# Patient Record
Sex: MALE | Race: BLACK OR AFRICAN AMERICAN | Employment: OTHER | URBAN - METROPOLITAN AREA
[De-identification: names, ages, dates, MRNs, and addresses within clinical notes are randomized per-mention and may not be internally consistent; named-entity substitution may affect disease eponyms.]

---

## 2017-01-04 ENCOUNTER — OFFICE VISIT (OUTPATIENT)
Dept: SURGERY | Age: 72
End: 2017-01-04

## 2017-01-04 VITALS
WEIGHT: 200 LBS | HEIGHT: 67 IN | SYSTOLIC BLOOD PRESSURE: 130 MMHG | BODY MASS INDEX: 31.39 KG/M2 | DIASTOLIC BLOOD PRESSURE: 68 MMHG | HEART RATE: 80 BPM

## 2017-01-04 LAB
GLUCOSE BLD-MCNC: 131 MG/DL
HBA1C MFR BLD: 9.8 %

## 2017-01-04 PROCEDURE — 83036 HEMOGLOBIN GLYCOSYLATED A1C: CPT | Performed by: INTERNAL MEDICINE

## 2017-01-04 PROCEDURE — 99203 OFFICE O/P NEW LOW 30 MIN: CPT | Performed by: INTERNAL MEDICINE

## 2017-01-04 PROCEDURE — 82962 GLUCOSE BLOOD TEST: CPT | Performed by: INTERNAL MEDICINE

## 2017-01-08 ASSESSMENT — ENCOUNTER SYMPTOMS
VISUAL CHANGE: 0
GASTROINTESTINAL NEGATIVE: 1
EYES NEGATIVE: 1
RESPIRATORY NEGATIVE: 1

## 2017-01-30 RX ORDER — ISOPROPYL ALCOHOL 0.75 G/1
SWAB TOPICAL
Refills: 0 | COMMUNITY
Start: 2017-01-13 | End: 2017-02-22

## 2017-01-30 RX ORDER — AMLODIPINE BESYLATE AND BENAZEPRIL HYDROCHLORIDE 5; 20 MG/1; MG/1
CAPSULE ORAL
Qty: 30 CAPSULE | Refills: 5 | Status: SHIPPED | OUTPATIENT
Start: 2017-01-30 | End: 2017-10-05 | Stop reason: SDUPTHER

## 2017-02-22 RX ORDER — ISOPROPYL ALCOHOL 0.75 G/1
SWAB TOPICAL
Qty: 100 EACH | Refills: 1 | Status: SHIPPED | OUTPATIENT
Start: 2017-02-22 | End: 2017-07-20 | Stop reason: SDUPTHER

## 2017-03-16 ENCOUNTER — OFFICE VISIT (OUTPATIENT)
Dept: INTERNAL MEDICINE | Age: 72
End: 2017-03-16

## 2017-03-16 VITALS
BODY MASS INDEX: 31.39 KG/M2 | DIASTOLIC BLOOD PRESSURE: 70 MMHG | SYSTOLIC BLOOD PRESSURE: 132 MMHG | OXYGEN SATURATION: 94 % | WEIGHT: 200 LBS | HEIGHT: 67 IN | HEART RATE: 96 BPM

## 2017-03-16 DIAGNOSIS — I10 ESSENTIAL HYPERTENSION: Primary | Chronic | ICD-10-CM

## 2017-03-16 DIAGNOSIS — D64.9 NORMOCYTIC ANEMIA: ICD-10-CM

## 2017-03-16 LAB
ANION GAP SERPL CALCULATED.3IONS-SCNC: 13 MEQ/L (ref 7–13)
BUN BLDV-MCNC: 13 MG/DL (ref 8–23)
CALCIUM SERPL-MCNC: 10.5 MG/DL (ref 8.6–10.2)
CHLORIDE BLD-SCNC: 104 MEQ/L (ref 98–107)
CO2: 25 MEQ/L (ref 22–29)
CREAT SERPL-MCNC: 1.03 MG/DL (ref 0.7–1.2)
CREATININE URINE: 145.2 MG/DL
GFR AFRICAN AMERICAN: >60
GFR NON-AFRICAN AMERICAN: >60
GLUCOSE BLD-MCNC: 73 MG/DL (ref 74–109)
HBA1C MFR BLD: 8.3 % (ref 4.8–5.9)
MICROALBUMIN UR-MCNC: <1.2 MG/DL
MICROALBUMIN/CREAT UR-RTO: NORMAL MG/G (ref 0–30)
POTASSIUM SERPL-SCNC: 4.4 MEQ/L (ref 3.5–5.1)
SODIUM BLD-SCNC: 142 MEQ/L (ref 132–144)

## 2017-03-16 PROCEDURE — 99213 OFFICE O/P EST LOW 20 MIN: CPT | Performed by: INTERNAL MEDICINE

## 2017-03-16 ASSESSMENT — ENCOUNTER SYMPTOMS
BLOOD IN STOOL: 0
ABDOMINAL PAIN: 0
CHEST TIGHTNESS: 0
GASTROINTESTINAL NEGATIVE: 1
SHORTNESS OF BREATH: 0
RESPIRATORY NEGATIVE: 1
TROUBLE SWALLOWING: 0
ORTHOPNEA: 0

## 2017-03-27 RX ORDER — ASPIRIN 81 MG
TABLET, DELAYED RELEASE (ENTERIC COATED) ORAL
Qty: 30 TABLET | Refills: 5 | Status: SHIPPED | OUTPATIENT
Start: 2017-03-27 | End: 2017-09-22 | Stop reason: SDUPTHER

## 2017-03-29 RX ORDER — ATORVASTATIN CALCIUM 20 MG/1
TABLET, FILM COATED ORAL
Qty: 30 TABLET | Refills: 3 | Status: SHIPPED | OUTPATIENT
Start: 2017-03-29 | End: 2017-07-28 | Stop reason: SDUPTHER

## 2017-04-04 ENCOUNTER — OFFICE VISIT (OUTPATIENT)
Dept: SURGERY | Age: 72
End: 2017-04-04

## 2017-04-04 VITALS
HEART RATE: 73 BPM | SYSTOLIC BLOOD PRESSURE: 126 MMHG | DIASTOLIC BLOOD PRESSURE: 74 MMHG | BODY MASS INDEX: 31.39 KG/M2 | HEIGHT: 67 IN | WEIGHT: 200 LBS

## 2017-04-04 LAB — GLUCOSE BLD-MCNC: 75 MG/DL

## 2017-04-04 PROCEDURE — 82962 GLUCOSE BLOOD TEST: CPT | Performed by: INTERNAL MEDICINE

## 2017-04-04 PROCEDURE — 99213 OFFICE O/P EST LOW 20 MIN: CPT | Performed by: INTERNAL MEDICINE

## 2017-06-02 DIAGNOSIS — C61 ADENOCARCINOMA OF PROSTATE, STAGE 2 (HCC): ICD-10-CM

## 2017-06-02 LAB
ANION GAP SERPL CALCULATED.3IONS-SCNC: 16 MEQ/L (ref 7–13)
BUN BLDV-MCNC: 12 MG/DL (ref 8–23)
CALCIUM SERPL-MCNC: 9.3 MG/DL (ref 8.6–10.2)
CHLORIDE BLD-SCNC: 104 MEQ/L (ref 98–107)
CO2: 21 MEQ/L (ref 22–29)
CREAT SERPL-MCNC: 0.84 MG/DL (ref 0.7–1.2)
GFR AFRICAN AMERICAN: >60
GFR NON-AFRICAN AMERICAN: >60
GLUCOSE BLD-MCNC: 151 MG/DL (ref 74–109)
HBA1C MFR BLD: 7.9 % (ref 4.8–5.9)
POTASSIUM SERPL-SCNC: 3.9 MEQ/L (ref 3.5–5.1)
PROSTATE SPECIFIC ANTIGEN: 0.14 NG/ML (ref 0–6.22)
SODIUM BLD-SCNC: 141 MEQ/L (ref 132–144)

## 2017-06-21 RX ORDER — GLIMEPIRIDE 4 MG/1
TABLET ORAL
Qty: 60 TABLET | Refills: 5 | Status: SHIPPED | OUTPATIENT
Start: 2017-06-21 | End: 2017-12-18 | Stop reason: SDUPTHER

## 2017-06-22 RX ORDER — AVOBENZONE, HOMOSALATE, OCTISALATE, OCTOCRYLENE 30; 100; 50; 25 MG/ML; MG/ML; MG/ML; MG/ML
SPRAY TOPICAL
Qty: 30 TABLET | Refills: 5 | Status: SHIPPED | OUTPATIENT
Start: 2017-06-22 | End: 2017-12-15 | Stop reason: SDUPTHER

## 2017-07-13 ENCOUNTER — OFFICE VISIT (OUTPATIENT)
Dept: SURGERY | Age: 72
End: 2017-07-13

## 2017-07-13 ENCOUNTER — OFFICE VISIT (OUTPATIENT)
Dept: INTERNAL MEDICINE | Age: 72
End: 2017-07-13

## 2017-07-13 VITALS
BODY MASS INDEX: 31.23 KG/M2 | HEART RATE: 67 BPM | HEIGHT: 67 IN | SYSTOLIC BLOOD PRESSURE: 139 MMHG | WEIGHT: 199 LBS | DIASTOLIC BLOOD PRESSURE: 78 MMHG

## 2017-07-13 VITALS
BODY MASS INDEX: 31.23 KG/M2 | DIASTOLIC BLOOD PRESSURE: 78 MMHG | SYSTOLIC BLOOD PRESSURE: 142 MMHG | HEIGHT: 67 IN | WEIGHT: 199 LBS | OXYGEN SATURATION: 98 % | HEART RATE: 67 BPM | TEMPERATURE: 97.2 F

## 2017-07-13 DIAGNOSIS — E78.5 HYPERLIPIDEMIA WITH TARGET LDL LESS THAN 100: ICD-10-CM

## 2017-07-13 DIAGNOSIS — I10 ESSENTIAL HYPERTENSION, BENIGN: Primary | Chronic | ICD-10-CM

## 2017-07-13 LAB — GLUCOSE BLD-MCNC: 106 MG/DL

## 2017-07-13 PROCEDURE — 99213 OFFICE O/P EST LOW 20 MIN: CPT | Performed by: INTERNAL MEDICINE

## 2017-07-13 PROCEDURE — 82962 GLUCOSE BLOOD TEST: CPT | Performed by: INTERNAL MEDICINE

## 2017-07-13 ASSESSMENT — ENCOUNTER SYMPTOMS
BLOOD IN STOOL: 0
CHEST TIGHTNESS: 0
TROUBLE SWALLOWING: 0
ABDOMINAL PAIN: 0
GASTROINTESTINAL NEGATIVE: 1
SHORTNESS OF BREATH: 0
ORTHOPNEA: 0
RESPIRATORY NEGATIVE: 1

## 2017-07-20 RX ORDER — CLONIDINE HYDROCHLORIDE 0.1 MG/1
TABLET ORAL
Qty: 60 TABLET | Refills: 3 | Status: SHIPPED | OUTPATIENT
Start: 2017-07-20 | End: 2017-11-21 | Stop reason: SDUPTHER

## 2017-07-20 RX ORDER — PEN NEEDLE, DIABETIC 31 GX5/16"
NEEDLE, DISPOSABLE MISCELLANEOUS
Qty: 100 EACH | Refills: 3 | Status: SHIPPED | OUTPATIENT
Start: 2017-07-20 | End: 2017-10-05 | Stop reason: SDUPTHER

## 2017-07-20 RX ORDER — ISOPROPYL ALCOHOL 0.75 G/1
SWAB TOPICAL
Qty: 100 EACH | Refills: 1 | Status: SHIPPED | OUTPATIENT
Start: 2017-07-20 | End: 2018-04-15 | Stop reason: SDUPTHER

## 2017-07-24 RX ORDER — SITAGLIPTIN 100 MG/1
TABLET, FILM COATED ORAL
Qty: 30 TABLET | Refills: 5 | Status: SHIPPED | OUTPATIENT
Start: 2017-07-24 | End: 2017-10-05 | Stop reason: SDUPTHER

## 2017-07-31 RX ORDER — ATORVASTATIN CALCIUM 20 MG/1
TABLET, FILM COATED ORAL
Qty: 30 TABLET | Refills: 3 | Status: SHIPPED | OUTPATIENT
Start: 2017-07-31 | End: 2017-10-05 | Stop reason: SDUPTHER

## 2017-08-07 RX ORDER — LANCETS
EACH MISCELLANEOUS
Qty: 100 EACH | Refills: 3 | Status: SHIPPED | OUTPATIENT
Start: 2017-08-07 | End: 2021-08-26

## 2017-08-10 ENCOUNTER — OFFICE VISIT (OUTPATIENT)
Dept: UROLOGY | Age: 72
End: 2017-08-10

## 2017-08-10 VITALS
HEART RATE: 78 BPM | DIASTOLIC BLOOD PRESSURE: 76 MMHG | WEIGHT: 198.5 LBS | HEIGHT: 69 IN | BODY MASS INDEX: 29.4 KG/M2 | SYSTOLIC BLOOD PRESSURE: 140 MMHG

## 2017-08-10 DIAGNOSIS — C61 ADENOCARCINOMA OF PROSTATE, STAGE 2 (HCC): Primary | ICD-10-CM

## 2017-08-10 PROCEDURE — 99214 OFFICE O/P EST MOD 30 MIN: CPT | Performed by: UROLOGY

## 2017-08-10 RX ORDER — SILDENAFIL CITRATE 20 MG/1
TABLET ORAL
Qty: 30 TABLET | Refills: 11 | Status: SHIPPED | OUTPATIENT
Start: 2017-08-10 | End: 2019-02-12

## 2017-09-22 RX ORDER — ASPIRIN 81 MG
TABLET, DELAYED RELEASE (ENTERIC COATED) ORAL
Qty: 30 TABLET | Refills: 5 | Status: SHIPPED | OUTPATIENT
Start: 2017-09-22 | End: 2018-01-11 | Stop reason: SDUPTHER

## 2017-10-05 ENCOUNTER — OFFICE VISIT (OUTPATIENT)
Dept: INTERNAL MEDICINE | Age: 72
End: 2017-10-05

## 2017-10-05 ENCOUNTER — OFFICE VISIT (OUTPATIENT)
Dept: SURGERY | Age: 72
End: 2017-10-05

## 2017-10-05 VITALS
HEIGHT: 67 IN | BODY MASS INDEX: 31.39 KG/M2 | HEART RATE: 71 BPM | SYSTOLIC BLOOD PRESSURE: 128 MMHG | OXYGEN SATURATION: 98 % | TEMPERATURE: 97.8 F | WEIGHT: 200 LBS | DIASTOLIC BLOOD PRESSURE: 80 MMHG

## 2017-10-05 VITALS
SYSTOLIC BLOOD PRESSURE: 154 MMHG | DIASTOLIC BLOOD PRESSURE: 88 MMHG | HEIGHT: 67 IN | WEIGHT: 200 LBS | BODY MASS INDEX: 31.39 KG/M2 | HEART RATE: 70 BPM

## 2017-10-05 DIAGNOSIS — I10 ESSENTIAL HYPERTENSION: Primary | ICD-10-CM

## 2017-10-05 DIAGNOSIS — E78.5 HYPERLIPIDEMIA WITH TARGET LDL LESS THAN 100: ICD-10-CM

## 2017-10-05 DIAGNOSIS — I10 ESSENTIAL HYPERTENSION, BENIGN: Chronic | ICD-10-CM

## 2017-10-05 LAB
ANION GAP SERPL CALCULATED.3IONS-SCNC: 14 MEQ/L (ref 7–13)
BUN BLDV-MCNC: 9 MG/DL (ref 8–23)
CALCIUM SERPL-MCNC: 9.3 MG/DL (ref 8.6–10.2)
CHLORIDE BLD-SCNC: 104 MEQ/L (ref 98–107)
CHOLESTEROL, TOTAL: 151 MG/DL (ref 0–199)
CO2: 24 MEQ/L (ref 22–29)
CREAT SERPL-MCNC: 0.91 MG/DL (ref 0.7–1.2)
GFR AFRICAN AMERICAN: >60
GFR NON-AFRICAN AMERICAN: >60
GLUCOSE BLD-MCNC: 127 MG/DL (ref 74–109)
GLUCOSE BLD-MCNC: 132 MG/DL
HBA1C MFR BLD: 7.6 % (ref 4.8–5.9)
HDLC SERPL-MCNC: 29 MG/DL (ref 40–59)
LDL CHOLESTEROL CALCULATED: 104 MG/DL (ref 0–129)
POTASSIUM SERPL-SCNC: 4 MEQ/L (ref 3.5–5.1)
SODIUM BLD-SCNC: 142 MEQ/L (ref 132–144)
TRIGL SERPL-MCNC: 89 MG/DL (ref 0–200)

## 2017-10-05 PROCEDURE — 99213 OFFICE O/P EST LOW 20 MIN: CPT | Performed by: INTERNAL MEDICINE

## 2017-10-05 PROCEDURE — 82962 GLUCOSE BLOOD TEST: CPT | Performed by: INTERNAL MEDICINE

## 2017-10-05 RX ORDER — AMLODIPINE BESYLATE AND BENAZEPRIL HYDROCHLORIDE 5; 20 MG/1; MG/1
CAPSULE ORAL
Qty: 30 CAPSULE | Refills: 5 | Status: SHIPPED | OUTPATIENT
Start: 2017-10-05 | End: 2018-04-11 | Stop reason: SDUPTHER

## 2017-10-05 RX ORDER — ATORVASTATIN CALCIUM 20 MG/1
TABLET, FILM COATED ORAL
Qty: 30 TABLET | Refills: 5 | Status: SHIPPED | OUTPATIENT
Start: 2017-10-05 | End: 2018-04-11 | Stop reason: SDUPTHER

## 2017-10-05 ASSESSMENT — ENCOUNTER SYMPTOMS
TROUBLE SWALLOWING: 0
GASTROINTESTINAL NEGATIVE: 1
RESPIRATORY NEGATIVE: 1
BLOOD IN STOOL: 0
ABDOMINAL PAIN: 0
ORTHOPNEA: 0
SHORTNESS OF BREATH: 0
CHEST TIGHTNESS: 0

## 2017-10-05 ASSESSMENT — PATIENT HEALTH QUESTIONNAIRE - PHQ9
SUM OF ALL RESPONSES TO PHQ9 QUESTIONS 1 & 2: 0
2. FEELING DOWN, DEPRESSED OR HOPELESS: 0
SUM OF ALL RESPONSES TO PHQ QUESTIONS 1-9: 0
1. LITTLE INTEREST OR PLEASURE IN DOING THINGS: 0

## 2017-10-05 NOTE — PROGRESS NOTES
Compliance problems include adherence to diet and adherence to exercise. Risk factors for coronary artery disease include hypertension, male sex, obesity, diabetes mellitus and dyslipidemia. More detail above in the chief complaint(s) and below in the review of systems. Past Medical History:   Diagnosis Date    Adenocarcinoma of prostate, stage 2 ([facility] 2015    [place] 3+4, Dr Shawna Ayala  PSA 7.69 intermediate risk    BPH with elevated PSA 2014    Dr Tien Rainey Erectile dysfunction associated with type 2 diabetes mellitus ([facility]     Essential hypertension, benign     Low HDL (under 40)     Obesity (BMI 30-39. 9)     Pes planus of both feet 2015    Dr Sharla Collins S/P colonoscopy with polypectomy 2014    Dr Wesly Reyez Uncontrolled type 2 diabetes mellitus with microalbuminuria [facility]) 2007    Dr Barbara Oleary Unspecified deformity of ankle and foot, acquired     Vitamin B12 deficiency anemia 2016       Past Surgical History:   Procedure Laterality Date    COLONOSCOPY  03/20/2014    Polyp, Diverticulosis, Internal Hemorrhoids-Dr Sanders    PROSTATE BIOPSY  1/2015    Dr Shawna Ayala       Social History     Social History    Marital status: Single     Spouse name: N/A    Number of children: 2    Years of education: N/A     Occupational History    Fast food, others, SSI      Social History Main Topics    Smoking status: Former Smoker     Start date: 10/28/2004    Smokeless tobacco: Never Used    Alcohol use Yes      Comment: occasionally, beer- recovering alcoholic    Drug use: No    Sexual activity: Yes     Partners: Female     Other Topics Concern    Not on file     Social History Narrative    Born in [place], one of 6    Lives in a house in [place] with girlfriend    2 sons in [place]    Worked \"everywhere\", on disability         Hobbies: video games       Family History   Problem Relation Age of Onset    Stroke Mother      dec age [de-identified]    Cancer Brother      liver, dec age 79    Diabetes Father    

## 2017-10-05 NOTE — MR AVS SNAPSHOT
After Visit Summary             Efren Oseguera   10/5/2017 4:00 PM   Office Visit    Description:  Male : 1945   Provider:  Sergio Hess MD   Department:  [address] [facility] and Future Appointments         Below is a list of your follow-up and future appointments. This may not be a complete list as you may have made appointments directly with providers that we are not aware of or your providers may have made some for you. Please call your providers to confirm appointments. It is important to keep your appointments. Please bring your current insurance card, photo ID, co-pay, and all medication bottles to your appointment. If self-pay, payment is expected at the time of service. Your To-Do List     Future Appointments Provider Department Dept Phone    2018 3:15 PM Sergio Hess MD [facility] [phone]    Please arrive 15 minutes prior to appointment, bring photo ID and insurance card. 2018 4:00 PM Edil Dent MD [address] [facility] [phone]    Please arrive 15 minutes prior to appointment, bring photo ID and insurance card. Please arrive 15 minutes prior to your appointment. 2018 2:15 PM Lisa Ovalles MD [facility] EMERGENCY [facility] [phone]    Please arrive 15 minutes prior to appointment, bring photo ID and insurance card. Follow-Up    Return in about 5 months (around 3/5/2018).          Information from Your Visit        Department     Name Address Phone Fax    [address] [phone]      You Were Seen for:         Comments    Essential hypertension   [[id number]]         Vital Signs     Blood Pressure Pulse Temperature Height Weight Oxygen Saturation    128/80 71 97.8 °F (36.6 °C) (Tympanic) 5' 7\" (1.702 m) 200 lb (90.7 kg) 98%    Body Mass Index Smoking Status                31.32 kg/m2 Former Smoker sildenafil (REVATIO) 20 MG tablet Take one tablet to five tablets (maximum of five in one day) AS NEEDED on an empty stomach two hours before planned sexual activity. ONE TOUCH ULTRA TEST strip TEST once daily    ONE TOUCH ULTRASOFT LANCETS MISC TEST once daily    atorvastatin (LIPITOR) 20 MG tablet take 1 tablet by mouth once daily    cloNIDine (CATAPRES) 0.1 MG tablet take 1 tablet by mouth twice a day    Alcohol Swabs (B-D SINGLE USE SWABS REGULAR) PADS use as directed once daily    RA VITAMIN D-3 1000 units TABS tablet take 1 tablet by mouth once daily    glimepiride (AMARYL) 4 MG tablet take 1 tablet twice a day with meals    amLODIPine-benazepril (LOTREL) 5-20 MG per capsule take 1 capsule by mouth once daily    RA VITAMIN B-12 TR 1000 MCG TBCR take 1 tablet by mouth once daily    ibuprofen (ADVIL;MOTRIN) 600 MG tablet Take 1 tablet by mouth 2 times daily as needed for Pain    tadalafil (CIALIS) 20 MG tablet Take 1 tablet by mouth as needed for Erectile Dysfunction. insulin glargine (LANTUS SOLOSTAR) 100 UNIT/ML injection pen Inject 20 Units into the skin nightly    metFORMIN (GLUCOPHAGE) 1000 MG tablet Take 1 tablet by mouth 2 times daily (with meals)    Insulin Pen Needle (B-D UF III MINI PEN NEEDLES) 31G X 5 MM MISC USE AS DIRECTED DAILY    SITagliptin (JANUVIA) 100 MG tablet take 1 tablet by mouth once daily      Allergies           No Known Allergies         Additional Information        Basic Information     Date Of Birth Sex Race Ethnicity Preferred Language    1945 Male Black Non-/Non  English      Problem List as of 10/5/2017  Date Reviewed: 7/13/2017                Anemia due to vitamin B12 deficiency    Hypercalcemia    Knee pain    Adenocarcinoma of prostate, stage 2 (HCC)    Erectile dysfunction associated with type 2 diabetes mellitus (HCC)    Normocytic anemia    Pes planus    Essential hypertension, benign    Obesity (BMI 30-39. 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 (HCC)       Weight    Weight < 200 lb (90.719 kg)           Weight < 200 lb (90.719 kg)           Related Problems    Type II or unspecified type diabetes mellitus without mention of complication, not stated as uncontrolled    Weight < 200 lb (90.719 kg)           Related Problems    Type II or unspecified type diabetes mellitus without mention of complication, not stated as uncontrolled    Weight < 200 lb (90.719 kg)           Related Problems    Type II or unspecified type diabetes mellitus without mention of complication, not stated as uncontrolled      Preventive Care        Date Due    Hepatitis C screening is recommended for all adults regardless of risk factors born between [facility] at least once (lifetime) who have never been tested. 1945    Tetanus Combination Vaccine (1 - Tdap) 7/13/1965    Pneumococcal Vaccines (two) for all adults aged 72 and over (1 of 2 - PCV13) 7/13/2011    Eye Exam By An Eye Doctor 8/10/2016    Diabetic Foot Exam 8/5/2017    Yearly Flu Vaccine (1) 9/1/2017    Hemoglobin A1C (Test For Long-Term Glucose Control) 10/5/2018    Cholesterol Screening 10/5/2018    Colonoscopy 3/20/2019            Ecoviatehart Signup           AdEspresso allows you to send messages to your doctor, view your test results, renew your prescriptions, schedule appointments, view visit notes, and more. How Do I Sign Up? 1. In your Internet browser, go to [email or web address]  2. Click on the Sign Up Now link in the Sign In box. You will see the New Member Sign Up page. 3. Enter your AdEspresso Access Code exactly as it appears below. You will not need to use this code after youve completed the sign-up process. If you do not sign up before the expiration date, you must request a new code.   AdEspresso Access Code: [email or web address]  Expires: 12/4/2017  4:23 PM 4. Enter your Social Security Number ([id number]) and Date of Birth (mm/dd/yyyy) as indicated and click Submit. You will be taken to the next sign-up page. 5. Create a GeeYee ID. This will be your GeeYee login ID and cannot be changed, so think of one that is secure and easy to remember. 6. Create a GeeYee password. You can change your password at any time. 7. Enter your Password Reset Question and Answer. This can be used at a later time if you forget your password. 8. Enter your e-mail address. You will receive e-mail notification when new information is available in [address] 9. Click Sign Up. You can now view your medical record. Additional Information  If you have questions, please contact the physician practice where you receive care. Remember, GeeYee is NOT to be used for urgent needs. For medical emergencies, dial 911. For questions regarding your GeeYee account call [phone]. If you have a clinical question, please call your doctor's office.

## 2017-10-05 NOTE — PROGRESS NOTES
Subjective:      Patient ID: Zane Gillette is a 71 y.o. male. Diabetes   He presents for his follow-up diabetic visit. He has type 2 diabetes mellitus. His disease course has been stable. Hypoglycemia symptoms include dizziness and nervousness/anxiousness. Symptoms are stable. Diabetic complications include impotence. Risk factors for coronary artery disease include diabetes mellitus, male sex, sedentary lifestyle and obesity. Current diabetic treatment includes insulin injections and oral agent (triple therapy) Bethany Messenger METFORMIN  amaryl and lantus). He is currently taking insulin at bedtime. Insulin injections are given by patient. His weight is fluctuating minimally. Meal planning includes avoidance of concentrated sweets. He participates in exercise intermittently. His home blood glucose trend is fluctuating minimally. His overall blood glucose range is 130-140 mg/dl. (Lab Results       Component                Value               Date                       LABA1C                   7.6 (H)             10/05/2017            ) An ACE inhibitor/angiotensin II receptor blocker is being taken. Patient Active Problem List   Diagnosis    Essential hypertension, benign    Obesity (BMI 30-39. 9)    Hyperlipidemia with target LDL less than 100    Low HDL (under 40)    Pes planus    Normocytic anemia    Erectile dysfunction associated with type 2 diabetes mellitus (HCC)    Adenocarcinoma of prostate, stage 2 (HCC)    Knee pain    Anemia due to vitamin B12 deficiency    Hypercalcemia    Uncontrolled type 2 diabetes mellitus with microalbuminuria (HCC)     No Known Allergies      Current Outpatient Prescriptions:     ASPIRIN LOW DOSE 81 MG EC tablet, take 1 tablet by mouth once daily, Disp: 30 tablet, Rfl: 5    sildenafil (REVATIO) 20 MG tablet, Take one tablet to five tablets (maximum of five in one day) AS NEEDED on an empty stomach two hours before planned sexual activity. , Disp: 30 tablet, Rfl: 11  

## 2017-10-23 ENCOUNTER — HOSPITAL ENCOUNTER (OUTPATIENT)
Dept: RADIATION ONCOLOGY | Age: 72
Discharge: HOME | End: 2017-10-23
Payer: COMMERCIAL

## 2017-10-23 VITALS
RESPIRATION RATE: 14 BRPM | HEART RATE: 67 BPM | TEMPERATURE: 98.2 F | OXYGEN SATURATION: 98 % | SYSTOLIC BLOOD PRESSURE: 142 MMHG | DIASTOLIC BLOOD PRESSURE: 94 MMHG | BODY MASS INDEX: 31.51 KG/M2 | WEIGHT: 201.2 LBS

## 2017-10-23 DIAGNOSIS — C61 ADENOCARCINOMA OF PROSTATE, STAGE 2 (HCC): Primary | ICD-10-CM

## 2017-10-23 PROCEDURE — 99212 OFFICE O/P EST SF 10 MIN: CPT | Performed by: RADIOLOGY

## 2017-10-23 NOTE — ONCOLOGY
RADIATION ONCOLOGY FOLLOW-UP    DATE OF VISIT: 10/23/2017    Patient Active Problem List   Diagnosis Code    Adenocarcinoma of prostate, stage 2 ([facility] C61     DIAGNOSIS: Intermediate risk prostate cancer, PSA 7.69, Jim 4+3=7     PRIOR TREATMENT: RT 79.2Gy std fractionation, completed 7/17/15     TIME SINCE TREATMENT: 15m     INTERVAL HISTORY: Last 6m lupron 2/16. PSA 0.02 then, <0.01 in 8/16, now 0.14 (rise expected). He sees Dr. Gurwinder Edgar At 6 months intervals with PSA. No dysuria, noct x 1, IPSS = 3. No hematuria, no diarrhea or other rectal symptoms. No skeletal pain. The patient does note erectile dysfunction. He had failed to obtain a few free samples of Cialis or Viagra, which we do not stock in this department. PAST MEDICAL HISTORY:   Past Medical History:   Diagnosis Date    Adenocarcinoma of prostate, stage 2 ([facility] 2015    [place] 3+4, Dr Gurwinder Edgar  PSA 7.69 intermediate risk    BPH with elevated PSA 2014    Dr Rodo Molina Erectile dysfunction associated with type 2 diabetes mellitus ([facility]     Essential hypertension, benign     Low HDL (under 40)     Obesity (BMI 30-39. 9)     Pes planus of both feet 2015    Dr Althea Baugh S/P colonoscopy with polypectomy 2014    Dr Eveline Patel Uncontrolled type 2 diabetes mellitus with microalbuminuria [facility]) 2007    Dr Marely Mckeon Unspecified deformity of ankle and foot, acquired     Vitamin B12 deficiency anemia 2016       PAST SURGICAL HISTORY:  Past Surgical History:   Procedure Laterality Date    COLONOSCOPY  03/20/2014    Polyp, Diverticulosis, Internal Hemorrhoids-Dr Sanders    PROSTATE BIOPSY  1/2015    Dr Gurwinder Edgar        History   Smoking Status    Former Smoker    Start date: 10/28/2004   Smokeless Tobacco    Never Used     History   Alcohol Use    Yes     Comment: occasionally, beer- recovering alcoholic       ALLERGIES:   No Known Allergies     CURRENT MEDICATIONS:     Prior to Admission medications    Medication Sig Start Date End Date Taking? Authorizing Provider   amLODIPine-benazepril (LOTREL) 5-20 MG per capsule take 1 capsule by mouth once daily 10/5/17   Mihir Julian MD   atorvastatin (LIPITOR) 20 MG tablet take 1 tablet by mouth once daily 10/5/17   Mihir Julian MD   insulin glargine (LANTUS SOLOSTAR) 100 UNIT/ML injection pen Inject 20 Units into the skin nightly 10/5/17   Cristy Sam MD   metFORMIN (GLUCOPHAGE) 1000 MG tablet Take 1 tablet by mouth 2 times daily (with meals) 10/5/17   Cristy Sam MD   Insulin Pen Needle (B-D UF III MINI PEN NEEDLES) 31G X 5 MM MISC USE AS DIRECTED DAILY 10/5/17   Cristy Sam MD   SITagliptin (JANUVIA) 100 MG tablet take 1 tablet by mouth once daily 10/5/17   Cristy Sam MD   ASPIRIN LOW DOSE 81 MG EC tablet take 1 tablet by mouth once daily 9/22/17   Mihir Julian MD   sildenafil (REVATIO) 20 MG tablet Take one tablet to five tablets (maximum of five in one day) AS NEEDED on an empty stomach two hours before planned sexual activity. 8/10/17   Cyril Whitaker MD   ONE TOUCH ULTRA TEST strip TEST once daily 8/7/17   Mihir Julian MD   ONE TOUCH ULTRASOFT LANCETS MISC TEST once daily 8/7/17   Mihir Julian MD   cloNIDine (CATAPRES) 0.1 MG tablet take 1 tablet by mouth twice a day 7/20/17   Mihir Julian MD   Alcohol Swabs (B-D SINGLE USE SWABS REGULAR) PADS use as directed once daily 7/20/17   Mihir Julian MD   RA VITAMIN D-3 1000 units TABS tablet take 1 tablet by mouth once daily 6/22/17   Mihir Julian MD   glimepiride (AMARYL) 4 MG tablet take 1 tablet twice a day with meals 6/21/17   Mihir Julian MD RA VITAMIN B-12 TR 1000 MCG TBCR take 1 tablet by mouth once daily 8/26/16   Mihir Julian MD   ibuprofen (ADVIL;MOTRIN) 600 MG tablet Take 1 tablet by mouth 2 times daily as needed for Pain 2/4/16   Mihir Julian MD   tadalafil (CIALIS) 20 MG tablet Take 1 tablet by mouth as needed for Erectile Dysfunction.  1/29/15   Mihir Julian MD     ECOG PERFORMANCE STATUS: 0    VITAL SIGNS:    Vitals:    10/23/17 1416   BP: (!) 142/94   Pulse: 67   Resp: 14   Temp: 98.2 °F (36.8 °C)   SpO2: 98%   Weight: 201 lb 3.2 oz (91.3 kg)     PHYSICAL EXAMINATION:  GENERAL: No acute distress. awake, alert, cooperative  HEENT:  PERRLA, EOMI. Oral cavity WNL. NECK:  No cervical or supraclavicular adenopathy. CHEST/LUNGS: CTA, no rib or spine tenderness. CARDIOVASCULAR:  RRR, no audible murmur  ABDOMEN:  Nontender, nondistended, normal bowel sounds  EXTREMITIES: No C/C/E   RECTAL: deferred. Low PSA, no symptoms. STUDIES: PSA 0.14, June 2017. IMPRESSION/PLAN:    Clinically TERRA, with no significant sequelae secondary to prior radiotherapy. The patient is following closely with Dr. Irene Beard, and we will see him back again as needed. Electronically signed by Yolanda Rose MD on 10/23/17 at 2:33 PM      Thank you for allowing us to participate in the care of this patient.   cc:   No att. providers found  MD Cathryn Parr MD  [address]

## 2017-11-22 RX ORDER — CLONIDINE HYDROCHLORIDE 0.1 MG/1
TABLET ORAL
Qty: 60 TABLET | Refills: 3 | Status: SHIPPED | OUTPATIENT
Start: 2017-11-22 | End: 2018-01-11 | Stop reason: SDUPTHER

## 2017-11-22 RX ORDER — IBUPROFEN 600 MG/1
TABLET ORAL
Qty: 60 TABLET | Refills: 3 | Status: SHIPPED | OUTPATIENT
Start: 2017-11-22 | End: 2018-12-10

## 2017-12-18 RX ORDER — GLIMEPIRIDE 4 MG/1
TABLET ORAL
Qty: 60 TABLET | Refills: 5 | Status: SHIPPED | OUTPATIENT
Start: 2017-12-18 | End: 2018-01-11 | Stop reason: ALTCHOICE

## 2017-12-18 RX ORDER — AVOBENZONE, HOMOSALATE, OCTISALATE, OCTOCRYLENE 30; 100; 50; 25 MG/ML; MG/ML; MG/ML; MG/ML
SPRAY TOPICAL
Qty: 30 TABLET | Refills: 5 | Status: SHIPPED | OUTPATIENT
Start: 2017-12-18 | End: 2018-05-18 | Stop reason: SDUPTHER

## 2018-01-11 ENCOUNTER — OFFICE VISIT (OUTPATIENT)
Dept: SURGERY | Age: 73
End: 2018-01-11

## 2018-01-11 ENCOUNTER — OFFICE VISIT (OUTPATIENT)
Dept: INTERNAL MEDICINE | Age: 73
End: 2018-01-11

## 2018-01-11 VITALS
HEART RATE: 84 BPM | OXYGEN SATURATION: 98 % | TEMPERATURE: 95.4 F | HEIGHT: 67 IN | SYSTOLIC BLOOD PRESSURE: 128 MMHG | DIASTOLIC BLOOD PRESSURE: 80 MMHG | BODY MASS INDEX: 32.18 KG/M2 | WEIGHT: 205 LBS

## 2018-01-11 VITALS
WEIGHT: 205 LBS | DIASTOLIC BLOOD PRESSURE: 80 MMHG | HEIGHT: 67 IN | SYSTOLIC BLOOD PRESSURE: 128 MMHG | BODY MASS INDEX: 32.18 KG/M2 | HEART RATE: 84 BPM

## 2018-01-11 DIAGNOSIS — I10 ESSENTIAL HYPERTENSION: Primary | Chronic | ICD-10-CM

## 2018-01-11 DIAGNOSIS — E78.5 HYPERLIPIDEMIA WITH TARGET LDL LESS THAN 100: Chronic | ICD-10-CM

## 2018-01-11 LAB
GLUCOSE BLD-MCNC: 69 MG/DL
HBA1C MFR BLD: 6.7 %

## 2018-01-11 PROCEDURE — G8484 FLU IMMUNIZE NO ADMIN: HCPCS | Performed by: INTERNAL MEDICINE

## 2018-01-11 PROCEDURE — G8417 CALC BMI ABV UP PARAM F/U: HCPCS | Performed by: INTERNAL MEDICINE

## 2018-01-11 PROCEDURE — 99213 OFFICE O/P EST LOW 20 MIN: CPT | Performed by: INTERNAL MEDICINE

## 2018-01-11 PROCEDURE — 83036 HEMOGLOBIN GLYCOSYLATED A1C: CPT | Performed by: INTERNAL MEDICINE

## 2018-01-11 PROCEDURE — G8427 DOCREV CUR MEDS BY ELIG CLIN: HCPCS | Performed by: INTERNAL MEDICINE

## 2018-01-11 PROCEDURE — 3017F COLORECTAL CA SCREEN DOC REV: CPT | Performed by: INTERNAL MEDICINE

## 2018-01-11 PROCEDURE — 82962 GLUCOSE BLOOD TEST: CPT | Performed by: INTERNAL MEDICINE

## 2018-01-11 PROCEDURE — 1123F ACP DISCUSS/DSCN MKR DOCD: CPT | Performed by: INTERNAL MEDICINE

## 2018-01-11 PROCEDURE — 1036F TOBACCO NON-USER: CPT | Performed by: INTERNAL MEDICINE

## 2018-01-11 PROCEDURE — 3044F HG A1C LEVEL LT 7.0%: CPT | Performed by: INTERNAL MEDICINE

## 2018-01-11 PROCEDURE — 4040F PNEUMOC VAC/ADMIN/RCVD: CPT | Performed by: INTERNAL MEDICINE

## 2018-01-11 RX ORDER — CLONIDINE HYDROCHLORIDE 0.1 MG/1
TABLET ORAL
Qty: 60 TABLET | Refills: 3 | Status: SHIPPED | OUTPATIENT
Start: 2018-01-11 | End: 2018-05-10 | Stop reason: SDUPTHER

## 2018-01-11 RX ORDER — ASPIRIN 81 MG/1
TABLET ORAL
Qty: 30 TABLET | Refills: 5 | Status: SHIPPED | OUTPATIENT
Start: 2018-01-11 | End: 2018-08-05 | Stop reason: SDUPTHER

## 2018-01-11 RX ORDER — GLIMEPIRIDE 2 MG/1
TABLET ORAL
Qty: 30 TABLET | Refills: 3 | Status: SHIPPED | OUTPATIENT
Start: 2018-01-11 | End: 2018-12-10

## 2018-01-11 ASSESSMENT — ENCOUNTER SYMPTOMS
SHORTNESS OF BREATH: 0
ORTHOPNEA: 0
BLOOD IN STOOL: 0
GASTROINTESTINAL NEGATIVE: 1
RESPIRATORY NEGATIVE: 1
CHEST TIGHTNESS: 0
TROUBLE SWALLOWING: 0
ABDOMINAL PAIN: 0

## 2018-01-11 NOTE — PROGRESS NOTES
Subjective:      Patient ID: Gwrichard Favorite is a 71 y.o. male. Diabetes   He presents for his follow-up diabetic visit. He has type 2 diabetes mellitus. His disease course has been improving. Hypoglycemia symptoms include dizziness and nervousness/anxiousness. Diabetic complications include impotence. Risk factors for coronary artery disease include diabetes mellitus, male sex, sedentary lifestyle and obesity. Current diabetic treatment includes insulin injections Jeet Grieve METFORMIN  amaryl and lantus). He is currently taking insulin at bedtime. Insulin injections are given by patient. His weight is fluctuating minimally. He participates in exercise intermittently. His home blood glucose trend is fluctuating minimally. His overall blood glucose range is 130-140 mg/dl. (Lab Results       Component                Value               Date                       LABA1C                   6.7                 01/11/2018            ) An ACE inhibitor/angiotensin II receptor blocker is being taken. Patient Active Problem List   Diagnosis    Essential hypertension, benign    Obesity (BMI 30-39. 9)    Hyperlipidemia with target LDL less than 100    Low HDL (under 40)    Pes planus    Normocytic anemia    Erectile dysfunction associated with type 2 diabetes mellitus (HCC)    Adenocarcinoma of prostate, stage 2 (HCC)    Knee pain    Anemia due to vitamin B12 deficiency    Hypercalcemia    Uncontrolled type 2 diabetes mellitus with microalbuminuria (HCC)     No Known Allergies      Current Outpatient Prescriptions:     cloNIDine (CATAPRES) 0.1 MG tablet, take 1 tablet by mouth twice a day, Disp: 60 tablet, Rfl: 3    aspirin (ASPIRIN LOW DOSE) 81 MG EC tablet, take 1 tablet by mouth once daily, Disp: 30 tablet, Rfl: 5    insulin glargine (LANTUS SOLOSTAR) 100 UNIT/ML injection pen, Inject 20 Units into the skin nightly, Disp: 5 pen, Rfl: 3    metFORMIN (GLUCOPHAGE) 1000 MG tablet, Take 1 tablet by mouth 2 times

## 2018-01-12 NOTE — PROGRESS NOTES
Graham Horn is a 71 y.o. male with history of diabetes mellitus, obesity, prostate cancer, who presents with     Chief Complaint   Patient presents with    Hypertension    Hyperlipidemia     Since the past office visit, the patient has been in stable health. No emergency room or hospital admissions. He has been following with his urologist and radiation specialist and endocrinologist.  The following laboratory reports since the past visit were reviewed (the ones pertinent to today's visit were discussed with the patient):    Office Visit on 01/11/2018   Component Date Value Ref Range Status    Glucose 01/11/2018 69  mg/dL Final    Hemoglobin A1C 01/11/2018 6.7  % Final       HPI:    Hypertension   This is a chronic problem. The current episode started more than 1 year ago. The problem has been waxing and waning since onset. Associated symptoms include anxiety. Pertinent negatives include no chest pain, headaches, neck pain, orthopnea, palpitations, peripheral edema, PND or shortness of breath. Agents associated with hypertension include NSAIDs. Risk factors for coronary artery disease include diabetes mellitus, obesity and sedentary lifestyle. Past treatments include ACE inhibitors, calcium channel blockers and central alpha agonists. The current treatment provides moderate improvement. Compliance problems include diet. There is no history of angina, CAD/MI, CVA, PVD, renovascular disease or a thyroid problem. There is no history of chronic renal disease or sleep apnea. Hyperlipidemia   This is a chronic problem. Recent lipid tests were reviewed and are variable. Exacerbating diseases include diabetes and obesity. He has no history of chronic renal disease or hypothyroidism. Factors aggravating his hyperlipidemia include fatty foods. Pertinent negatives include no chest pain, myalgias or shortness of breath. Current antihyperlipidemic treatment includes statins.  The current treatment provides moderate improvement of lipids. Compliance problems include adherence to diet and adherence to exercise. Risk factors for coronary artery disease include hypertension, male sex, obesity, diabetes mellitus and dyslipidemia. More detail above in the chief complaint(s) and below in the review of systems. Past Medical History:   Diagnosis Date    Adenocarcinoma of prostate, stage 2 ([facility] 2015    [place] 3+4, Dr Catracho Ray  PSA 7.69 intermediate risk    BPH with elevated PSA 2014    Dr Grover May Erectile [facility] associated with type [facility]     Essential hypertension, benign     Low HDL (under 40)     Obesity (BMI 30-39. 9)     Pes planus of both feet 2015    Dr Vianney Claudio S/P colonoscopy with polypectomy 2014    Dr Nassar Expose Uncontrolled type 2 diabetes mellitus with microalbuminuria [facility]) 2007    Dr Gopal Murray Unspecified deformity of ankle and foot, acquired     Vitamin B12 deficiency anemia 2016       Past Surgical History:   Procedure Laterality Date    COLONOSCOPY  03/20/2014    Polyp, Diverticulosis, Internal Hemorrhoids-Dr Sanders    PROSTATE BIOPSY  1/2015    Dr Catracho Ray       Social History     Social History    Marital status: Single     Spouse name: N/A    Number of children: 2    Years of education: N/A     Occupational History    Fast food, others, SSI      Social History Main Topics    Smoking status: Former Smoker     Start date: 10/28/2004    Smokeless tobacco: Never Used    Alcohol use Yes      Comment: occasionally, beer- recovering alcoholic    Drug use: No    Sexual activity: Yes     Partners: Female     Other Topics Concern    Not on file     Social History Narrative    Born in [place], one of 6    Lives in a house in [place] with girlfriend    2 sons in [place]    Worked \"everywhere\", on disability         Hobbies: video games       Family History   Problem Relation Age of Onset    Stroke Mother      dec age [de-identified]    Cancer Brother      liver, dec age 79    Return in about 4 months (around 5/11/2018). I have reviewed the patient's medical and surgical, family and social history, health maintenance schedule, and updated the computerized patient record. Please note this report has been partially produced by using speech recognition hardware. It may contain errors related to the system, including grammar, punctuation and spelling as well as words and phrases that may seem inaccurate. For any questions or concerns, please feel free to contact me for clarification.         Electronically signed by Fred Maradiaga MD

## 2018-02-12 DIAGNOSIS — C61 ADENOCARCINOMA OF PROSTATE, STAGE 2 (HCC): ICD-10-CM

## 2018-02-12 LAB — PROSTATE SPECIFIC ANTIGEN: 0.21 NG/ML (ref 0–6.22)

## 2018-02-16 ENCOUNTER — OFFICE VISIT (OUTPATIENT)
Dept: UROLOGY | Age: 73
End: 2018-02-16
Payer: COMMERCIAL

## 2018-02-16 VITALS
BODY MASS INDEX: 29.61 KG/M2 | HEIGHT: 69 IN | DIASTOLIC BLOOD PRESSURE: 74 MMHG | WEIGHT: 199.9 LBS | HEART RATE: 83 BPM | SYSTOLIC BLOOD PRESSURE: 114 MMHG

## 2018-02-16 DIAGNOSIS — C61 PROSTATE CANCER (HCC): Primary | ICD-10-CM

## 2018-02-16 PROCEDURE — 99213 OFFICE O/P EST LOW 20 MIN: CPT | Performed by: UROLOGY

## 2018-02-16 PROCEDURE — 3017F COLORECTAL CA SCREEN DOC REV: CPT | Performed by: UROLOGY

## 2018-02-16 PROCEDURE — 1123F ACP DISCUSS/DSCN MKR DOCD: CPT | Performed by: UROLOGY

## 2018-02-16 PROCEDURE — 1036F TOBACCO NON-USER: CPT | Performed by: UROLOGY

## 2018-02-16 PROCEDURE — 4040F PNEUMOC VAC/ADMIN/RCVD: CPT | Performed by: UROLOGY

## 2018-02-16 PROCEDURE — G8484 FLU IMMUNIZE NO ADMIN: HCPCS | Performed by: UROLOGY

## 2018-02-16 PROCEDURE — G8417 CALC BMI ABV UP PARAM F/U: HCPCS | Performed by: UROLOGY

## 2018-02-16 PROCEDURE — G8427 DOCREV CUR MEDS BY ELIG CLIN: HCPCS | Performed by: UROLOGY

## 2018-02-16 NOTE — PROGRESS NOTES
[facility] EVALUATION NOTE                                                 H&P                                                                                                                                                 Reason for Visit  Prostate cancer    History of Present Illness  This 71-year-old male with intermediate risk prostate cancer  Receive neoadjuvant and adjuvant hormonal therapy for 2 years in addition to radiation therapy  Last PSA was 0.20 1 year following stopping hormonal therapy  PSA currently is 0.21      Urologic Review of Systems/Symptoms  Denies hematuria  Denies dysuria  Denies incontinence  Denies flank pain  Other Urologic: Denies issues with urination    Review of Systems  Head and neck: No issues/reviewed  Cardiac: No recent issues/reviewed  Pulmonary: No issues/reviewed  Gastrointestinal: No issues/reviewed  Neurologic: No recent issues/reviewed  Extremities: No issues/reviewed  Lymphatics: No lymphadenopathy no change  Genitourinary: See above  Skin: No issues/reviewed  Hospitalization: No recent hospitalization  Medications reviewed  All 14 categories of Review of Systems otherwise reviewed no other findings reported. Past Medical History:   Diagnosis Date    Adenocarcinoma of prostate, stage 2 ([facility] 2015    Jim 3+4, Dr Delta Schneider  PSA 7.69 intermediate risk    BPH with elevated PSA 2014    Dr Diamond  Erectile dysfunction associated with type 2 diabetes mellitus [facility]     Essential hypertension, benign     Low HDL (under 40)     Obesity (BMI 30-39. 9)     Pes planus of both feet 2015    Dr Remigio Johnson S/P colonoscopy with polypectomy 2014    Dr Kenisha Narayan Uncontrolled type 2 diabetes mellitus with microalbuminuria [facility]) 2007    Dr Nohelia Euceda Unspecified deformity of ankle and foot, acquired     Vitamin B12 deficiency anemia 2016     Past Surgical History:   Procedure Laterality Date    COLONOSCOPY  03/20/2014    Polyp, Diverticulosis, Internal Hemorrhoids- .  Musculoskeletal: Normal range of motion. Ambulatory. Extremities: No edema   Neurological: Cranial nerves intact   Skin: Skin is warm and dry. No lesions. No rashes or ulcers   Psychiatric:  Alert and oriented ×3. Assessment  Intermediate risk prostate cancer status post radiation therapy 2 years of hormonal therapy PSA stable at 0.20  Plan  PSA 6 months notify patient will result  PSA 1 year with follow-up  Greater than 50% of 15 minutes spent consulting patient face-to-face  Orders Placed This Encounter   Procedures    PSA, Diagnostic     Standing Status:   Future     Standing Expiration Date:   2/16/2019    PSA, Diagnostic     Standing Status:   Future     Standing Expiration Date:   2/16/2019     No orders of the defined types were placed in this encounter. Julian Cedeno MD       Please note this report has been partially produced using speech recognition software  And may cause contain errors related to that system including grammar, punctuation and spelling as well as words and phrases that may seem inappropriate. If there are questions or concerns please feel free to contact me to clarify.

## 2018-04-10 LAB
ANION GAP SERPL CALCULATED.3IONS-SCNC: 16 MEQ/L (ref 7–13)
BUN BLDV-MCNC: 8 MG/DL (ref 8–23)
CALCIUM SERPL-MCNC: 9.3 MG/DL (ref 8.6–10.2)
CHLORIDE BLD-SCNC: 99 MEQ/L (ref 98–107)
CO2: 25 MEQ/L (ref 22–29)
CREAT SERPL-MCNC: 1.03 MG/DL (ref 0.7–1.2)
CREATININE URINE: 113 MG/DL
GFR AFRICAN AMERICAN: >60
GFR NON-AFRICAN AMERICAN: >60
GLUCOSE BLD-MCNC: 165 MG/DL (ref 74–109)
MICROALBUMIN UR-MCNC: 2.4 MG/DL
MICROALBUMIN/CREAT UR-RTO: 21.2 MG/G (ref 0–30)
POTASSIUM SERPL-SCNC: 3.9 MEQ/L (ref 3.5–5.1)
SODIUM BLD-SCNC: 140 MEQ/L (ref 132–144)

## 2018-04-11 LAB — HBA1C MFR BLD: 7.9 % (ref 4.8–5.9)

## 2018-04-12 RX ORDER — AMLODIPINE BESYLATE AND BENAZEPRIL HYDROCHLORIDE 5; 20 MG/1; MG/1
CAPSULE ORAL
Qty: 30 CAPSULE | Refills: 5 | Status: SHIPPED | OUTPATIENT
Start: 2018-04-12 | End: 2018-10-13 | Stop reason: SDUPTHER

## 2018-04-12 RX ORDER — ATORVASTATIN CALCIUM 20 MG/1
TABLET, FILM COATED ORAL
Qty: 30 TABLET | Refills: 5 | Status: SHIPPED | OUTPATIENT
Start: 2018-04-12 | End: 2018-10-13 | Stop reason: SDUPTHER

## 2018-04-16 RX ORDER — ISOPROPYL ALCOHOL 0.75 G/1
SWAB TOPICAL
Qty: 100 EACH | Refills: 1 | Status: SHIPPED | OUTPATIENT
Start: 2018-04-16 | End: 2018-09-20 | Stop reason: SDUPTHER

## 2018-05-10 RX ORDER — CLONIDINE HYDROCHLORIDE 0.1 MG/1
TABLET ORAL
Qty: 60 TABLET | Refills: 3 | Status: SHIPPED | OUTPATIENT
Start: 2018-05-10 | End: 2018-09-14 | Stop reason: SDUPTHER

## 2018-05-17 ENCOUNTER — OFFICE VISIT (OUTPATIENT)
Dept: INTERNAL MEDICINE CLINIC | Age: 73
End: 2018-05-17
Payer: COMMERCIAL

## 2018-05-17 ENCOUNTER — OFFICE VISIT (OUTPATIENT)
Dept: ENDOCRINOLOGY | Age: 73
End: 2018-05-17
Payer: COMMERCIAL

## 2018-05-17 VITALS
DIASTOLIC BLOOD PRESSURE: 70 MMHG | HEIGHT: 69 IN | HEART RATE: 71 BPM | SYSTOLIC BLOOD PRESSURE: 110 MMHG | BODY MASS INDEX: 29.77 KG/M2 | WEIGHT: 201 LBS

## 2018-05-17 VITALS
OXYGEN SATURATION: 98 % | DIASTOLIC BLOOD PRESSURE: 70 MMHG | BODY MASS INDEX: 29.77 KG/M2 | WEIGHT: 201 LBS | TEMPERATURE: 97.4 F | HEIGHT: 69 IN | SYSTOLIC BLOOD PRESSURE: 110 MMHG | HEART RATE: 71 BPM

## 2018-05-17 DIAGNOSIS — Z11.59 NEED FOR HEPATITIS C SCREENING TEST: ICD-10-CM

## 2018-05-17 DIAGNOSIS — I10 ESSENTIAL HYPERTENSION: Primary | Chronic | ICD-10-CM

## 2018-05-17 LAB
GLUCOSE BLD-MCNC: 130 MG/DL
HEPATITIS C ANTIBODY INTERPRETATION: NORMAL

## 2018-05-17 PROCEDURE — G8427 DOCREV CUR MEDS BY ELIG CLIN: HCPCS | Performed by: INTERNAL MEDICINE

## 2018-05-17 PROCEDURE — 3017F COLORECTAL CA SCREEN DOC REV: CPT | Performed by: INTERNAL MEDICINE

## 2018-05-17 PROCEDURE — 1036F TOBACCO NON-USER: CPT | Performed by: INTERNAL MEDICINE

## 2018-05-17 PROCEDURE — 1123F ACP DISCUSS/DSCN MKR DOCD: CPT | Performed by: INTERNAL MEDICINE

## 2018-05-17 PROCEDURE — 99213 OFFICE O/P EST LOW 20 MIN: CPT | Performed by: INTERNAL MEDICINE

## 2018-05-17 PROCEDURE — G8417 CALC BMI ABV UP PARAM F/U: HCPCS | Performed by: INTERNAL MEDICINE

## 2018-05-17 PROCEDURE — 3045F PR MOST RECENT HEMOGLOBIN A1C LEVEL 7.0-9.0%: CPT | Performed by: INTERNAL MEDICINE

## 2018-05-17 PROCEDURE — 2022F DILAT RTA XM EVC RTNOPTHY: CPT | Performed by: INTERNAL MEDICINE

## 2018-05-17 PROCEDURE — 4040F PNEUMOC VAC/ADMIN/RCVD: CPT | Performed by: INTERNAL MEDICINE

## 2018-05-17 PROCEDURE — 82962 GLUCOSE BLOOD TEST: CPT | Performed by: INTERNAL MEDICINE

## 2018-05-17 ASSESSMENT — ENCOUNTER SYMPTOMS
CHEST TIGHTNESS: 0
TROUBLE SWALLOWING: 0
RESPIRATORY NEGATIVE: 1
BLOOD IN STOOL: 0
GASTROINTESTINAL NEGATIVE: 1
SHORTNESS OF BREATH: 0
ORTHOPNEA: 0
ABDOMINAL PAIN: 0

## 2018-06-13 RX ORDER — GLIMEPIRIDE 4 MG/1
TABLET ORAL
Qty: 60 TABLET | Refills: 5 | Status: SHIPPED | OUTPATIENT
Start: 2018-06-13 | End: 2018-12-10 | Stop reason: SDUPTHER

## 2018-07-24 DIAGNOSIS — C61 PROSTATE CANCER (HCC): ICD-10-CM

## 2018-07-24 LAB — PROSTATE SPECIFIC ANTIGEN: 0.28 NG/ML (ref 0–6.22)

## 2018-07-26 RX ORDER — BLOOD-GLUCOSE METER
1 KIT MISCELLANEOUS 3 TIMES DAILY
Qty: 1 KIT | Refills: 0 | Status: SHIPPED | OUTPATIENT
Start: 2018-07-26 | End: 2021-10-19 | Stop reason: SDUPTHER

## 2018-07-31 RX ORDER — SITAGLIPTIN 100 MG/1
TABLET, FILM COATED ORAL
Qty: 30 TABLET | Refills: 5 | Status: SHIPPED | OUTPATIENT
Start: 2018-07-31 | End: 2019-02-04 | Stop reason: SDUPTHER

## 2018-08-06 RX ORDER — ASPIRIN 81 MG/1
TABLET ORAL
Qty: 30 TABLET | Refills: 5 | Status: SHIPPED | OUTPATIENT
Start: 2018-08-06 | End: 2019-01-07 | Stop reason: SDUPTHER

## 2018-08-30 RX ORDER — LANCETS 30 GAUGE
EACH MISCELLANEOUS
Qty: 100 EACH | Refills: 6 | Status: SHIPPED | OUTPATIENT
Start: 2018-08-30 | End: 2018-08-30 | Stop reason: SDUPTHER

## 2018-08-30 RX ORDER — LANCETS 30 GAUGE
EACH MISCELLANEOUS
Qty: 100 EACH | Refills: 6 | Status: SHIPPED | OUTPATIENT
Start: 2018-08-30 | End: 2019-03-21 | Stop reason: SDUPTHER

## 2018-09-14 RX ORDER — CLONIDINE HYDROCHLORIDE 0.1 MG/1
TABLET ORAL
Qty: 60 TABLET | Refills: 3 | Status: SHIPPED | OUTPATIENT
Start: 2018-09-14 | End: 2019-01-07 | Stop reason: SDUPTHER

## 2018-09-14 NOTE — TELEPHONE ENCOUNTER
PATIENT LAST SEEN 5/17/18  PLEASE APPROVE OR DENY.        Future Appointments  Date Time Provider Leonides Cohen   9/20/2018 3:30 PM Edil Siddiqui MD [address] [place] [address]   9/20/2018 4:00 PM [facility] R[facility], [facility]   2/18/2019 2:30 PM Veena Dietrich MD [facility]

## 2018-09-20 ENCOUNTER — OFFICE VISIT (OUTPATIENT)
Dept: ENDOCRINOLOGY | Age: 73
End: 2018-09-20
Payer: COMMERCIAL

## 2018-09-20 ENCOUNTER — OFFICE VISIT (OUTPATIENT)
Dept: INTERNAL MEDICINE CLINIC | Age: 73
End: 2018-09-20
Payer: COMMERCIAL

## 2018-09-20 VITALS
HEIGHT: 69 IN | DIASTOLIC BLOOD PRESSURE: 79 MMHG | OXYGEN SATURATION: 98 % | HEART RATE: 70 BPM | SYSTOLIC BLOOD PRESSURE: 135 MMHG | TEMPERATURE: 97.2 F | WEIGHT: 193 LBS | BODY MASS INDEX: 28.58 KG/M2

## 2018-09-20 VITALS
BODY MASS INDEX: 28.58 KG/M2 | HEART RATE: 70 BPM | DIASTOLIC BLOOD PRESSURE: 80 MMHG | HEIGHT: 69 IN | SYSTOLIC BLOOD PRESSURE: 118 MMHG | WEIGHT: 193 LBS

## 2018-09-20 DIAGNOSIS — D64.9 NORMOCYTIC ANEMIA: ICD-10-CM

## 2018-09-20 DIAGNOSIS — I10 ESSENTIAL HYPERTENSION, BENIGN: ICD-10-CM

## 2018-09-20 DIAGNOSIS — I10 ESSENTIAL HYPERTENSION, BENIGN: Primary | ICD-10-CM

## 2018-09-20 LAB
ALBUMIN SERPL-MCNC: 4.5 G/DL (ref 3.9–4.9)
ALP BLD-CCNC: 81 U/L (ref 35–104)
ALT SERPL-CCNC: 14 U/L (ref 0–41)
ANION GAP SERPL CALCULATED.3IONS-SCNC: 15 MEQ/L (ref 7–13)
AST SERPL-CCNC: 13 U/L (ref 0–40)
BASOPHILS ABSOLUTE: 0 K/UL (ref 0–0.2)
BASOPHILS RELATIVE PERCENT: 0.4 %
BILIRUB SERPL-MCNC: 0.5 MG/DL (ref 0–1.2)
BUN BLDV-MCNC: 10 MG/DL (ref 8–23)
CALCIUM SERPL-MCNC: 9.9 MG/DL (ref 8.6–10.2)
CHLORIDE BLD-SCNC: 101 MEQ/L (ref 98–107)
CO2: 25 MEQ/L (ref 22–29)
CREAT SERPL-MCNC: 1.06 MG/DL (ref 0.7–1.2)
EOSINOPHILS ABSOLUTE: 0.2 K/UL (ref 0–0.7)
EOSINOPHILS RELATIVE PERCENT: 3.2 %
GFR AFRICAN AMERICAN: >60
GFR NON-AFRICAN AMERICAN: >60
GLOBULIN: 3.1 G/DL (ref 2.3–3.5)
GLUCOSE BLD-MCNC: 59 MG/DL (ref 74–109)
GLUCOSE BLD-MCNC: 69 MG/DL
HBA1C MFR BLD: 8.1 %
HCT VFR BLD CALC: 38.8 % (ref 42–52)
HEMOGLOBIN: 12.6 G/DL (ref 14–18)
LYMPHOCYTES ABSOLUTE: 1.8 K/UL (ref 1–4.8)
LYMPHOCYTES RELATIVE PERCENT: 29.5 %
MCH RBC QN AUTO: 26.5 PG (ref 27–31.3)
MCHC RBC AUTO-ENTMCNC: 32.5 % (ref 33–37)
MCV RBC AUTO: 81.4 FL (ref 80–100)
MONOCYTES ABSOLUTE: 0.8 K/UL (ref 0.2–0.8)
MONOCYTES RELATIVE PERCENT: 12.4 %
NEUTROPHILS ABSOLUTE: 3.4 K/UL (ref 1.4–6.5)
NEUTROPHILS RELATIVE PERCENT: 54.5 %
PDW BLD-RTO: 16.1 % (ref 11.5–14.5)
PLATELET # BLD: 243 K/UL (ref 130–400)
POTASSIUM SERPL-SCNC: 4 MEQ/L (ref 3.5–5.1)
RBC # BLD: 4.76 M/UL (ref 4.7–6.1)
SODIUM BLD-SCNC: 141 MEQ/L (ref 132–144)
TOTAL PROTEIN: 7.6 G/DL (ref 6.4–8.1)
WBC # BLD: 6.3 K/UL (ref 4.8–10.8)

## 2018-09-20 PROCEDURE — 1101F PT FALLS ASSESS-DOCD LE1/YR: CPT | Performed by: INTERNAL MEDICINE

## 2018-09-20 PROCEDURE — 99213 OFFICE O/P EST LOW 20 MIN: CPT | Performed by: INTERNAL MEDICINE

## 2018-09-20 PROCEDURE — 3017F COLORECTAL CA SCREEN DOC REV: CPT | Performed by: INTERNAL MEDICINE

## 2018-09-20 PROCEDURE — 2022F DILAT RTA XM EVC RTNOPTHY: CPT | Performed by: INTERNAL MEDICINE

## 2018-09-20 PROCEDURE — 83036 HEMOGLOBIN GLYCOSYLATED A1C: CPT | Performed by: INTERNAL MEDICINE

## 2018-09-20 PROCEDURE — 82962 GLUCOSE BLOOD TEST: CPT | Performed by: INTERNAL MEDICINE

## 2018-09-20 PROCEDURE — G8417 CALC BMI ABV UP PARAM F/U: HCPCS | Performed by: INTERNAL MEDICINE

## 2018-09-20 PROCEDURE — 4040F PNEUMOC VAC/ADMIN/RCVD: CPT | Performed by: INTERNAL MEDICINE

## 2018-09-20 PROCEDURE — 1036F TOBACCO NON-USER: CPT | Performed by: INTERNAL MEDICINE

## 2018-09-20 PROCEDURE — G8428 CUR MEDS NOT DOCUMENT: HCPCS | Performed by: INTERNAL MEDICINE

## 2018-09-20 PROCEDURE — 3045F PR MOST RECENT HEMOGLOBIN A1C LEVEL 7.0-9.0%: CPT | Performed by: INTERNAL MEDICINE

## 2018-09-20 PROCEDURE — 1123F ACP DISCUSS/DSCN MKR DOCD: CPT | Performed by: INTERNAL MEDICINE

## 2018-09-20 PROCEDURE — G8427 DOCREV CUR MEDS BY ELIG CLIN: HCPCS | Performed by: INTERNAL MEDICINE

## 2018-09-20 RX ORDER — ISOPROPYL ALCOHOL 0.75 G/1
SWAB TOPICAL
Qty: 100 EACH | Refills: 1 | Status: SHIPPED | OUTPATIENT
Start: 2018-09-20 | End: 2018-12-20 | Stop reason: SDUPTHER

## 2018-09-20 ASSESSMENT — PATIENT HEALTH QUESTIONNAIRE - PHQ9
2. FEELING DOWN, DEPRESSED OR HOPELESS: 0
1. LITTLE INTEREST OR PLEASURE IN DOING THINGS: 0
SUM OF ALL RESPONSES TO PHQ QUESTIONS 1-9: 0
SUM OF ALL RESPONSES TO PHQ QUESTIONS 1-9: 0
SUM OF ALL RESPONSES TO PHQ9 QUESTIONS 1 & 2: 0

## 2018-09-20 ASSESSMENT — ENCOUNTER SYMPTOMS
CHEST TIGHTNESS: 0
BLOOD IN STOOL: 0
TROUBLE SWALLOWING: 0
ORTHOPNEA: 0
ABDOMINAL PAIN: 0
SHORTNESS OF BREATH: 0

## 2018-09-20 NOTE — PROGRESS NOTES
Mi Howard is a 68 y.o. male nonsmoker, history of diabetes, prostate cancer treated, dyslipidemia, who presents with     Chief Complaint   Patient presents with    Hypertension    Anemia       Interim history: Since his last office visit with me 4 months ago, the patient is followed with the endocrinologist.  No emergency room or hospital admissions. Overall, feels well. Continues to live independently. States he is active doing outdoor work such as mowing lawns. Remains fully independent in all activities of daily living and with no perceived decline in memory or physical abilities. The following laboratory reports since the past visit were reviewed (the ones pertinent to today's visit were discussed with the patient):    Orders Only on 09/20/2018   Component Date Value Ref Range Status    Sodium 09/20/2018 141  132 - 144 mEq/L Final    Potassium 09/20/2018 4.0  3.5 - 5.1 mEq/L Final    Chloride 09/20/2018 101  98 - 107 mEq/L Final    CO2 09/20/2018 25  22 - 29 mEq/L Final    Anion Gap 09/20/2018 15* 7 - 13 mEq/L Final    Glucose 09/20/2018 59* 74 - 109 mg/dL Final    BUN 09/20/2018 10  8 - 23 mg/dL Final    CREATININE 09/20/2018 1.06  0.70 - 1.20 mg/dL Final    GFR Non- 09/20/2018 >60.0  >60 Final    Comment: >60 mL/min/1.73m2 EGFR, calc. for ages 25 and older using the  MDRD formula (not corrected for weight), is valid for stable  renal function.  GFR  09/20/2018 >60.0  >60 Final    Comment: >60 mL/min/1.73m2 EGFR, calc. for ages 25 and older using the  MDRD formula (not corrected for weight), is valid for stable  renal function.       Calcium 09/20/2018 9.9  8.6 - 10.2 mg/dL Final    Total Protein 09/20/2018 7.6  6.4 - 8.1 g/dL Final    Alb 09/20/2018 4.5  3.9 - 4.9 g/dL Final    Total Bilirubin 09/20/2018 0.5  0.0 - 1.2 mg/dL Final    Alkaline Phosphatase 09/20/2018 81  35 - 104 U/L Final    ALT 09/20/2018 14  0 - 41 U/L Final    AST 09/20/2018 13  0 - 40 U/L Final    Globulin 09/20/2018 3.1  2.3 - 3.5 g/dL Final   Orders Only on 09/20/2018   Component Date Value Ref Range Status    WBC 09/20/2018 6.3  4.8 - 10.8 K/uL Final    RBC 09/20/2018 4.76  4.70 - 6.10 M/uL Final    Hemoglobin 09/20/2018 12.6* 14.0 - 18.0 g/dL Final    Hematocrit 09/20/2018 38.8* 42.0 - 52.0 % Final    MCV 09/20/2018 81.4  80.0 - 100.0 fL Final    MCH 09/20/2018 26.5* 27.0 - 31.3 pg Final    MCHC 09/20/2018 32.5* 33.0 - 37.0 % Final    RDW 09/20/2018 16.1* 11.5 - 14.5 % Final    Platelets 29/88/3797 243  130 - 400 K/uL Final    Neutrophils % 09/20/2018 54.5  % Final    Lymphocytes % 09/20/2018 29.5  % Final    Monocytes % 09/20/2018 12.4  % Final    Eosinophils % 09/20/2018 3.2  % Final    Basophils % 09/20/2018 0.4  % Final    Neutrophils # 09/20/2018 3.4  1.4 - 6.5 K/uL Final    Lymphocytes # 09/20/2018 1.8  1.0 - 4.8 K/uL Final    Monocytes # 09/20/2018 0.8  0.2 - 0.8 K/uL Final    Eosinophils # 09/20/2018 0.2  0.0 - 0.7 K/uL Final    Basophils # 09/20/2018 0.0  0.0 - 0.2 K/uL Final   Office Visit on 09/20/2018   Component Date Value Ref Range Status    Glucose 09/20/2018 69  mg/dL Final    random    Hemoglobin A1C 09/20/2018 8.1  % Final   Orders Only on 07/24/2018   Component Date Value Ref Range Status    PSA 07/24/2018 0.28  0.00 - 6.22 ng/mL Final       HPI:    Hypertension   This is a chronic problem. The current episode started more than 1 year ago. The problem has been waxing and waning since onset. The problem is controlled. Associated symptoms include anxiety. Pertinent negatives include no chest pain, headaches, neck pain, orthopnea, palpitations, peripheral edema, PND or shortness of breath. Agents associated with hypertension include NSAIDs. Risk factors for coronary artery disease include diabetes mellitus, obesity and sedentary lifestyle. Past treatments include ACE inhibitors, calcium channel blockers and central alpha agonists.  The current Social History Narrative    Born in [place], one of 6    Lives in a house in [facility] with girlfriend    2 sons in [facility], keeps in touch with one of them    Grands: many    Worked \"everywhere\", on disability         Hobbies: video games       Family History   Problem Relation Age of Onset    Stroke Mother         dec age [de-identified]    Cancer Brother         liver, dec age 79    Diabetes Father     Cancer Father         prostate       Family and social history were reviewed and pertinent changes documented. ALLERGIES    Patient has no known allergies.     Current Outpatient Prescriptions on File Prior to Visit   Medication Sig Dispense Refill    cloNIDine (CATAPRES) 0.1 MG tablet take 1 tablet by mouth twice a day 60 tablet 3    blood glucose test strips (ONE TOUCH ULTRA TEST) strip TEST once daily, DX: E11.65, IDDM 100 strip 6    Lancets MISC TEST once daily, DX: E11.65, IDDM 100 each 6    Insulin Pen Needle (B-D UF III MINI PEN NEEDLES) 31G X 5 MM MISC USE AS DIRECTED DAILY 100 each 3    aspirin 81 MG EC tablet take 1 tablet by mouth once daily 30 tablet 5    JANUVIA 100 MG tablet take 1 tablet by mouth once daily 30 tablet 5    Blood Glucose Monitoring Suppl (ONE TOUCH ULTRA MINI) w/Device KIT 1 kit by Does not apply route 3 times daily DX: E11.65, IDDM 1 kit 0    glimepiride (AMARYL) 4 MG tablet take 1 tablet by mouth twice a day with meals 60 tablet 5    metFORMIN (GLUCOPHAGE) 1000 MG tablet take 1 tablet by mouth twice a day with meals 60 tablet 3    RA VITAMIN D-3 1000 units TABS tablet take 1 tablet by mouth once daily 30 tablet 5    amLODIPine-benazepril (LOTREL) 5-20 MG per capsule take 1 capsule by mouth once daily 30 capsule 5    atorvastatin (LIPITOR) 20 MG tablet take 1 tablet by mouth once daily 30 tablet 5    metFORMIN (GLUCOPHAGE) 1000 MG tablet Take 1 tablet by mouth 2 times daily (with meals) 60 tablet 3    SITagliptin (JANUVIA) 100 MG tablet take 1 tablet by mouth once daily 30 tablet 5    glimepiride (AMARYL) 2 MG tablet Take at dinner 30 tablet 3    ibuprofen (ADVIL;MOTRIN) 600 MG tablet take 1 tablet twice a day if needed for pain 60 tablet 3    sildenafil (REVATIO) 20 MG tablet Take one tablet to five tablets (maximum of five in one day) AS NEEDED on an empty stomach two hours before planned sexual activity. 30 tablet 11    ONE TOUCH ULTRASOFT LANCETS MISC TEST once daily 100 each 3    RA VITAMIN B-12 TR 1000 MCG TBCR take 1 tablet by mouth once daily 30 tablet 5    tadalafil (CIALIS) 20 MG tablet Take 1 tablet by mouth as needed for Erectile Dysfunction. 3 tablet 3     No current facility-administered medications on file prior to visit. Review of Systems   Constitutional: Negative for activity change, appetite change, fatigue and unexpected weight change. HENT: Negative for nosebleeds and trouble swallowing. Eyes: Negative for visual disturbance. Respiratory: Negative for chest tightness and shortness of breath. Cardiovascular: Negative for chest pain, palpitations, orthopnea, leg swelling and PND. Gastrointestinal: Negative for abdominal pain and blood in stool. Endocrine: Negative. Negative for cold intolerance, heat intolerance, polydipsia and polyuria. Genitourinary: Negative for dysuria, flank pain and hematuria. Musculoskeletal: Positive for arthralgias (knees). Negative for gait problem, myalgias and neck pain. Skin: Negative for rash. Neurological: Negative for dizziness, speech difficulty, weakness, light-headedness, numbness and headaches. Psychiatric/Behavioral: Positive for decreased concentration. Negative for confusion, dysphoric mood and sleep disturbance. The patient is nervous/anxious.         Vitals:    09/20/18 1629 09/20/18 1632   BP: (!) 146/86 135/79   Site: Left Upper Arm Left Upper Arm   Position: Sitting Sitting   Cuff Size: Medium Adult Medium Adult   Pulse: 70    Temp: 97.2 °F (36.2 °C)    TempSrc: Tympanic    SpO2: 98%

## 2018-09-25 NOTE — PROGRESS NOTES
(1.74 m)       Objective:   Physical Exam   Constitutional: He appears well-developed and well-nourished. HENT:   Head: Normocephalic and atraumatic. Eyes: Conjunctivae are normal.   Neck: Neck supple. Cardiovascular: Normal rate. Pulmonary/Chest: Effort normal.   Musculoskeletal: Normal range of motion. Neurological: He is alert. Psychiatric: He has a normal mood and affect. Assessment:       Diagnosis Orders   1.  Uncontrolled type 2 diabetes mellitus with microalbuminuria, with long-term current use of insulin ([facility])  POCT Glucose    POCT glycosylated hemoglobin (Hb A1C)    Basic Metabolic Panel           Plan:      Orders Placed This Encounter   Procedures    Basic Metabolic Panel     Standing Status:   Future     Number of Occurrences:   1     Standing Expiration Date:   9/20/2019    POCT Glucose    POCT glycosylated hemoglobin (Hb A1C)     Orders Placed This Encounter   Medications    insulin glargine (LANTUS SOLOSTAR) 100 UNIT/ML injection pen     Sig: 10 units at bedtime     Dispense:  5 pen     Refill:  3    Alcohol Swabs (B-D SINGLE USE SWABS REGULAR) PADS     Sig: use as directed once daily     Dispense:  100 each     Refill:  1     Continue metformin januvia amaryl at current dose   hbaic goal of 7         Daniella Rubio MD

## 2018-10-13 RX ORDER — AMLODIPINE BESYLATE AND BENAZEPRIL HYDROCHLORIDE 5; 20 MG/1; MG/1
CAPSULE ORAL
Qty: 30 CAPSULE | Refills: 5 | Status: SHIPPED | OUTPATIENT
Start: 2018-10-13 | End: 2019-03-21 | Stop reason: SDUPTHER

## 2018-10-13 RX ORDER — ATORVASTATIN CALCIUM 20 MG/1
TABLET, FILM COATED ORAL
Qty: 30 TABLET | Refills: 5 | Status: SHIPPED | OUTPATIENT
Start: 2018-10-13 | End: 2019-03-21 | Stop reason: SDUPTHER

## 2018-12-10 RX ORDER — AVOBENZONE, HOMOSALATE, OCTISALATE, OCTOCRYLENE 30; 100; 50; 25 MG/ML; MG/ML; MG/ML; MG/ML
SPRAY TOPICAL
Qty: 30 TABLET | Refills: 5 | Status: SHIPPED | OUTPATIENT
Start: 2018-12-10 | End: 2019-05-27 | Stop reason: SDUPTHER

## 2018-12-10 RX ORDER — GLIMEPIRIDE 4 MG/1
TABLET ORAL
Qty: 60 TABLET | Refills: 5 | Status: SHIPPED | OUTPATIENT
Start: 2018-12-10 | End: 2019-05-22 | Stop reason: SDUPTHER

## 2018-12-20 ENCOUNTER — OFFICE VISIT (OUTPATIENT)
Dept: INTERNAL MEDICINE CLINIC | Age: 73
End: 2018-12-20
Payer: COMMERCIAL

## 2018-12-20 ENCOUNTER — OFFICE VISIT (OUTPATIENT)
Dept: ENDOCRINOLOGY | Age: 73
End: 2018-12-20
Payer: COMMERCIAL

## 2018-12-20 VITALS
HEIGHT: 68 IN | SYSTOLIC BLOOD PRESSURE: 132 MMHG | HEART RATE: 82 BPM | OXYGEN SATURATION: 96 % | BODY MASS INDEX: 27.89 KG/M2 | WEIGHT: 184 LBS | DIASTOLIC BLOOD PRESSURE: 85 MMHG

## 2018-12-20 VITALS
HEART RATE: 75 BPM | TEMPERATURE: 96.4 F | WEIGHT: 194 LBS | SYSTOLIC BLOOD PRESSURE: 132 MMHG | DIASTOLIC BLOOD PRESSURE: 86 MMHG | BODY MASS INDEX: 29.4 KG/M2 | OXYGEN SATURATION: 99 % | HEIGHT: 68 IN

## 2018-12-20 DIAGNOSIS — E78.5 HYPERLIPIDEMIA WITH TARGET LDL LESS THAN 100: ICD-10-CM

## 2018-12-20 DIAGNOSIS — B35.1 ONYCHOMYCOSIS: ICD-10-CM

## 2018-12-20 DIAGNOSIS — I10 ESSENTIAL HYPERTENSION, BENIGN: Primary | ICD-10-CM

## 2018-12-20 LAB — GLUCOSE BLD-MCNC: 205 MG/DL

## 2018-12-20 PROCEDURE — 1123F ACP DISCUSS/DSCN MKR DOCD: CPT | Performed by: INTERNAL MEDICINE

## 2018-12-20 PROCEDURE — 2022F DILAT RTA XM EVC RTNOPTHY: CPT | Performed by: INTERNAL MEDICINE

## 2018-12-20 PROCEDURE — 1101F PT FALLS ASSESS-DOCD LE1/YR: CPT | Performed by: INTERNAL MEDICINE

## 2018-12-20 PROCEDURE — 3017F COLORECTAL CA SCREEN DOC REV: CPT | Performed by: INTERNAL MEDICINE

## 2018-12-20 PROCEDURE — 1036F TOBACCO NON-USER: CPT | Performed by: INTERNAL MEDICINE

## 2018-12-20 PROCEDURE — G8427 DOCREV CUR MEDS BY ELIG CLIN: HCPCS | Performed by: INTERNAL MEDICINE

## 2018-12-20 PROCEDURE — G8484 FLU IMMUNIZE NO ADMIN: HCPCS | Performed by: INTERNAL MEDICINE

## 2018-12-20 PROCEDURE — 82962 GLUCOSE BLOOD TEST: CPT | Performed by: INTERNAL MEDICINE

## 2018-12-20 PROCEDURE — 4040F PNEUMOC VAC/ADMIN/RCVD: CPT | Performed by: INTERNAL MEDICINE

## 2018-12-20 PROCEDURE — 3045F PR MOST RECENT HEMOGLOBIN A1C LEVEL 7.0-9.0%: CPT | Performed by: INTERNAL MEDICINE

## 2018-12-20 PROCEDURE — 99213 OFFICE O/P EST LOW 20 MIN: CPT | Performed by: INTERNAL MEDICINE

## 2018-12-20 PROCEDURE — G8417 CALC BMI ABV UP PARAM F/U: HCPCS | Performed by: INTERNAL MEDICINE

## 2018-12-20 RX ORDER — ISOPROPYL ALCOHOL 0.75 G/1
SWAB TOPICAL
Qty: 100 EACH | Refills: 1 | Status: SHIPPED | OUTPATIENT
Start: 2018-12-20 | End: 2019-07-04 | Stop reason: SDUPTHER

## 2018-12-20 ASSESSMENT — ENCOUNTER SYMPTOMS
CHEST TIGHTNESS: 0
ORTHOPNEA: 0
ABDOMINAL PAIN: 0
TROUBLE SWALLOWING: 0
BLOOD IN STOOL: 0
SHORTNESS OF BREATH: 0

## 2018-12-21 NOTE — PROGRESS NOTES
house in [facility] with girlfriend    2 sons in [place], keeps in touch with one of them    Grands: many    Worked \"everywhere\", on disability         Hobbies: video games       Family History   Problem Relation Age of Onset    Stroke Mother         dec age [de-identified]    Cancer Brother         liver, dec age 79    Diabetes Father     Cancer Father         prostate       Family and socialhistory were reviewed and pertinent changes documented. ALLERGIES    Patient has no known allergies.     Current Outpatient Prescriptions on File Prior to Visit   Medication Sig Dispense Refill    insulin glargine (LANTUS SOLOSTAR) 100 UNIT/ML injection pen 10 units at bedtime 5 pen 3    Insulin Pen Needle (B-D UF III MINI PEN NEEDLES) 31G X 5 MM MISC USE AS DIRECTED DAILY 100 each 3    Alcohol Swabs (B-D SINGLE USE SWABS REGULAR) PADS use as directed once daily 100 each 1    RA VITAMIN D-3 1000 units TABS tablet take 1 tablet by mouth once daily 30 tablet 5    glimepiride (AMARYL) 4 MG tablet take 1 tablet by mouth twice a day with meals 60 tablet 5    metFORMIN (GLUCOPHAGE) 1000 MG tablet take 1 tablet by mouth twice a day with meals 60 tablet 3    atorvastatin (LIPITOR) 20 MG tablet take 1 tablet by mouth once daily 30 tablet 5    amLODIPine-benazepril (LOTREL) 5-20 MG per capsule take 1 capsule by mouth once daily 30 capsule 5    cloNIDine (CATAPRES) 0.1 MG tablet take 1 tablet by mouth twice a day 60 tablet 3    blood glucose test strips (ONE TOUCH ULTRA TEST) strip TEST once daily, DX: E11.65, IDDM 100 strip 6    Lancets MISC TEST once daily, DX: E11.65, IDDM 100 each 6    aspirin 81 MG EC tablet take 1 tablet by mouth once daily 30 tablet 5    JANUVIA 100 MG tablet take 1 tablet by mouth once daily 30 tablet 5    Blood Glucose Monitoring Suppl (ONE TOUCH ULTRA MINI) w/Device KIT 1 kit by Does not apply route 3 times daily DX: E11.65, IDDM 1 kit 0    metFORMIN (GLUCOPHAGE) 1000 MG tablet Take 1 tablet by mouth 2 times

## 2018-12-26 NOTE — PROGRESS NOTES
Subjective:      Patient ID: Yapril Mcnally is a 68 y.o. male. 3 month f/u  Diabetes   He presents for his follow-up diabetic visit. Diabetic complications include impotence. Risk factors for coronary artery disease include diabetes mellitus and male sex. Current diabetic treatment includes insulin injections (lantus metformin januvia and amaryl ). His overall blood glucose range is 140-180 mg/dl. An ACE inhibitor/angiotensin II receptor blocker is being taken. No recent labs to review     Patient Active Problem List   Diagnosis    Essential hypertension, benign    Obesity (BMI 30-39. 9)    Hyperlipidemia with target LDL less than 100    Low HDL (under 40)    Pes planus    Normocytic anemia    Erectile dysfunction associated with type 2 diabetes mellitus (HCC)    Adenocarcinoma of prostate, stage 2 (HCC)    Knee pain    Anemia due to vitamin B12 deficiency    Hypercalcemia    Uncontrolled type 2 diabetes mellitus with microalbuminuria (HCC)    Prostate cancer (HCC)     No Known Allergies    Current Outpatient Prescriptions:     insulin glargine (LANTUS SOLOSTAR) 100 UNIT/ML injection pen, 10 units at bedtime, Disp: 5 pen, Rfl: 3    Insulin Pen Needle (B-D UF III MINI PEN NEEDLES) 31G X 5 MM MISC, USE AS DIRECTED DAILY, Disp: 100 each, Rfl: 3    Alcohol Swabs (B-D SINGLE USE SWABS REGULAR) PADS, use as directed once daily, Disp: 100 each, Rfl: 1    RA VITAMIN D-3 1000 units TABS tablet, take 1 tablet by mouth once daily, Disp: 30 tablet, Rfl: 5    glimepiride (AMARYL) 4 MG tablet, take 1 tablet by mouth twice a day with meals, Disp: 60 tablet, Rfl: 5    metFORMIN (GLUCOPHAGE) 1000 MG tablet, take 1 tablet by mouth twice a day with meals, Disp: 60 tablet, Rfl: 3    atorvastatin (LIPITOR) 20 MG tablet, take 1 tablet by mouth once daily, Disp: 30 tablet, Rfl: 5    amLODIPine-benazepril (LOTREL) 5-20 MG per capsule, take 1 capsule by mouth once daily, Disp: 30 capsule, Rfl: 5    cloNIDine (CATAPRES) 0.1 MG tablet, take 1 tablet by mouth twice a day, Disp: 60 tablet, Rfl: 3    blood glucose test strips (ONE TOUCH ULTRA TEST) strip, TEST once daily, DX: E11.65, IDDM, Disp: 100 strip, Rfl: 6    Lancets MISC, TEST once daily, DX: E11.65, IDDM, Disp: 100 each, Rfl: 6    aspirin 81 MG EC tablet, take 1 tablet by mouth once daily, Disp: 30 tablet, Rfl: 5    JANUVIA 100 MG tablet, take 1 tablet by mouth once daily, Disp: 30 tablet, Rfl: 5    Blood Glucose Monitoring Suppl (ONE TOUCH ULTRA MINI) w/Device KIT, 1 kit by Does not apply route 3 times daily DX: E11.65, IDDM, Disp: 1 kit, Rfl: 0    metFORMIN (GLUCOPHAGE) 1000 MG tablet, Take 1 tablet by mouth 2 times daily (with meals), Disp: 60 tablet, Rfl: 3    SITagliptin (JANUVIA) 100 MG tablet, take 1 tablet by mouth once daily, Disp: 30 tablet, Rfl: 5    sildenafil (REVATIO) 20 MG tablet, Take one tablet to five tablets (maximum of five in one day) AS NEEDED on an empty stomach two hours before planned sexual activity. , Disp: 30 tablet, Rfl: 11    ONE TOUCH ULTRASOFT LANCETS MISC, TEST once daily, Disp: 100 each, Rfl: 3    RA VITAMIN B-12 TR 1000 MCG TBCR, take 1 tablet by mouth once daily, Disp: 30 tablet, Rfl: 5    tadalafil (CIALIS) 20 MG tablet, Take 1 tablet by mouth as needed for Erectile Dysfunction. , Disp: 3 tablet, Rfl: 3      Review of Systems   Genitourinary: Positive for impotence. Vitals:    12/20/18 1436   BP: 132/85   Pulse: 82   SpO2: 96%   Weight: 184 lb (83.5 kg)   Height: 5' 8\" (1.727 m)       Objective:   Physical Exam   Constitutional: He appears well-developed and well-nourished. HENT:   Head: Normocephalic and atraumatic. Eyes: Conjunctivae are normal.   Neck: Neck supple. Cardiovascular: Normal rate. Musculoskeletal: Normal range of motion. Feet:    Neurological: He is alert. Psychiatric: He has a normal mood and affect. Assessment:       Diagnosis Orders   1.  Uncontrolled type 2 diabetes

## 2019-01-07 RX ORDER — CLONIDINE HYDROCHLORIDE 0.1 MG/1
TABLET ORAL
Qty: 60 TABLET | Refills: 5 | Status: SHIPPED | OUTPATIENT
Start: 2019-01-07 | End: 2019-06-24 | Stop reason: SDUPTHER

## 2019-01-07 RX ORDER — ACETAMINOPHEN/DIPHENHYDRAMINE 500MG-25MG
TABLET ORAL
Qty: 30 TABLET | Refills: 5 | Status: SHIPPED | OUTPATIENT
Start: 2019-01-07 | End: 2019-06-24 | Stop reason: SDUPTHER

## 2019-02-04 RX ORDER — SITAGLIPTIN 100 MG/1
TABLET, FILM COATED ORAL
Qty: 30 TABLET | Refills: 5 | Status: SHIPPED | OUTPATIENT
Start: 2019-02-04 | End: 2019-07-29 | Stop reason: SDUPTHER

## 2019-02-05 ENCOUNTER — TELEPHONE (OUTPATIENT)
Dept: UROLOGY | Age: 74
End: 2019-02-05

## 2019-02-05 DIAGNOSIS — C61 PROSTATE CANCER (HCC): Primary | ICD-10-CM

## 2019-02-05 DIAGNOSIS — E78.5 HYPERLIPIDEMIA WITH TARGET LDL LESS THAN 100: ICD-10-CM

## 2019-02-05 DIAGNOSIS — C61 PROSTATE CANCER (HCC): ICD-10-CM

## 2019-02-05 LAB
ANION GAP SERPL CALCULATED.3IONS-SCNC: 17 MEQ/L (ref 7–13)
BUN BLDV-MCNC: 19 MG/DL (ref 8–23)
CALCIUM SERPL-MCNC: 9.6 MG/DL (ref 8.6–10.2)
CHLORIDE BLD-SCNC: 101 MEQ/L (ref 98–107)
CHOLESTEROL, TOTAL: 140 MG/DL (ref 0–199)
CO2: 23 MEQ/L (ref 22–29)
CREAT SERPL-MCNC: 1.28 MG/DL (ref 0.7–1.2)
CREATININE URINE: 206.5 MG/DL
GFR AFRICAN AMERICAN: >60
GFR NON-AFRICAN AMERICAN: 55.2
GLUCOSE BLD-MCNC: 104 MG/DL (ref 74–109)
HBA1C MFR BLD: 7.9 % (ref 4.8–5.9)
HDLC SERPL-MCNC: 29 MG/DL (ref 40–59)
LDL CHOLESTEROL CALCULATED: 92 MG/DL (ref 0–129)
MICROALBUMIN UR-MCNC: 3.4 MG/DL
MICROALBUMIN/CREAT UR-RTO: 16.5 MG/G (ref 0–30)
POTASSIUM SERPL-SCNC: 3.7 MEQ/L (ref 3.5–5.1)
PROSTATE SPECIFIC ANTIGEN: 0.22 NG/ML (ref 0–6.22)
SODIUM BLD-SCNC: 141 MEQ/L (ref 132–144)
TRIGL SERPL-MCNC: 93 MG/DL (ref 0–200)

## 2019-02-12 ENCOUNTER — OFFICE VISIT (OUTPATIENT)
Dept: UROLOGY | Age: 74
End: 2019-02-12
Payer: COMMERCIAL

## 2019-02-12 VITALS
WEIGHT: 194.2 LBS | DIASTOLIC BLOOD PRESSURE: 86 MMHG | HEIGHT: 69 IN | BODY MASS INDEX: 28.76 KG/M2 | SYSTOLIC BLOOD PRESSURE: 126 MMHG | HEART RATE: 86 BPM

## 2019-02-12 DIAGNOSIS — C61 ADENOCARCINOMA OF PROSTATE, STAGE 2 (HCC): Primary | ICD-10-CM

## 2019-02-12 PROCEDURE — G8484 FLU IMMUNIZE NO ADMIN: HCPCS | Performed by: UROLOGY

## 2019-02-12 PROCEDURE — 1036F TOBACCO NON-USER: CPT | Performed by: UROLOGY

## 2019-02-12 PROCEDURE — 4040F PNEUMOC VAC/ADMIN/RCVD: CPT | Performed by: UROLOGY

## 2019-02-12 PROCEDURE — G8417 CALC BMI ABV UP PARAM F/U: HCPCS | Performed by: UROLOGY

## 2019-02-12 PROCEDURE — G8427 DOCREV CUR MEDS BY ELIG CLIN: HCPCS | Performed by: UROLOGY

## 2019-02-12 PROCEDURE — 1101F PT FALLS ASSESS-DOCD LE1/YR: CPT | Performed by: UROLOGY

## 2019-02-12 PROCEDURE — 99213 OFFICE O/P EST LOW 20 MIN: CPT | Performed by: UROLOGY

## 2019-02-12 PROCEDURE — 1123F ACP DISCUSS/DSCN MKR DOCD: CPT | Performed by: UROLOGY

## 2019-02-12 PROCEDURE — 3017F COLORECTAL CA SCREEN DOC REV: CPT | Performed by: UROLOGY

## 2019-02-12 RX ORDER — SILDENAFIL 100 MG/1
100 TABLET, FILM COATED ORAL DAILY PRN
Qty: 5 TABLET | Refills: 11 | Status: SHIPPED | OUTPATIENT
Start: 2019-02-12

## 2019-03-21 ENCOUNTER — OFFICE VISIT (OUTPATIENT)
Dept: ENDOCRINOLOGY | Age: 74
End: 2019-03-21
Payer: COMMERCIAL

## 2019-03-21 ENCOUNTER — HOSPITAL ENCOUNTER (OUTPATIENT)
Dept: GENERAL RADIOLOGY | Age: 74
Discharge: HOME | End: 2019-03-23
Payer: COMMERCIAL

## 2019-03-21 ENCOUNTER — OFFICE VISIT (OUTPATIENT)
Dept: INTERNAL MEDICINE CLINIC | Age: 74
End: 2019-03-21
Payer: COMMERCIAL

## 2019-03-21 VITALS
WEIGHT: 193 LBS | OXYGEN SATURATION: 96 % | DIASTOLIC BLOOD PRESSURE: 88 MMHG | HEART RATE: 66 BPM | SYSTOLIC BLOOD PRESSURE: 128 MMHG | TEMPERATURE: 97 F | HEIGHT: 67 IN | BODY MASS INDEX: 30.29 KG/M2

## 2019-03-21 VITALS
SYSTOLIC BLOOD PRESSURE: 108 MMHG | HEIGHT: 67 IN | HEART RATE: 64 BPM | WEIGHT: 193 LBS | DIASTOLIC BLOOD PRESSURE: 72 MMHG | BODY MASS INDEX: 30.29 KG/M2

## 2019-03-21 DIAGNOSIS — Z76.0 ENCOUNTER FOR MEDICATION REFILL: ICD-10-CM

## 2019-03-21 DIAGNOSIS — I10 ESSENTIAL HYPERTENSION: Primary | ICD-10-CM

## 2019-03-21 DIAGNOSIS — R22.42 LOWER LEG MASS, LEFT: ICD-10-CM

## 2019-03-21 LAB — GLUCOSE BLD-MCNC: 128 MG/DL

## 2019-03-21 PROCEDURE — G8428 CUR MEDS NOT DOCUMENT: HCPCS | Performed by: INTERNAL MEDICINE

## 2019-03-21 PROCEDURE — 1036F TOBACCO NON-USER: CPT | Performed by: INTERNAL MEDICINE

## 2019-03-21 PROCEDURE — 4040F PNEUMOC VAC/ADMIN/RCVD: CPT | Performed by: INTERNAL MEDICINE

## 2019-03-21 PROCEDURE — 3017F COLORECTAL CA SCREEN DOC REV: CPT | Performed by: INTERNAL MEDICINE

## 2019-03-21 PROCEDURE — 1101F PT FALLS ASSESS-DOCD LE1/YR: CPT | Performed by: INTERNAL MEDICINE

## 2019-03-21 PROCEDURE — 99213 OFFICE O/P EST LOW 20 MIN: CPT | Performed by: INTERNAL MEDICINE

## 2019-03-21 PROCEDURE — 82962 GLUCOSE BLOOD TEST: CPT | Performed by: INTERNAL MEDICINE

## 2019-03-21 PROCEDURE — G8427 DOCREV CUR MEDS BY ELIG CLIN: HCPCS | Performed by: INTERNAL MEDICINE

## 2019-03-21 PROCEDURE — G8417 CALC BMI ABV UP PARAM F/U: HCPCS | Performed by: INTERNAL MEDICINE

## 2019-03-21 PROCEDURE — 1123F ACP DISCUSS/DSCN MKR DOCD: CPT | Performed by: INTERNAL MEDICINE

## 2019-03-21 PROCEDURE — G8484 FLU IMMUNIZE NO ADMIN: HCPCS | Performed by: INTERNAL MEDICINE

## 2019-03-21 PROCEDURE — 3045F PR MOST RECENT HEMOGLOBIN A1C LEVEL 7.0-9.0%: CPT | Performed by: INTERNAL MEDICINE

## 2019-03-21 PROCEDURE — 73590 X-RAY EXAM OF LOWER LEG: CPT

## 2019-03-21 PROCEDURE — 2022F DILAT RTA XM EVC RTNOPTHY: CPT | Performed by: INTERNAL MEDICINE

## 2019-03-21 RX ORDER — LANCETS 30 GAUGE
EACH MISCELLANEOUS
Qty: 100 EACH | Refills: 6 | Status: SHIPPED | OUTPATIENT
Start: 2019-03-21 | End: 2019-12-26 | Stop reason: SDUPTHER

## 2019-03-21 RX ORDER — ATORVASTATIN CALCIUM 20 MG/1
TABLET, FILM COATED ORAL
Qty: 30 TABLET | Refills: 5 | Status: SHIPPED | OUTPATIENT
Start: 2019-03-21 | End: 2019-09-27 | Stop reason: SDUPTHER

## 2019-03-21 RX ORDER — AMLODIPINE BESYLATE AND BENAZEPRIL HYDROCHLORIDE 5; 20 MG/1; MG/1
CAPSULE ORAL
Qty: 30 CAPSULE | Refills: 5 | Status: SHIPPED | OUTPATIENT
Start: 2019-03-21 | End: 2019-09-27 | Stop reason: SDUPTHER

## 2019-03-21 ASSESSMENT — PATIENT HEALTH QUESTIONNAIRE - PHQ9
2. FEELING DOWN, DEPRESSED OR HOPELESS: 0
SUM OF ALL RESPONSES TO PHQ QUESTIONS 1-9: 0
1. LITTLE INTEREST OR PLEASURE IN DOING THINGS: 0
SUM OF ALL RESPONSES TO PHQ9 QUESTIONS 1 & 2: 0
SUM OF ALL RESPONSES TO PHQ QUESTIONS 1-9: 0

## 2019-03-21 ASSESSMENT — ENCOUNTER SYMPTOMS
CHEST TIGHTNESS: 0
TROUBLE SWALLOWING: 0
ORTHOPNEA: 0
SHORTNESS OF BREATH: 0
ABDOMINAL PAIN: 0
BLOOD IN STOOL: 0

## 2019-04-11 ENCOUNTER — ANESTHESIA (OUTPATIENT)
Dept: ENDOSCOPY | Age: 74
End: 2019-04-11
Payer: COMMERCIAL

## 2019-04-11 ENCOUNTER — HOSPITAL ENCOUNTER (OUTPATIENT)
Age: 74
Setting detail: OUTPATIENT SURGERY
Discharge: HOME | End: 2019-04-11
Attending: SPECIALIST | Admitting: SPECIALIST
Payer: COMMERCIAL

## 2019-04-11 ENCOUNTER — ANESTHESIA EVENT (OUTPATIENT)
Dept: ENDOSCOPY | Age: 74
End: 2019-04-11
Payer: COMMERCIAL

## 2019-04-11 VITALS
TEMPERATURE: 97.8 F | DIASTOLIC BLOOD PRESSURE: 89 MMHG | HEART RATE: 73 BPM | SYSTOLIC BLOOD PRESSURE: 137 MMHG | OXYGEN SATURATION: 97 % | WEIGHT: 193 LBS | BODY MASS INDEX: 30.29 KG/M2 | HEIGHT: 67 IN | RESPIRATION RATE: 22 BRPM

## 2019-04-11 VITALS
RESPIRATION RATE: 9 BRPM | OXYGEN SATURATION: 95 % | SYSTOLIC BLOOD PRESSURE: 127 MMHG | DIASTOLIC BLOOD PRESSURE: 76 MMHG

## 2019-04-11 PROCEDURE — 7100000010 HC PHASE II RECOVERY - FIRST 15 MIN: Performed by: SPECIALIST

## 2019-04-11 PROCEDURE — 6360000002 HC RX W HCPCS: Performed by: NURSE ANESTHETIST, CERTIFIED REGISTERED

## 2019-04-11 PROCEDURE — 88305 TISSUE EXAM BY PATHOLOGIST: CPT

## 2019-04-11 PROCEDURE — 3700000001 HC ADD 15 MINUTES (ANESTHESIA): Performed by: SPECIALIST

## 2019-04-11 PROCEDURE — 2580000003 HC RX 258: Performed by: SPECIALIST

## 2019-04-11 PROCEDURE — 2500000003 HC RX 250 WO HCPCS: Performed by: NURSE ANESTHETIST, CERTIFIED REGISTERED

## 2019-04-11 PROCEDURE — 3700000000 HC ANESTHESIA ATTENDED CARE: Performed by: SPECIALIST

## 2019-04-11 PROCEDURE — 45380 COLONOSCOPY AND BIOPSY: CPT | Performed by: SPECIALIST

## 2019-04-11 PROCEDURE — 3609027000 HC COLONOSCOPY: Performed by: SPECIALIST

## 2019-04-11 RX ORDER — SODIUM CHLORIDE 9 MG/ML
INJECTION, SOLUTION INTRAVENOUS CONTINUOUS
Status: DISCONTINUED | OUTPATIENT
Start: 2019-04-11 | End: 2019-04-11 | Stop reason: HOSPADM

## 2019-04-11 RX ORDER — SODIUM CHLORIDE 0.9 % (FLUSH) 0.9 %
10 SYRINGE (ML) INJECTION PRN
Status: DISCONTINUED | OUTPATIENT
Start: 2019-04-11 | End: 2019-04-11 | Stop reason: HOSPADM

## 2019-04-11 RX ORDER — LIDOCAINE HYDROCHLORIDE 10 MG/ML
1 INJECTION, SOLUTION EPIDURAL; INFILTRATION; INTRACAUDAL; PERINEURAL
Status: DISCONTINUED | OUTPATIENT
Start: 2019-04-11 | End: 2019-04-11 | Stop reason: HOSPADM

## 2019-04-11 RX ORDER — ONDANSETRON 2 MG/ML
4 INJECTION INTRAMUSCULAR; INTRAVENOUS
Status: DISCONTINUED | OUTPATIENT
Start: 2019-04-11 | End: 2019-04-11 | Stop reason: HOSPADM

## 2019-04-11 RX ORDER — SODIUM CHLORIDE 0.9 % (FLUSH) 0.9 %
10 SYRINGE (ML) INJECTION EVERY 12 HOURS SCHEDULED
Status: DISCONTINUED | OUTPATIENT
Start: 2019-04-11 | End: 2019-04-11 | Stop reason: HOSPADM

## 2019-04-11 RX ORDER — PROPOFOL 10 MG/ML
INJECTION, EMULSION INTRAVENOUS PRN
Status: DISCONTINUED | OUTPATIENT
Start: 2019-04-11 | End: 2019-04-11 | Stop reason: SDUPTHER

## 2019-04-11 RX ORDER — LIDOCAINE HYDROCHLORIDE 20 MG/ML
INJECTION, SOLUTION INFILTRATION; PERINEURAL PRN
Status: DISCONTINUED | OUTPATIENT
Start: 2019-04-11 | End: 2019-04-11 | Stop reason: SDUPTHER

## 2019-04-11 RX ADMIN — PROPOFOL 20 MG: 10 INJECTION, EMULSION INTRAVENOUS at 09:18

## 2019-04-11 RX ADMIN — PROPOFOL 10 MG: 10 INJECTION, EMULSION INTRAVENOUS at 09:30

## 2019-04-11 RX ADMIN — PROPOFOL 20 MG: 10 INJECTION, EMULSION INTRAVENOUS at 09:16

## 2019-04-11 RX ADMIN — PROPOFOL 20 MG: 10 INJECTION, EMULSION INTRAVENOUS at 09:14

## 2019-04-11 RX ADMIN — PROPOFOL 20 MG: 10 INJECTION, EMULSION INTRAVENOUS at 09:28

## 2019-04-11 RX ADMIN — SODIUM CHLORIDE: 9 INJECTION, SOLUTION INTRAVENOUS at 08:54

## 2019-04-11 RX ADMIN — PROPOFOL 80 MG: 10 INJECTION, EMULSION INTRAVENOUS at 09:13

## 2019-04-11 RX ADMIN — PROPOFOL 20 MG: 10 INJECTION, EMULSION INTRAVENOUS at 09:24

## 2019-04-11 RX ADMIN — LIDOCAINE HYDROCHLORIDE 40 MG: 20 INJECTION, SOLUTION INFILTRATION; PERINEURAL at 09:13

## 2019-04-11 RX ADMIN — PROPOFOL 20 MG: 10 INJECTION, EMULSION INTRAVENOUS at 09:20

## 2019-04-11 RX ADMIN — PROPOFOL 20 MG: 10 INJECTION, EMULSION INTRAVENOUS at 09:26

## 2019-04-11 RX ADMIN — PROPOFOL 20 MG: 10 INJECTION, EMULSION INTRAVENOUS at 09:22

## 2019-04-11 ASSESSMENT — PAIN - FUNCTIONAL ASSESSMENT: PAIN_FUNCTIONAL_ASSESSMENT: 0-10

## 2019-04-11 ASSESSMENT — PAIN SCALES - GENERAL
PAINLEVEL_OUTOF10: 0
PAINLEVEL_OUTOF10: 0

## 2019-04-11 NOTE — ANESTHESIA POSTPROCEDURE EVALUATION
Department of Anesthesiology  Postprocedure Note    Patient: Alethea Boyd  MRN: [id number]  YOB: 1945  Date of evaluation: 4/11/2019  Time:  9:33 AM     Procedure Summary     Date:  04/11/19 Room / Location:  [facility]  / [facility]    Anesthesia Start:  1028 Anesthesia Stop:      Procedure:  COLORECTAL CANCER SCREENING, NOT HIGH RISK (N/A ) Diagnosis:  ( - Screening)    Surgeon:  Meme Pham MD Responsible Provider:  ROSE Martinez CRNA    Anesthesia Type:  MAC ASA Status:  3          Anesthesia Type: MAC    Mariam Phase I: Mariam Score: 10    Mariam Phase II:      Last vitals: Reviewed and per EMR flowsheets.        Anesthesia Post Evaluation    Patient location during evaluation: bedside  Patient participation: complete - patient participated  Level of consciousness: awake and alert  Pain score: 1  Airway patency: patent  Nausea & Vomiting: no nausea and no vomiting  Complications: no  Cardiovascular status: hemodynamically stable  Respiratory status: acceptable and nasal cannula  Hydration status: euvolemic  Comments: Report to RN, normal sinus rhythm

## 2019-04-11 NOTE — ANESTHESIA PRE PROCEDURE
Department of Anesthesiology  Preprocedure Note       Name:  Yonatan Wheeler   Age:  68 y.o.  :  1945                                          MRN:  [id number]         Date:  2019      Surgeon: Carolyn Diaz):  Jaime Retana MD    Procedure: COLORECTAL CANCER SCREENING, NOT HIGH RISK (N/A )    Medications prior to admission:   Prior to Admission medications    Medication Sig Start Date End Date Taking?  Authorizing Provider   amLODIPine-benazepril (LOTREL) 5-20 MG per capsule take 1 capsule by mouth once daily 3/21/19  Yes Donnie Omer MD   atorvastatin (LIPITOR) 20 MG tablet take 1 tablet by mouth once daily 3/21/19  Yes Donnie Omer MD   metFORMIN (GLUCOPHAGE) 1000 MG tablet take 1 tablet by mouth twice a day with meals 3/21/19  Yes Donnie Omer MD   blood glucose test strips (ONE TOUCH ULTRA TEST) strip TEST once daily, DX: E11.65, IDDM 3/21/19  Yes Daisy Sebastian MD   Lancets MISC TEST once daily, DX: E11.65, IDDM 3/21/19  Yes Daisy Sebastian MD   sildenafil (VIAGRA) 100 MG tablet Take 1 tablet by mouth daily as needed for Erectile Dysfunction 19  Yes Shar Willoughby MD   JANUVIA 100 MG tablet take 1 tablet by mouth once daily 19  Yes Daisy Sebastian MD RA ASPIRIN EC 81 MG EC tablet take 1 tablet by mouth once daily 19  Yes Donnie Omer MD   cloNIDine (CATAPRES) 0.1 MG tablet take 1 tablet by mouth twice a day 19  Yes Donnie Omer MD   insulin glargine (LANTUS SOLOSTAR) 100 UNIT/ML injection pen 10 units at bedtime 18  Yes Daisy Sebastian MD   Insulin Pen Needle (B-D UF III MINI PEN NEEDLES) 31G X 5 MM MISC USE AS DIRECTED DAILY 18  Yes Daisy Sebastian MD   Alcohol Swabs (B-D SINGLE USE SWABS REGULAR) PADS use as directed once daily 18  Yes Daisy Sebastian MD   RA VITAMIN D-3 1000 units TABS tablet take 1 tablet by mouth once daily 12/10/18  Yes Donnie Omer MD   glimepiride (AMARYL) 4 MG tablet take 1 tablet by mouth twice a day with meals 12/10/18  Yes Donnie Omer MD Blood Glucose Monitoring Suppl (ONE TOUCH ULTRA MINI) w/Device KIT 1 kit by Does not apply route 3 times daily DX: E11.65, IDDM 7/26/18  Yes Edil Wu MD   ONE TOUCH ULTRASOFT LANCETS MISC TEST once daily 8/7/17  Yes Carri Mckeon MD   RA VITAMIN B-12 TR 1000 MCG TBCR take 1 tablet by mouth once daily 8/26/16  Yes Carri Mckeon MD       Current medications:    Current Facility-Administered Medications   Medication Dose Route Frequency Provider Last Rate Last Dose    0.9 % sodium chloride infusion   Intravenous Continuous Mu Lock  mL/hr at 04/11/19 0854      sodium chloride flush 0.9 % injection 10 mL  10 mL Intravenous 2 times per day Mu Lock MD        sodium chloride flush 0.9 % injection 10 mL  10 mL Intravenous PRN Mu Lock MD        lidocaine PF 1 % injection 1 mL  1 mL Intradermal Once PRN Mu Lock MD           Allergies:  No Known Allergies    Problem List:    Patient Active Problem List   Diagnosis Code    Essential hypertension, benign I10    Obesity (BMI 30-39. 9) E66.9    Hyperlipidemia with target LDL less than 100 E78.5    Low HDL (under 40) E78.6    Pes planus M21.40    Normocytic anemia D64.9    Erectile dysfunction associated with type 2 diabetes mellitus (HCC) E11.69, N52.1    Adenocarcinoma of prostate, stage 2 (HCC) C61    Knee pain M25.569    Anemia due to vitamin B12 deficiency D51.9    Hypercalcemia E83.52    Uncontrolled type 2 diabetes mellitus with microalbuminuria (HCC) E11.29, E11.65, R80.9    Prostate cancer ([facility] C61       Past Medical History:        Diagnosis Date    Adenocarcinoma of prostate, stage 2 ([facility] 2015    [place] 3+4, Dr Yimi Boyd  PSA 7.69 intermediate risk    Erectile dysfunction associated with type 2 diabetes mellitus ([facility]     Essential hypertension, benign     Low HDL (under 40)     Obesity (BMI 30-39. 9)     Pes planus of both feet 2015    Dr Eve Alvarez S/P colonoscopy with polypectomy 2014    Dr Alec Appiah 02/05/2019     02/05/2019    CO2 23 02/05/2019    BUN 19 02/05/2019    CREATININE 1.28 02/05/2019    GFRAA >60.0 02/05/2019    LABGLOM 55.2 02/05/2019    GLUCOSE 128 03/21/2019    GLUCOSE 95 04/11/2012    PROT 7.6 09/20/2018    CALCIUM 9.6 02/05/2019    BILITOT 0.5 09/20/2018    ALKPHOS 81 09/20/2018    AST 13 09/20/2018    ALT 14 09/20/2018       POC Tests: No results for input(s): POCGLU, POCNA, POCK, POCCL, POCBUN, POCHEMO, POCHCT in the last 72 hours. Coags:   Lab Results   Component Value Date    PROTIME 10.4 04/09/2012    INR 1.0 04/09/2012       HCG (If Applicable): No results found for: PREGTESTUR, PREGSERUM, HCG, HCGQUANT     ABGs: No results found for: PHART, PO2ART, KMU5FLY, PDC1CNR, BEART, A1FLSSOD     Type & Screen (If Applicable):  No results found for: LABABO, [address]    Anesthesia Evaluation  Patient summary reviewed and Nursing notes reviewed  Airway: Mallampati: II  TM distance: >3 FB   Neck ROM: full  Mouth opening: > = 3 FB Dental:          Pulmonary:Negative Pulmonary ROS and normal exam              Patient did not smoke on day of surgery. Cardiovascular:  Exercise tolerance: no interval change,   (+) hypertension:, hyperlipidemia      NYHA Classification: III                 Beta Blocker:  Not on Beta Blocker         Neuro/Psych:   Negative Neuro/Psych ROS              GI/Hepatic/Renal:   (+) bowel prep, morbid obesity          Endo/Other:    (+) DiabetesType II DM, , .          Pt had no PAT visit        ROS comment: anemia Abdominal:   (+) obese,         Vascular: negative vascular ROS. Anesthesia Plan      MAC     ASA 3             Anesthetic plan and risks discussed with patient. Plan discussed with attending.                   Marquis Paulino, APRN - CRNA   4/11/2019

## 2019-05-22 RX ORDER — GLIMEPIRIDE 4 MG/1
TABLET ORAL
Qty: 180 TABLET | Refills: 0 | Status: SHIPPED | OUTPATIENT
Start: 2019-05-22 | End: 2019-09-24 | Stop reason: SDUPTHER

## 2019-05-30 RX ORDER — AVOBENZONE, HOMOSALATE, OCTISALATE, OCTOCRYLENE 30; 100; 50; 25 MG/ML; MG/ML; MG/ML; MG/ML
SPRAY TOPICAL
Qty: 30 TABLET | Refills: 5 | Status: SHIPPED | OUTPATIENT
Start: 2019-05-30 | End: 2019-11-17 | Stop reason: SDUPTHER

## 2019-06-25 RX ORDER — ACETAMINOPHEN/DIPHENHYDRAMINE 500MG-25MG
TABLET ORAL
Qty: 30 TABLET | Refills: 5 | Status: SHIPPED | OUTPATIENT
Start: 2019-06-25 | End: 2019-12-27

## 2019-06-25 RX ORDER — CLONIDINE HYDROCHLORIDE 0.1 MG/1
TABLET ORAL
Qty: 60 TABLET | Refills: 5 | Status: SHIPPED | OUTPATIENT
Start: 2019-06-25 | End: 2019-12-27

## 2019-07-05 RX ORDER — DEXTROSE 4 G
TABLET,CHEWABLE ORAL
Qty: 100 EACH | Refills: 1 | Status: SHIPPED | OUTPATIENT
Start: 2019-07-05 | End: 2020-08-25 | Stop reason: SDUPTHER

## 2019-07-05 NOTE — TELEPHONE ENCOUNTER
Pharmacy is requesting medication refill.  Please approve or deny this request.    Rx requested:  Requested Prescriptions     Pending Prescriptions Disp Refills    RA ALCOHOL SWABS 70 % PADS [Pharmacy Med Name: RA ALCOHOL SWABS] 100 each 1     Sig: use as directed once daily         Last Office Visit:   3/21/2019      Next Visit Date:  Future Appointments   Date Time Provider Leonides Cohen   9/24/2019 12:00 PM Jim Miller MD [address]   9/24/2019  1:30 PM Rogerio Corona MD [facility]   2/11/2020  2:45 PM Rand Goddard MD [facility]

## 2019-07-29 RX ORDER — SITAGLIPTIN 100 MG/1
TABLET, FILM COATED ORAL
Qty: 30 TABLET | Refills: 5 | Status: SHIPPED | OUTPATIENT
Start: 2019-07-29 | End: 2020-01-16

## 2019-09-24 ENCOUNTER — OFFICE VISIT (OUTPATIENT)
Dept: ENDOCRINOLOGY | Age: 74
End: 2019-09-24
Payer: COMMERCIAL

## 2019-09-24 ENCOUNTER — OFFICE VISIT (OUTPATIENT)
Dept: INTERNAL MEDICINE CLINIC | Age: 74
End: 2019-09-24
Payer: COMMERCIAL

## 2019-09-24 VITALS
BODY MASS INDEX: 29.66 KG/M2 | HEIGHT: 67 IN | SYSTOLIC BLOOD PRESSURE: 136 MMHG | HEART RATE: 64 BPM | WEIGHT: 189 LBS | DIASTOLIC BLOOD PRESSURE: 87 MMHG

## 2019-09-24 VITALS
DIASTOLIC BLOOD PRESSURE: 77 MMHG | HEART RATE: 70 BPM | OXYGEN SATURATION: 99 % | BODY MASS INDEX: 29.63 KG/M2 | SYSTOLIC BLOOD PRESSURE: 138 MMHG | WEIGHT: 189.2 LBS

## 2019-09-24 DIAGNOSIS — R20.2 LEFT LEG PARESTHESIAS: ICD-10-CM

## 2019-09-24 DIAGNOSIS — B35.1 ONYCHOMYCOSIS: Primary | ICD-10-CM

## 2019-09-24 DIAGNOSIS — I10 ESSENTIAL HYPERTENSION: Primary | ICD-10-CM

## 2019-09-24 LAB
ANION GAP SERPL CALCULATED.3IONS-SCNC: 16 MEQ/L (ref 9–15)
BUN BLDV-MCNC: 10 MG/DL (ref 8–23)
CALCIUM SERPL-MCNC: 9 MG/DL (ref 8.5–9.9)
CHLORIDE BLD-SCNC: 100 MEQ/L (ref 95–107)
CHP ED QC CHECK: NORMAL
CO2: 23 MEQ/L (ref 20–31)
CREAT SERPL-MCNC: 1.16 MG/DL (ref 0.7–1.2)
CREATININE URINE: 219.4 MG/DL
GFR AFRICAN AMERICAN: >60
GFR NON-AFRICAN AMERICAN: >60
GLUCOSE BLD-MCNC: 144 MG/DL
GLUCOSE BLD-MCNC: 177 MG/DL (ref 70–99)
HBA1C MFR BLD: 7.6 % (ref 4.8–5.9)
MICROALBUMIN UR-MCNC: 1.8 MG/DL
MICROALBUMIN/CREAT UR-RTO: 8.2 MG/G (ref 0–30)
POTASSIUM SERPL-SCNC: 4.1 MEQ/L (ref 3.4–4.9)
SODIUM BLD-SCNC: 139 MEQ/L (ref 135–144)

## 2019-09-24 PROCEDURE — 3017F COLORECTAL CA SCREEN DOC REV: CPT | Performed by: INTERNAL MEDICINE

## 2019-09-24 PROCEDURE — 1123F ACP DISCUSS/DSCN MKR DOCD: CPT | Performed by: INTERNAL MEDICINE

## 2019-09-24 PROCEDURE — G8417 CALC BMI ABV UP PARAM F/U: HCPCS | Performed by: INTERNAL MEDICINE

## 2019-09-24 PROCEDURE — 4040F PNEUMOC VAC/ADMIN/RCVD: CPT | Performed by: INTERNAL MEDICINE

## 2019-09-24 PROCEDURE — G8427 DOCREV CUR MEDS BY ELIG CLIN: HCPCS | Performed by: INTERNAL MEDICINE

## 2019-09-24 PROCEDURE — 99213 OFFICE O/P EST LOW 20 MIN: CPT | Performed by: INTERNAL MEDICINE

## 2019-09-24 PROCEDURE — 1036F TOBACCO NON-USER: CPT | Performed by: INTERNAL MEDICINE

## 2019-09-24 PROCEDURE — 82962 GLUCOSE BLOOD TEST: CPT | Performed by: INTERNAL MEDICINE

## 2019-09-24 PROCEDURE — 3045F PR MOST RECENT HEMOGLOBIN A1C LEVEL 7.0-9.0%: CPT | Performed by: INTERNAL MEDICINE

## 2019-09-24 PROCEDURE — 2022F DILAT RTA XM EVC RTNOPTHY: CPT | Performed by: INTERNAL MEDICINE

## 2019-09-24 RX ORDER — IBUPROFEN 600 MG/1
TABLET ORAL
Refills: 0 | COMMUNITY
Start: 2019-07-17 | End: 2020-03-24

## 2019-09-24 RX ORDER — PREDNISONE 10 MG/1
TABLET ORAL
Refills: 0 | COMMUNITY
Start: 2019-08-17 | End: 2020-03-24

## 2019-09-24 RX ORDER — GLIMEPIRIDE 4 MG/1
TABLET ORAL
Qty: 60 TABLET | Refills: 5 | Status: SHIPPED | OUTPATIENT
Start: 2019-09-24 | End: 2020-03-24

## 2019-09-24 RX ORDER — GLIMEPIRIDE 4 MG/1
TABLET ORAL
Qty: 180 TABLET | Refills: 1 | Status: SHIPPED | OUTPATIENT
Start: 2019-09-24 | End: 2020-03-24

## 2019-09-24 RX ORDER — GLIMEPIRIDE 4 MG/1
TABLET ORAL
Qty: 180 TABLET | Refills: 0 | Status: SHIPPED | OUTPATIENT
Start: 2019-09-24 | End: 2020-03-24

## 2019-09-24 ASSESSMENT — ENCOUNTER SYMPTOMS
CHEST TIGHTNESS: 0
ABDOMINAL PAIN: 0
ORTHOPNEA: 0
SHORTNESS OF BREATH: 0
BLOOD IN STOOL: 0
EYE PAIN: 0
COUGH: 0
TROUBLE SWALLOWING: 0

## 2019-09-24 NOTE — PROGRESS NOTES
aching, cramping and burning. The pain is mild. The pain has been fluctuating since onset. Associated symptoms include numbness. Pertinent negatives include no inability to bear weight, loss of sensation, muscle weakness or tingling. Nothing aggravates the symptoms. He has tried nothing for the symptoms. More detail above in the chiefcomplaint(s), interim history and below in the review of systems. Past Medical History:   Diagnosis Date    Adenocarcinoma of prostate, stage 2 ([facility] 2015    [place] 3+4, Dr Lisset Beatty  PSA 7.69 intermediate risk    [facility]     Essential hypertension, benign     Low HDL (under 40)     Obesity (BMI 30-39. 9)     Pes planus of both feet 2015    Dr Aristeo Jaramillo S/P colonoscopy with polypectomy 2014, 2019    Dr Germania Thompson Uncontrolled type 2 diabetes mellitus with microalbuminuria [facility]) 2007    Dr Juan Diego Dee Unspecified deformity of ankle and foot, acquired     Vitamin B12 deficiency anemia 2016       Past Surgical History:   Procedure Laterality Date    COLONOSCOPY  03/20/2014    Polyp, Diverticulosis, Internal Hemorrhoids-Dr Sanders    COLONOSCOPY N/A 4/11/2019    COLORECTAL CANCER SCREENING, NOT HIGH RISK performed by Gautam Brewer MD at [place] [address].      Full upper/lower extractions    PROSTATE BIOPSY  1/2015    Dr Lisset Beatty       Social History     Socioeconomic History    Marital status: Single     Spouse name: Not on file    Number of children: 2    Years of education: Not on file    Highest education level: Not on file   Occupational History    Occupation: [id number], others, SSI   Social Needs    Financial resource strain: Not on file    Food insecurity:     Worry: Not on file     Inability: Not on file   [employer] needs:     Medical: Not on file     Non-medical: Not on file   Tobacco Use    Smoking status: Former Smoker     Packs/day: 0.50     Years: 53.00     Pack 0    JANUVIA 100 MG tablet take 1 tablet by mouth once daily 30 tablet 5    RA ALCOHOL SWABS 70 % PADS use as directed once daily 100 each 1    cloNIDine (CATAPRES) 0.1 MG tablet take 1 tablet by mouth twice a day 60 tablet 5    RA ASPIRIN EC 81 MG EC tablet take 1 tablet by mouth once daily 30 tablet 5    RA VITAMIN D-3 1000 units TABS tablet take 1 tablet by mouth once daily 30 tablet 5    amLODIPine-benazepril (LOTREL) 5-20 MG per capsule take 1 capsule by mouth once daily 30 capsule 5    atorvastatin (LIPITOR) 20 MG tablet take 1 tablet by mouth once daily 30 tablet 5    metFORMIN (GLUCOPHAGE) 1000 MG tablet take 1 tablet by mouth twice a day with meals 60 tablet 5    Lancets MISC TEST once daily, DX: E11.65, IDDM 100 each 6    sildenafil (VIAGRA) 100 MG tablet Take 1 tablet by mouth daily as needed for Erectile Dysfunction 5 tablet 11    insulin glargine (LANTUS SOLOSTAR) 100 UNIT/ML injection pen 10 units at bedtime 5 pen 3    Insulin Pen Needle (B-D UF III MINI PEN NEEDLES) 31G X 5 MM MISC USE AS DIRECTED DAILY 100 each 3    Blood Glucose Monitoring Suppl (ONE TOUCH ULTRA MINI) w/Device KIT 1 kit by Does not apply route 3 times daily DX: E11.65, IDDM 1 kit 0    ONE TOUCH ULTRASOFT LANCETS MISC TEST once daily 100 each 3    RA VITAMIN B-12 TR 1000 MCG TBCR take 1 tablet by mouth once daily 30 tablet 5     No current facility-administered medications on file prior to visit. Review of Systems   Constitutional: Negative for activity change, appetite change, fatigue and unexpected weight change. HENT: Negative for congestion, nosebleeds and trouble swallowing. Eyes: Negative for pain and visual disturbance. Respiratory: Negative for cough, chest tightness and shortness of breath. Cardiovascular: Negative for chest pain, palpitations, orthopnea, leg swelling and PND. Gastrointestinal: Negative for abdominal pain and blood in stool.    Endocrine: Negative for cold intolerance, heat intolerance, polydipsia and polyuria. Genitourinary: Negative for dysuria, flank pain and hematuria. Musculoskeletal: Positive for arthralgias (knees). Negative for gait problem, myalgias and neck pain. Skin: Negative for rash. Neurological: Positive for numbness. Negative for dizziness, tingling, speech difficulty, weakness, light-headedness and headaches. Psychiatric/Behavioral: Positive for decreased concentration. Negative for confusion, dysphoric mood and sleep disturbance. The patient is nervous/anxious. Vitals:    09/24/19 1243 09/24/19 1246   BP: (!) 140/74 138/77   Site: Left Upper Arm Left Upper Arm   Position: Sitting Sitting   Cuff Size: Large Adult Large Adult   Pulse: 70    SpO2: 99%    Weight: 189 lb 3.2 oz (85.8 kg)        Physical Exam   Constitutional: He is oriented to person, place, and time. No distress. Overweight, age appropriate  Grooming fair    HENT:   Head: Atraumatic. Eyes: Conjunctivae are normal.   Neck: Neck supple. No JVD present. No thyromegaly present. Cardiovascular: Regular rhythm, normal heart sounds and intact distal pulses. Exam reveals no gallop. Pulses:       Radial pulses are 1+ on the right side, and 2+ on the left side. Posterior tibial pulses are 1+ on the right side, and 1+ on the left side. Pulmonary/Chest: Effort normal and breath sounds normal. He has no rales. Abdominal: Soft. He exhibits no distension. Exam limited due to abdominal adiposity      Musculoskeletal: Normal range of motion. Right knee: He exhibits no swelling. Left knee: He exhibits no swelling. Right ankle: He exhibits no swelling. Left ankle: He exhibits no swelling. Neurological: He is alert and oriented to person, place, and time. He has normal strength. Coordination normal.   Reflex Scores:       Patellar reflexes are 2+ on the right side and 2+ on the left side. Skin: Skin is warm and dry. No rash noted.    Psychiatric: His

## 2019-09-27 RX ORDER — AMLODIPINE BESYLATE AND BENAZEPRIL HYDROCHLORIDE 5; 20 MG/1; MG/1
CAPSULE ORAL
Qty: 30 CAPSULE | Refills: 5 | Status: SHIPPED | OUTPATIENT
Start: 2019-09-27 | End: 2020-02-24

## 2019-09-27 RX ORDER — ATORVASTATIN CALCIUM 20 MG/1
TABLET, FILM COATED ORAL
Qty: 30 TABLET | Refills: 5 | Status: SHIPPED | OUTPATIENT
Start: 2019-09-27 | End: 2020-02-24

## 2019-09-27 NOTE — TELEPHONE ENCOUNTER
Pharmacy  requesting medication refill.  Please approve or deny this request.    Rx requested:  Requested Prescriptions     Pending Prescriptions Disp Refills    atorvastatin (LIPITOR) 20 MG tablet [Pharmacy Med Name: ATORVASTATIN 20 MG TABLET] 30 tablet 5     Sig: take 1 tablet by mouth once daily    amLODIPine-benazepril (LOTREL) 5-20 MG per capsule [Pharmacy Med Name: AMLODIPINE-BENAZEPRIL 5-20 MG] 30 capsule 5     Sig: take 1 capsule by mouth once daily         Last Office Visit:   9/24/2019      Next Visit Date:  Future Appointments   Date Time Provider Leonides Cohen   2/11/2020  2:45 PM Anthony Houston MD [facility]   3/24/2020 12:00 PM Mayi Garcia MD [facility]   3/24/2020  1:30 PM Ba Nelson MD [facility]

## 2019-09-30 NOTE — PROGRESS NOTES
[facility]    Adenomatous polyp of descending colon     No Known Allergies    Current Outpatient Medications:     blood glucose test strips (ONE TOUCH ULTRA TEST) strip, Test tid, Disp: 100 strip, Rfl: 2    atorvastatin (LIPITOR) 20 MG tablet, take 1 tablet by mouth once daily, Disp: 30 tablet, Rfl: 5    amLODIPine-benazepril (LOTREL) 5-20 MG per capsule, take 1 capsule by mouth once daily, Disp: 30 capsule, Rfl: 5    glimepiride (AMARYL) 4 MG tablet, take 1 tablet twice a day with meals, Disp: 180 tablet, Rfl: 0    glimepiride (AMARYL) 4 MG tablet, take 1 tablet twice a day with meals, Disp: 60 tablet, Rfl: 5    hydrocortisone 2.5 % cream, apply to affected area twice a day (DO NOT APPLY TO FACE), Disp: , Rfl: 0    ibuprofen (ADVIL;MOTRIN) 600 MG tablet, take 1 tablet by mouth four times a day if needed, Disp: , Rfl: 0    predniSONE (DELTASONE) 10 MG tablet, take 4 tablets once daily for 3 days then 3 for 3 days then 2 for 3 days then 1 tablet for 3 days, Disp: , Rfl: 0    glimepiride (AMARYL) 4 MG tablet, take 1 tablet twice a day with meals, Disp: 180 tablet, Rfl: 1    JANUVIA 100 MG tablet, take 1 tablet by mouth once daily, Disp: 30 tablet, Rfl: 5    RA ALCOHOL SWABS 70 % PADS, use as directed once daily, Disp: 100 each, Rfl: 1    cloNIDine (CATAPRES) 0.1 MG tablet, take 1 tablet by mouth twice a day, Disp: 60 tablet, Rfl: 5    RA ASPIRIN EC 81 MG EC tablet, take 1 tablet by mouth once daily, Disp: 30 tablet, Rfl: 5    RA VITAMIN D-3 1000 units TABS tablet, take 1 tablet by mouth once daily, Disp: 30 tablet, Rfl: 5    metFORMIN (GLUCOPHAGE) 1000 MG tablet, take 1 tablet by mouth twice a day with meals, Disp: 60 tablet, Rfl: 5    Lancets MISC, TEST once daily, DX: E11.65, IDDM, Disp: 100 each, Rfl: 6    sildenafil (VIAGRA) 100 MG tablet, Take 1 tablet by mouth daily as needed for Erectile Dysfunction, Disp: 5 tablet, Rfl: 11    insulin glargine (LANTUS SOLOSTAR) 100 UNIT/ML

## 2019-12-26 RX ORDER — LANCETS 30 GAUGE
1 EACH MISCELLANEOUS 3 TIMES DAILY
Qty: 100 EACH | Refills: 6 | Status: SHIPPED | OUTPATIENT
Start: 2019-12-26 | End: 2020-08-25 | Stop reason: SDUPTHER

## 2019-12-27 RX ORDER — ACETAMINOPHEN/DIPHENHYDRAMINE 500MG-25MG
TABLET ORAL
Qty: 90 TABLET | Refills: 3 | Status: SHIPPED | OUTPATIENT
Start: 2019-12-27 | End: 2020-08-25 | Stop reason: SDUPTHER

## 2019-12-27 RX ORDER — CLONIDINE HYDROCHLORIDE 0.1 MG/1
TABLET ORAL
Qty: 180 TABLET | Refills: 1 | Status: SHIPPED | OUTPATIENT
Start: 2019-12-27 | End: 2020-03-24

## 2020-01-16 RX ORDER — SITAGLIPTIN 100 MG/1
TABLET, FILM COATED ORAL
Qty: 90 TABLET | Refills: 3 | Status: SHIPPED | OUTPATIENT
Start: 2020-01-16 | End: 2020-08-25 | Stop reason: SDUPTHER

## 2020-02-05 DIAGNOSIS — C61 ADENOCARCINOMA OF PROSTATE, STAGE 2 (HCC): ICD-10-CM

## 2020-02-05 LAB
ANION GAP SERPL CALCULATED.3IONS-SCNC: 15 MEQ/L (ref 9–15)
BUN BLDV-MCNC: 14 MG/DL (ref 8–23)
CALCIUM SERPL-MCNC: 9.5 MG/DL (ref 8.5–9.9)
CHLORIDE BLD-SCNC: 104 MEQ/L (ref 95–107)
CO2: 24 MEQ/L (ref 20–31)
CREAT SERPL-MCNC: 1.24 MG/DL (ref 0.7–1.2)
CREATININE URINE: 173.7 MG/DL
GFR AFRICAN AMERICAN: >60
GFR NON-AFRICAN AMERICAN: 57.1
GLUCOSE BLD-MCNC: 100 MG/DL (ref 70–99)
HBA1C MFR BLD: 7.8 % (ref 4.8–5.9)
MICROALBUMIN UR-MCNC: 1.5 MG/DL
MICROALBUMIN/CREAT UR-RTO: 8.6 MG/G (ref 0–30)
POTASSIUM SERPL-SCNC: 4.4 MEQ/L (ref 3.4–4.9)
PROSTATE SPECIFIC ANTIGEN: 0.24 NG/ML (ref 0–6.22)
SODIUM BLD-SCNC: 143 MEQ/L (ref 135–144)

## 2020-02-11 ENCOUNTER — OFFICE VISIT (OUTPATIENT)
Dept: UROLOGY | Age: 75
End: 2020-02-11
Payer: COMMERCIAL

## 2020-02-11 VITALS
DIASTOLIC BLOOD PRESSURE: 78 MMHG | BODY MASS INDEX: 27.99 KG/M2 | WEIGHT: 189 LBS | HEIGHT: 69 IN | SYSTOLIC BLOOD PRESSURE: 110 MMHG | OXYGEN SATURATION: 98 % | HEART RATE: 67 BPM

## 2020-02-11 PROCEDURE — 4040F PNEUMOC VAC/ADMIN/RCVD: CPT | Performed by: UROLOGY

## 2020-02-11 PROCEDURE — 1036F TOBACCO NON-USER: CPT | Performed by: UROLOGY

## 2020-02-11 PROCEDURE — 1123F ACP DISCUSS/DSCN MKR DOCD: CPT | Performed by: UROLOGY

## 2020-02-11 PROCEDURE — G8417 CALC BMI ABV UP PARAM F/U: HCPCS | Performed by: UROLOGY

## 2020-02-11 PROCEDURE — 99213 OFFICE O/P EST LOW 20 MIN: CPT | Performed by: UROLOGY

## 2020-02-11 PROCEDURE — G8484 FLU IMMUNIZE NO ADMIN: HCPCS | Performed by: UROLOGY

## 2020-02-11 PROCEDURE — G8427 DOCREV CUR MEDS BY ELIG CLIN: HCPCS | Performed by: UROLOGY

## 2020-02-11 PROCEDURE — 3017F COLORECTAL CA SCREEN DOC REV: CPT | Performed by: UROLOGY

## 2020-02-11 NOTE — PROGRESS NOTES
[facility] EVALUATION NOTE                                                 H&P                                                                                                                                                 Reason for Visit  Prostate cancer    History of Present Illness  80-year-old male diagnosed with prostate cancer in 2015  [place] score 7 perineural invasion  PSA 7.96 at time of biopsy  Patient patient received 2 years of neoadjuvant adjuvant hormonal therapy followed by radiation therapy  Current PSA under good control under 0.3        Urologic Review of Systems/Symptoms  Denies hematuria  Denies dysuria  Denies incontinence  Denies flank pain  Other Urologic: No change in voiding pattern    Review of Systems  Head and neck: No issues/reviewed  Cardiac: No recent issues/reviewed  Pulmonary: No issues/reviewed  Gastrointestinal: No issues/reviewed  Neurologic: No recent issues/reviewed  Extremities: No issues/reviewed  Lymphatics: No lymphadenopathy no change  Genitourinary: See above  Skin: No issues/reviewed  Hospitalization: None recent  Meds reviewed  All 14 categories of Review of Systems otherwise reviewed no other findings reported. Past Medical History:   Diagnosis Date    Adenocarcinoma of prostate, stage 2 ([facility] 2015    [place] 3+4, Dr Ashu Charlton  PSA 7.69 intermediate risk    Erectile dysfunction associated with type 2 diabetes mellitus ([facility]     Essential hypertension, benign     Intellectual disability     Low HDL (under 40)     Obesity (BMI 30-39. 9)     Pes planus of both feet 2015    Dr Edilberto Marques S/P colonoscopy with polypectomy 2014, 2019    Dr Rosanna Chavez Uncontrolled type 2 diabetes mellitus with microalbuminuria [facility]) 2007    Dr Mp Wei Unspecified deformity of ankle and foot, acquired     Vitamin B12 deficiency anemia 2016     Past Surgical History:   Procedure Laterality Date    COLONOSCOPY  03/20/2014    Polyp, Diverticulosis, Internal Hemorrhoids- [de-identified]    Cancer Brother         liver, dec age 79    Diabetes Father     Cancer Father         prostate     Current Outpatient Medications   Medication Sig Dispense Refill    JANUVIA 100 MG tablet take 1 tablet by mouth once daily 90 tablet 3    RA ASPIRIN EC 81 MG EC tablet take 1 tablet by mouth once daily 90 tablet 3    cloNIDine (CATAPRES) 0.1 MG tablet take 1 tablet by mouth twice a day 180 tablet 1    insulin glargine (BASAGLAR KWIKPEN) 100 UNIT/ML injection pen inject 10 units subcutaneously at bedtime DO NOT USE SAME PEN FOR MORE THAN 28 DAYS 15 mL 3    blood glucose test strips (ONE TOUCH ULTRA TEST) strip TEST three times a day 100 strip 3    Lancets MISC 1 each by In Vitro route 3 times daily DX: E11.65, IDDM (please dispense covered brand) 100 each 6    metFORMIN (GLUCOPHAGE) 1000 MG tablet take 1 tablet by mouth twice a day with meals 180 tablet 0    RA VITAMIN D-3 25 MCG (1000 UT) TABS tablet take 1 tablet by mouth once daily 30 tablet 5    atorvastatin (LIPITOR) 20 MG tablet take 1 tablet by mouth once daily 30 tablet 5    amLODIPine-benazepril (LOTREL) 5-20 MG per capsule take 1 capsule by mouth once daily 30 capsule 5    glimepiride (AMARYL) 4 MG tablet take 1 tablet twice a day with meals 180 tablet 0    glimepiride (AMARYL) 4 MG tablet take 1 tablet twice a day with meals 60 tablet 5    hydrocortisone 2.5 % cream apply to affected area twice a day (DO NOT APPLY TO FACE)  0    ibuprofen (ADVIL;MOTRIN) 600 MG tablet take 1 tablet by mouth four times a day if needed  0    predniSONE (DELTASONE) 10 MG tablet take 4 tablets once daily for 3 days then 3 for 3 days then 2 for 3 days then 1 tablet for 3 days  0    glimepiride (AMARYL) 4 MG tablet take 1 tablet twice a day with meals 180 tablet 1    RA ALCOHOL SWABS 70 % PADS use as directed once daily 100 each 1    sildenafil (VIAGRA) 100 MG tablet Take 1 tablet by mouth daily as needed for Erectile Dysfunction 5 tablet 11    Insulin Pen Needle (B-D UF III MINI PEN NEEDLES) 31G X 5 MM MISC USE AS DIRECTED DAILY 100 each 3    Blood Glucose Monitoring Suppl (ONE TOUCH ULTRA MINI) w/Device KIT 1 kit by Does not apply route 3 times daily DX: E11.65, IDDM 1 kit 0    ONE TOUCH ULTRASOFT LANCETS MISC TEST once daily 100 each 3    RA VITAMIN B-12 TR 1000 MCG TBCR take 1 tablet by mouth once daily 30 tablet 5     No current facility-administered medications for this visit. Patient has no known allergies. All reviewed and verified by Dr Abner Castellano on today's visit    PSA   Date Value Ref Range Status   02/05/2020 0.24 0.00 - 6.22 ng/mL Final   02/05/2019 0.22 0.00 - 6.22 ng/mL Final   07/24/2018 0.28 0.00 - 6.22 ng/mL Final   02/12/2018 0.21 0.00 - 6.22 ng/mL Final   06/02/2017 0.14 0.00 - 6.22 ng/mL Final     Diagnostic Psa   Date Value Ref Range Status   12/04/2014 7.69 (H) 0.00 - 5.40 ng/mL Final   07/15/2014 6.31 (H) 0.00 - 5.40 ng/mL Final     No results found for this visit on 02/11/20. Physical Exam  Vitals:    02/11/20 1340   BP: 110/78   Pulse: 67   SpO2: 98%   Weight: 189 lb (85.7 kg)   Height: 5' 9\" (1.753 m)     Constitutional: patient is oriented to person, place, and time. patient appears well-developed. Not in distress. Ears: Adequate hearing/no hearing loss  Head: Normocephalic. Atraumatic  Neck: Normal range of motion. Cardiovascular: Normal rate, BP reviewed. Normal rhythm  Pulmonary/Chest: Normal respiratory effort No wheezing  Abdominal: Not distended. Denies abdominal pain  Urologic Exam  Neurologic exam unchanged. PSAs reviewed. .  Musculoskeletal: Normal range of motion. Normal.  Extremities: No edema  Neurological: Intact   Skin: Skin is warm and dry. No lesions. No rashes   Psychiatric: Alert oriented x3.   Assessment/Medical Necessity-Decision Making  Recent score 3+ for all biopsies biopsied at 7.96 received neoadjuvant hormonal therapy and adjuvant hormonal therapy for 2 years followed by radiation therapy PSA is 0.24  Plan  PSA 6 months notify patient with result  PSA 1 year with follow-up  Greater than 50% of 20 minutes spent consulting patient face-to-face  Orders Placed This Encounter   Procedures    PSA, Diagnostic     Standing Status:   Future     Standing Expiration Date:   2/10/2021    PSA, Diagnostic     Standing Status:   Future     Standing Expiration Date:   2/10/2021     No orders of the defined types were placed in this encounter. Margarito Murray MD       Please note this report has been partially produced using speech recognition software  And may cause contain errors related to that system including grammar, punctuation and spelling as well as words and phrases that may seem inappropriate. If there are questions or concerns please feel free to contact me to clarify.

## 2020-02-24 RX ORDER — ATORVASTATIN CALCIUM 20 MG/1
TABLET, FILM COATED ORAL
Qty: 30 TABLET | Refills: 5 | Status: SHIPPED | OUTPATIENT
Start: 2020-02-24 | End: 2020-03-20

## 2020-02-24 RX ORDER — AMLODIPINE BESYLATE AND BENAZEPRIL HYDROCHLORIDE 5; 20 MG/1; MG/1
CAPSULE ORAL
Qty: 30 CAPSULE | Refills: 5 | Status: SHIPPED | OUTPATIENT
Start: 2020-02-24 | End: 2020-08-25 | Stop reason: SDUPTHER

## 2020-02-24 NOTE — TELEPHONE ENCOUNTER
pharmacy requesting medication refill.  Please approve or deny this request.    Rx requested:  Requested Prescriptions     Pending Prescriptions Disp Refills    atorvastatin (LIPITOR) 20 MG tablet [Pharmacy Med Name: ATORVASTATIN 20 MG TABLET] 30 tablet 5     Sig: take 1 tablet by mouth once daily    amLODIPine-benazepril (LOTREL) 5-20 MG per capsule [Pharmacy Med Name: AMLODIPINE-BENAZEPRIL 5-20 MG] 30 capsule 5     Sig: take 1 capsule by mouth once daily         Last Office Visit:   9/24/2019      Next Visit Date:  Future Appointments   Date Time Provider Leonides Cohen   3/24/2020 12:00 PM Marquita Jc MD [address]   3/24/2020  1:30 PM Jasvir Fletcher MD [address] [place] [address]   2/16/2021  2:45 PM Rudy Ladd MD [facility]

## 2020-03-20 RX ORDER — ATORVASTATIN CALCIUM 20 MG/1
TABLET, FILM COATED ORAL
Qty: 30 TABLET | Refills: 5 | Status: SHIPPED | OUTPATIENT
Start: 2020-03-20 | End: 2020-08-25 | Stop reason: SDUPTHER

## 2020-03-23 PROBLEM — Z85.46 HISTORY OF PROSTATE CANCER: Status: ACTIVE | Noted: 2020-03-23

## 2020-03-23 PROBLEM — C61 PROSTATE CANCER (HCC): Status: RESOLVED | Noted: 2018-02-16 | Resolved: 2020-03-23

## 2020-03-24 ENCOUNTER — VIRTUAL VISIT (OUTPATIENT)
Dept: ENDOCRINOLOGY | Age: 75
End: 2020-03-24
Payer: COMMERCIAL

## 2020-03-24 ENCOUNTER — VIRTUAL VISIT (OUTPATIENT)
Dept: INTERNAL MEDICINE CLINIC | Age: 75
End: 2020-03-24
Payer: COMMERCIAL

## 2020-03-24 PROCEDURE — 99213 OFFICE O/P EST LOW 20 MIN: CPT | Performed by: INTERNAL MEDICINE

## 2020-03-24 RX ORDER — GLIMEPIRIDE 4 MG/1
TABLET ORAL
Qty: 180 TABLET | Refills: 0 | Status: SHIPPED | OUTPATIENT
Start: 2020-03-24 | End: 2020-06-15

## 2020-03-24 RX ORDER — CLONIDINE HYDROCHLORIDE 0.1 MG/1
TABLET ORAL
Qty: 180 TABLET | Refills: 1 | Status: SHIPPED | OUTPATIENT
Start: 2020-03-24 | End: 2020-06-15

## 2020-03-24 RX ORDER — PSYLLIUM HUSK 3.4 G/7G
POWDER ORAL
Qty: 90 TABLET | Refills: 2 | Status: SHIPPED | OUTPATIENT
Start: 2020-03-24 | End: 2021-02-25 | Stop reason: SDUPTHER

## 2020-03-24 RX ORDER — ACETAMINOPHEN 500 MG
500 TABLET ORAL 4 TIMES DAILY PRN
Qty: 120 TABLET | Refills: 2 | Status: SHIPPED | OUTPATIENT
Start: 2020-03-24

## 2020-03-24 ASSESSMENT — ENCOUNTER SYMPTOMS
CONSTIPATION: 0
BACK PAIN: 1
COUGH: 0
SHORTNESS OF BREATH: 0
TROUBLE SWALLOWING: 0
ABDOMINAL PAIN: 0
CHEST TIGHTNESS: 0
ORTHOPNEA: 0
EYE PAIN: 0
BLOOD IN STOOL: 0
BOWEL INCONTINENCE: 0

## 2020-03-24 NOTE — TELEPHONE ENCOUNTER
Pharmacy requesting medication refill.  Please approve or deny this request.    Rx requested:  Requested Prescriptions     Pending Prescriptions Disp Refills    cloNIDine (CATAPRES) 0.1 MG tablet [Pharmacy Med Name: CLONIDINE HCL 0.1 MG TABLET] 180 tablet 1     Sig: take 1 tablet by mouth twice a day         Last Office Visit:   9/24/2019      Next Visit Date:  Future Appointments   Date Time Provider Leonides Cohen   3/24/2020 12:00 PM Marquita Jc MD [address]   3/24/2020  1:30 PM Jasvir Fletcher MD [address] [place] [address]   2/16/2021  2:45 PM Rudy Ladd MD [facility]

## 2020-03-24 NOTE — PROGRESS NOTES
3/24/2020    TELEHEALTH EVALUATION -- Audio/Visual (During ARYHD-17 public health emergency)    Due to [place] 19 outbreak, patient's office visit was converted to a virtual visit. Patient was contacted and agreed to proceed with a virtual visit via Telephone Visit  The risks and benefits of converting to a virtual visit were discussed in light of the current infectious disease epidemic. Patient also understood that insurance coverage and co-pays are up to their individual insurance plans. HPI:    Gillian Whitehead (:  1945) has requested an audio/video evaluation for the following concern(s):    Patient here for diabetes follow-up currently on Lantus +3 oral agents blood sugars in the 1-200 range creatinine slightly high which is fluctuated over the last few years no active symptoms testing blood sugar 1-2 times daily no hypoglycemia did have recent eye exam done got glasses no diabetic retinopathy as per patient last A1c was 7.8 up from 7.6        Results for Radha Reddy (MRN [id number]) as of 3/24/2020 13:43   Ref.  Range 2020 17:06 2020 17:11 2020 17:13   Sodium Latest Ref Range: 135 - 144 mEq/L 143     Potassium Latest Ref Range: 3.4 - 4.9 mEq/L 4.4     Chloride Latest Ref Range: 95 - 107 mEq/L 104     CO2 Latest Ref Range: 20 - 31 mEq/L 24     BUN Latest Ref Range: 8 - 23 mg/dL 14     Creatinine Latest Ref Range: 0.70 - 1.20 mg/dL 1.24 (H)     Anion Gap Latest Ref Range: 9 - 15 mEq/L 15     GFR Non- Latest Ref Range: >60  57.1 (L)     GFR  Latest Ref Range: >60  >60.0     Glucose Latest Ref Range: 70 - 99 mg/dL 100 (H)     Calcium Latest Ref Range: 8.5 - 9.9 mg/dL 9.5     Hemoglobin A1C Latest Ref Range: 4.8 - 5.9 % 7.8 (H)     Prostatic Specific Ag Latest Ref Range: 0.00 - 6.22 ng/mL   0.24   Creatinine, Ur Latest Ref Range: Not Established mg/dL  173.7    Microalbumin Creatinine Ratio Latest Ref Range: 0.0 - 30.0 mg/G  8.6    Microalbumin, Random Urine Latest Ref Range: Not Established mg/dL  1.50        Review of Systems    Prior to Visit Medications    Medication Sig Taking?  Authorizing Provider   cloNIDine (CATAPRES) 0.1 MG tablet take 1 tablet by mouth twice a day  Junie Cage MD   atorvastatin (LIPITOR) 20 MG tablet take 1 tablet by mouth once daily  Junie Cage MD   amLODIPine-benazepril (LOTREL) 5-20 MG per capsule take 1 capsule by mouth once daily  Junie Cage MD   JANUVIA 100 MG tablet take 1 tablet by mouth once daily  Aroldo Smith MD RA ASPIRIN EC 81 MG EC tablet take 1 tablet by mouth once daily  Junie Cage MD   insulin glargine (BASAGLAR KWIKPEN) 100 UNIT/ML injection pen inject 10 units subcutaneously at bedtime DO NOT USE SAME PEN FOR MORE THAN 28 DAYS  Aroldo Smith MD   blood glucose test strips (ONE TOUCH ULTRA TEST) strip TEST three times a day  Aroldo Smith MD   Lancets MISC 1 each by In Vitro route 3 times daily DX: E11.65, IDDM (please dispense covered brand)  Aroldo Smith MD   metFORMIN (GLUCOPHAGE) 1000 MG tablet take 1 tablet by mouth twice a day with meals  Junie Cage MD RA VITAMIN D-3 25 MCG (1000 UT) TABS tablet take 1 tablet by mouth once daily  Junie Cage MD   glimepiride (AMARYL) 4 MG tablet take 1 tablet twice a day with meals  Aroldo Smith MD   glimepiride (AMARYL) 4 MG tablet take 1 tablet twice a day with meals  Aroldo Smith MD   hydrocortisone 2.5 % cream apply to affected area twice a day (DO NOT APPLY TO FACE)  Historical Provider, MD   glimepiride (AMARYL) 4 MG tablet take 1 tablet twice a day with meals  Ny Rueda MD RA ALCOHOL SWABS 70 % PADS use as directed once daily  Junie Cage MD   sildenafil (VIAGRA) 100 MG tablet Take 1 tablet by mouth daily as needed for Erectile Dysfunction  Osvaldo Martinez MD   Insulin Pen Needle (B-D UF III MINI PEN NEEDLES) 31G X 5 MM MISC USE AS DIRECTED DAILY  Aroldo Smith MD   Blood Glucose Monitoring Suppl (ONE TOUCH ULTRA MINI) w/Device KIT 1 kit by Does

## 2020-03-24 NOTE — PROGRESS NOTES
hypertension include NSAIDs. Risk factors for coronary artery disease include diabetes mellitus, sedentary lifestyle and male gender. Past treatments include ACE inhibitors, calcium channel blockers and central alpha agonists. The current treatment provides moderate improvement. Compliance problems include diet and medication cost.  There is no history of angina, CAD/MI, CVA or PVD. There is no history of renovascular disease, sleep apnea or a thyroid problem. Back Pain   This is a chronic problem. The current episode started more than 1 month ago. The problem is unchanged. The pain is present in the lumbar spine and sacro-iliac. The quality of the pain is described as aching and cramping. The pain radiates to the left thigh. The pain is moderate. The pain is worse during the day. The symptoms are aggravated by standing and bending. Associated symptoms include leg pain. Pertinent negatives include no abdominal pain, bladder incontinence, bowel incontinence, chest pain, dysuria, headaches, weakness or weight loss. Risk factors include history of cancer and lack of exercise. He has tried NSAIDs for the symptoms. The treatment provided mild relief. Anemia  Patient presents for evaluation of anemia. Anemia was found by routine CBC. It has been present for 2 years. Associated signs & symptoms: none. Review of Systems   Constitutional: Negative for activity change, appetite change, fatigue, unexpected weight change and weight loss. HENT: Negative for congestion, nosebleeds and trouble swallowing. Eyes: Negative for pain and visual disturbance. Respiratory: Negative for cough, chest tightness and shortness of breath. Cardiovascular: Negative for chest pain, palpitations, orthopnea, leg swelling and PND. Gastrointestinal: Negative for abdominal pain, blood in stool, bowel incontinence and constipation. Endocrine: Negative for cold intolerance, heat intolerance, polydipsia and polyuria. Genitourinary: Negative for bladder incontinence, dysuria, flank pain and hematuria. Musculoskeletal: Positive for arthralgias (knees) and back pain. Negative for gait problem, myalgias and neck pain. Skin: Negative for rash. Neurological: Negative for dizziness, speech difficulty, weakness, light-headedness and headaches. Psychiatric/Behavioral: Positive for decreased concentration. Negative for confusion, dysphoric mood and sleep disturbance. The patient is nervous/anxious. Prior to Visit Medications    Medication Sig Taking?  Authorizing Provider   acetaminophen (TYLENOL) 500 MG tablet Take 1 tablet by mouth 4 times daily as needed for Pain Yes Jacqui Singh MD   Cyanocobalamin ER ([place]OVER VITAMIN B-12 TR) 1000 MCG TBCR take 1 tablet by mouth once daily Yes Jacqui Singh MD   glimepiride (AMARYL) 4 MG tablet take 1 tablet by mouth twice a day with meals  Carlos Carter MD   cloNIDine (CATAPRES) 0.1 MG tablet take 1 tablet by mouth twice a day  Jacqui Singh MD   atorvastatin (LIPITOR) 20 MG tablet take 1 tablet by mouth once daily  Jacqui Singh MD   amLODIPine-benazepril (LOTREL) 5-20 MG per capsule take 1 capsule by mouth once daily  Jacqui Singh MD   JANUVIA 100 MG tablet take 1 tablet by mouth once daily  Edil Wu MD RA ASPIRIN EC 81 MG EC tablet take 1 tablet by mouth once daily  Jacqui Singh MD   insulin glargine (BASAGLAR KWIKPEN) 100 UNIT/ML injection pen inject 10 units subcutaneously at bedtime DO NOT USE SAME PEN FOR MORE THAN 28 DAYS  Carlos Carter MD   blood glucose test strips (ONE TOUCH ULTRA TEST) strip TEST three times a day  Carlos Carter MD   Lancets MISC 1 each by In Vitro route 3 times daily DX: E11.65, IDDM (please dispense covered brand)  Carlos Carter MD   metFORMIN (GLUCOPHAGE) 1000 MG tablet take 1 tablet by mouth twice a day with meals  Jacqui Singh MD RA VITAMIN D-3 25 MCG (1000 UT) TABS tablet take 1 tablet by mouth once daily  Jacqui Singh MD health and help in better management of the current chronic conditions in the long run. 1. Essential hypertension            Patient strongly advised to stay on a healthy lifestyle including physical exercise and a low-sodium high potassium diet and to start checking blood pressure on a weekly basis at the local pharmacy, to call the office if blood pressure 140/90 or above. 2. Anemia due to vitamin B12 deficiency, unspecified B12 deficiency type           Asymptomatic, monitor CBC per usual routine in view of history of prostate cancer and to ensure compliance with medication    3. Chronic back pain greater than 3 months duration            Patient strongly advised to stop using ibuprofen due to kidney toxicity in the setting of diabetes, ACE inhibitor use. The patient stated today that he took some Tylenol from his girlfriend when he ran out of ibuprofen. He felt it was quite efficient and will use Tylenol from now on as well as more exercise. I have reviewed the patient's medical history in detail and updated the computerized patient record. Duration of today's telephone visit was of 15 minutes. An  electronic signature was used to authenticate this note. --Austin Rivera MD on 3/24/2020 at 5:44 PM        Pursuant to the emergency declaration under the [facility], [facility] waiver authority and the [employer] Act, this Virtual  Visit was conducted, with patient's consent, to reduce the patient's risk of exposure to COVID-19 and provide continuity of care for an established patient. Services were provided through a video synchronous discussion virtually to substitute for in-person clinic visit.

## 2020-04-27 RX ORDER — AVOBENZONE, HOMOSALATE, OCTISALATE, OCTOCRYLENE 30; 100; 50; 25 MG/ML; MG/ML; MG/ML; MG/ML
SPRAY TOPICAL
Qty: 30 TABLET | Refills: 5 | Status: SHIPPED | OUTPATIENT
Start: 2020-04-27 | End: 2020-08-25 | Stop reason: SDUPTHER

## 2020-05-04 DIAGNOSIS — D51.9 ANEMIA DUE TO VITAMIN B12 DEFICIENCY, UNSPECIFIED B12 DEFICIENCY TYPE: ICD-10-CM

## 2020-05-04 DIAGNOSIS — I10 ESSENTIAL HYPERTENSION: ICD-10-CM

## 2020-05-04 LAB
ANION GAP SERPL CALCULATED.3IONS-SCNC: 16 MEQ/L (ref 9–15)
BASOPHILS ABSOLUTE: 0 K/UL (ref 0–0.2)
BASOPHILS RELATIVE PERCENT: 0.4 %
BILIRUBIN URINE: NEGATIVE
BLOOD, URINE: NEGATIVE
BUN BLDV-MCNC: 16 MG/DL (ref 8–23)
CALCIUM SERPL-MCNC: 9.4 MG/DL (ref 8.5–9.9)
CHLORIDE BLD-SCNC: 105 MEQ/L (ref 95–107)
CHOLESTEROL, TOTAL: 127 MG/DL (ref 0–199)
CLARITY: CLEAR
CO2: 22 MEQ/L (ref 20–31)
COLOR: YELLOW
CREAT SERPL-MCNC: 1.35 MG/DL (ref 0.7–1.2)
EOSINOPHILS ABSOLUTE: 0.1 K/UL (ref 0–0.7)
EOSINOPHILS RELATIVE PERCENT: 2.3 %
GFR AFRICAN AMERICAN: >60
GFR NON-AFRICAN AMERICAN: 51.7
GLUCOSE BLD-MCNC: 143 MG/DL (ref 70–99)
GLUCOSE URINE: 250 MG/DL
HBA1C MFR BLD: 7.4 % (ref 4.8–5.9)
HCT VFR BLD CALC: 37 % (ref 42–52)
HDLC SERPL-MCNC: 33 MG/DL (ref 40–59)
HEMOGLOBIN: 11.7 G/DL (ref 14–18)
KETONES, URINE: ABNORMAL MG/DL
LDL CHOLESTEROL CALCULATED: 76 MG/DL (ref 0–129)
LEUKOCYTE ESTERASE, URINE: NEGATIVE
LYMPHOCYTES ABSOLUTE: 1.4 K/UL (ref 1–4.8)
LYMPHOCYTES RELATIVE PERCENT: 24.3 %
MCH RBC QN AUTO: 26.2 PG (ref 27–31.3)
MCHC RBC AUTO-ENTMCNC: 31.6 % (ref 33–37)
MCV RBC AUTO: 82.9 FL (ref 80–100)
MONOCYTES ABSOLUTE: 0.6 K/UL (ref 0.2–0.8)
MONOCYTES RELATIVE PERCENT: 10.8 %
NEUTROPHILS ABSOLUTE: 3.7 K/UL (ref 1.4–6.5)
NEUTROPHILS RELATIVE PERCENT: 62.2 %
NITRITE, URINE: NEGATIVE
PDW BLD-RTO: 16.6 % (ref 11.5–14.5)
PH UA: 5 (ref 5–9)
PLATELET # BLD: 230 K/UL (ref 130–400)
POTASSIUM SERPL-SCNC: 3.7 MEQ/L (ref 3.4–4.9)
PROTEIN UA: NEGATIVE MG/DL
RBC # BLD: 4.46 M/UL (ref 4.7–6.1)
SODIUM BLD-SCNC: 143 MEQ/L (ref 135–144)
SPECIFIC GRAVITY UA: 1.02 (ref 1–1.03)
TRIGL SERPL-MCNC: 90 MG/DL (ref 0–150)
TSH SERPL DL<=0.05 MIU/L-ACNC: 1.53 UIU/ML (ref 0.44–3.86)
UROBILINOGEN, URINE: 0.2 E.U./DL
WBC # BLD: 5.9 K/UL (ref 4.8–10.8)

## 2020-06-15 RX ORDER — GLIMEPIRIDE 4 MG/1
TABLET ORAL
Qty: 180 TABLET | Refills: 1 | Status: SHIPPED | OUTPATIENT
Start: 2020-06-15 | End: 2021-03-13

## 2020-06-15 RX ORDER — CLONIDINE HYDROCHLORIDE 0.1 MG/1
TABLET ORAL
Qty: 180 TABLET | Refills: 1 | Status: SHIPPED | OUTPATIENT
Start: 2020-06-15 | End: 2021-01-25 | Stop reason: SDUPTHER

## 2020-08-12 DIAGNOSIS — C61 PROSTATE CANCER (HCC): ICD-10-CM

## 2020-08-12 LAB — PROSTATE SPECIFIC ANTIGEN: 0.22 NG/ML (ref 0–6.22)

## 2020-08-25 ENCOUNTER — OFFICE VISIT (OUTPATIENT)
Dept: INTERNAL MEDICINE CLINIC | Age: 75
End: 2020-08-25
Payer: COMMERCIAL

## 2020-08-25 ENCOUNTER — HOSPITAL ENCOUNTER (EMERGENCY)
Age: 75
Discharge: HOME | End: 2020-08-25
Attending: EMERGENCY MEDICINE
Payer: COMMERCIAL

## 2020-08-25 ENCOUNTER — OFFICE VISIT (OUTPATIENT)
Dept: ENDOCRINOLOGY | Age: 75
End: 2020-08-25
Payer: COMMERCIAL

## 2020-08-25 VITALS
DIASTOLIC BLOOD PRESSURE: 78 MMHG | HEIGHT: 69 IN | OXYGEN SATURATION: 100 % | WEIGHT: 182 LBS | TEMPERATURE: 97.7 F | RESPIRATION RATE: 18 BRPM | HEART RATE: 75 BPM | BODY MASS INDEX: 26.96 KG/M2 | SYSTOLIC BLOOD PRESSURE: 128 MMHG

## 2020-08-25 VITALS
DIASTOLIC BLOOD PRESSURE: 81 MMHG | HEART RATE: 64 BPM | WEIGHT: 182 LBS | BODY MASS INDEX: 26.88 KG/M2 | SYSTOLIC BLOOD PRESSURE: 122 MMHG | OXYGEN SATURATION: 99 %

## 2020-08-25 VITALS
HEART RATE: 68 BPM | OXYGEN SATURATION: 97 % | SYSTOLIC BLOOD PRESSURE: 138 MMHG | BODY MASS INDEX: 26.88 KG/M2 | WEIGHT: 182 LBS | DIASTOLIC BLOOD PRESSURE: 74 MMHG

## 2020-08-25 LAB
CHP ED QC CHECK: NORMAL
GLUCOSE BLD-MCNC: 224 MG/DL
HBA1C MFR BLD: 7.6 %

## 2020-08-25 PROCEDURE — 6370000000 HC RX 637 (ALT 250 FOR IP): Performed by: EMERGENCY MEDICINE

## 2020-08-25 PROCEDURE — 3051F HG A1C>EQUAL 7.0%<8.0%: CPT | Performed by: INTERNAL MEDICINE

## 2020-08-25 PROCEDURE — 4040F PNEUMOC VAC/ADMIN/RCVD: CPT | Performed by: FAMILY MEDICINE

## 2020-08-25 PROCEDURE — 1123F ACP DISCUSS/DSCN MKR DOCD: CPT | Performed by: FAMILY MEDICINE

## 2020-08-25 PROCEDURE — 1036F TOBACCO NON-USER: CPT | Performed by: INTERNAL MEDICINE

## 2020-08-25 PROCEDURE — 69209 REMOVE IMPACTED EAR WAX UNI: CPT

## 2020-08-25 PROCEDURE — G8427 DOCREV CUR MEDS BY ELIG CLIN: HCPCS | Performed by: INTERNAL MEDICINE

## 2020-08-25 PROCEDURE — 1036F TOBACCO NON-USER: CPT | Performed by: FAMILY MEDICINE

## 2020-08-25 PROCEDURE — 99213 OFFICE O/P EST LOW 20 MIN: CPT | Performed by: INTERNAL MEDICINE

## 2020-08-25 PROCEDURE — 82962 GLUCOSE BLOOD TEST: CPT | Performed by: INTERNAL MEDICINE

## 2020-08-25 PROCEDURE — 4040F PNEUMOC VAC/ADMIN/RCVD: CPT | Performed by: INTERNAL MEDICINE

## 2020-08-25 PROCEDURE — G8417 CALC BMI ABV UP PARAM F/U: HCPCS | Performed by: INTERNAL MEDICINE

## 2020-08-25 PROCEDURE — 83036 HEMOGLOBIN GLYCOSYLATED A1C: CPT | Performed by: INTERNAL MEDICINE

## 2020-08-25 PROCEDURE — 1123F ACP DISCUSS/DSCN MKR DOCD: CPT | Performed by: INTERNAL MEDICINE

## 2020-08-25 PROCEDURE — G8417 CALC BMI ABV UP PARAM F/U: HCPCS | Performed by: FAMILY MEDICINE

## 2020-08-25 PROCEDURE — 3017F COLORECTAL CA SCREEN DOC REV: CPT | Performed by: INTERNAL MEDICINE

## 2020-08-25 PROCEDURE — G8427 DOCREV CUR MEDS BY ELIG CLIN: HCPCS | Performed by: FAMILY MEDICINE

## 2020-08-25 PROCEDURE — 2022F DILAT RTA XM EVC RTNOPTHY: CPT | Performed by: INTERNAL MEDICINE

## 2020-08-25 PROCEDURE — 99214 OFFICE O/P EST MOD 30 MIN: CPT | Performed by: FAMILY MEDICINE

## 2020-08-25 PROCEDURE — 99282 EMERGENCY DEPT VISIT SF MDM: CPT

## 2020-08-25 PROCEDURE — 3017F COLORECTAL CA SCREEN DOC REV: CPT | Performed by: FAMILY MEDICINE

## 2020-08-25 RX ORDER — ASPIRIN 81 MG/1
TABLET ORAL
Qty: 90 TABLET | Refills: 1 | Status: SHIPPED | OUTPATIENT
Start: 2020-08-25 | End: 2021-12-15 | Stop reason: SDUPTHER

## 2020-08-25 RX ORDER — ACETAMINOPHEN 500 MG
1000 TABLET ORAL ONCE
Status: COMPLETED | OUTPATIENT
Start: 2020-08-25 | End: 2020-08-25

## 2020-08-25 RX ORDER — AMLODIPINE BESYLATE AND BENAZEPRIL HYDROCHLORIDE 5; 20 MG/1; MG/1
CAPSULE ORAL
Qty: 30 CAPSULE | Refills: 2 | Status: SHIPPED | OUTPATIENT
Start: 2020-08-25 | End: 2021-01-11 | Stop reason: SDUPTHER

## 2020-08-25 RX ORDER — ATORVASTATIN CALCIUM 20 MG/1
TABLET, FILM COATED ORAL
Qty: 30 TABLET | Refills: 2 | Status: SHIPPED | OUTPATIENT
Start: 2020-08-25 | End: 2021-01-25 | Stop reason: SDUPTHER

## 2020-08-25 RX ORDER — LANCETS 30 GAUGE
1 EACH MISCELLANEOUS 3 TIMES DAILY
Qty: 100 EACH | Refills: 6 | Status: SHIPPED | OUTPATIENT
Start: 2020-08-25 | End: 2020-11-30 | Stop reason: SDUPTHER

## 2020-08-25 RX ORDER — AVOBENZONE, HOMOSALATE, OCTISALATE, OCTOCRYLENE 30; 100; 50; 25 MG/ML; MG/ML; MG/ML; MG/ML
SPRAY TOPICAL
Qty: 30 TABLET | Refills: 5 | Status: SHIPPED | OUTPATIENT
Start: 2020-08-25 | End: 2021-02-25 | Stop reason: SDUPTHER

## 2020-08-25 RX ORDER — INSULIN GLARGINE 100 [IU]/ML
INJECTION, SOLUTION SUBCUTANEOUS
Qty: 5 PEN | Refills: 3 | Status: SHIPPED | OUTPATIENT
Start: 2020-08-25 | End: 2021-02-25 | Stop reason: SDUPTHER

## 2020-08-25 RX ORDER — DEXTROSE 4 G
TABLET,CHEWABLE ORAL
Qty: 100 EACH | Refills: 1 | Status: SHIPPED | OUTPATIENT
Start: 2020-08-25 | End: 2022-08-30 | Stop reason: SDUPTHER

## 2020-08-25 RX ADMIN — ACETAMINOPHEN 1000 MG: 500 TABLET ORAL at 20:45

## 2020-08-25 ASSESSMENT — PAIN SCALES - GENERAL: PAINLEVEL_OUTOF10: 8

## 2020-08-25 NOTE — PATIENT INSTRUCTIONS
hearing. Watch closely for changes in your health, and be sure to contact your doctor if:  · You have pain or reduced hearing after 1 week of home treatment. · You have any new symptoms, such as nausea or balance problems. Where can you learn more? Go to [email or web address] and sign in to your [employer] account. Enter O757 in the Nuenz box to learn more about \"Earwax Blockage: Care Instructions. \"     If you do not have an account, please click on the \"Sign Up Now\" link. Current as of: June 26, 2019               Content Version: 12.5  © 1246-1396 Healthwise, Incorporated. Care instructions adapted under license by [facility] ([facility]). If you have questions about a medical condition or this instruction, always ask your healthcare professional. [address] any warranty or liability for your use of this information.

## 2020-08-25 NOTE — PROGRESS NOTES
Subjective:      Patient ID: Jelena Ramirez. is a 77 y.o. male. Follow-up on type 2 diabetes patient on Lantus 10 units at bedtime plus oral agents last hemoglobin A1c was 7.6  Currently on metformin thousand milligrams twice daily glimepiride 4 mg twice daily Januvia 1 mg daily testing months twice daily  Complications including chronic kidney disease  Diabetes   He presents for his follow-up diabetic visit. He has type 2 diabetes mellitus. His disease course has been stable. Diabetic complications include nephropathy. Current diabetic treatment includes insulin injections. He is currently taking insulin at bedtime. His overall blood glucose range is 140-180 mg/dl. (Lab Results       Component                Value               Date                       LABA1C                   7.6                 08/25/2020              )       Results for Chang Pinzon (MRN [id number]) as of 9/1/2020 07:48   Ref. Range 5/4/2020 13:05   Sodium Latest Ref Range: 135 - 144 mEq/L 143   Potassium Latest Ref Range: 3.4 - 4.9 mEq/L 3.7   Chloride Latest Ref Range: 95 - 107 mEq/L 105   CO2 Latest Ref Range: 20 - 31 mEq/L 22   BUN Latest Ref Range: 8 - 23 mg/dL 16   Creatinine Latest Ref Range: 0.70 - 1.20 mg/dL 1.35 (H)   Anion Gap Latest Ref Range: 9 - 15 mEq/L 16 (H)   GFR Non- Latest Ref Range: >60  51.7 (L)   GFR  Latest Ref Range: >60  >60.0   Glucose Latest Ref Range: 70 - 99 mg/dL 143 (H)   Calcium Latest Ref Range: 8.5 - 9.9 mg/dL 9.4     Results for Chang Pinzon (MRN [id number]) as of 8/25/2020 14:23   Ref. Range 9/24/2019 12:25 2/5/2020 17:06 5/4/2020 13:05 8/25/2020 14:11   Hemoglobin A1C Latest Units: % 7.6 (H) 7.8 (H) 7.4 (H) 7.6     Patient Active Problem List   Diagnosis    Essential hypertension, benign    Obesity (BMI 30-39. 9)    Hyperlipidemia with target LDL less than 100    Low HDL (under 40)    Pes planus    Normocytic anemia    Erectile dysfunction associated with type 2 diabetes mellitus ([facility] [address]    Knee pain    Anemia due to vitamin B12 deficiency    Hypercalcemia    Uncontrolled type 2 diabetes mellitus with microalbuminuria (HCC)    Adenomatous polyp of descending colon    History of prostate cancer     No Known Allergies    Current Outpatient Medications:     insulin glargine (LANTUS SOLOSTAR) 100 UNIT/ML injection pen, 10 units at bedtime, Disp: 5 pen, Rfl: 3    blood glucose test strips (ONE TOUCH ULTRA TEST) strip, TEST three times a day, Disp: 100 strip, Rfl: 3    glimepiride (AMARYL) 4 MG tablet, take 1 tablet by mouth twice a day with meals, Disp: 180 tablet, Rfl: 1    cloNIDine (CATAPRES) 0.1 MG tablet, take 1 tablet by mouth twice a day, Disp: 180 tablet, Rfl: 1    acetaminophen (TYLENOL) 500 MG tablet, Take 1 tablet by mouth 4 times daily as needed for Pain, Disp: 120 tablet, Rfl: 2    Cyanocobalamin ER (RA VITAMIN B-12 TR) 1000 MCG TBCR, take 1 tablet by mouth once daily, Disp: 90 tablet, Rfl: 2    insulin glargine (BASAGLAR KWIKPEN) 100 UNIT/ML injection pen, inject 10 units subcutaneously at bedtime DO NOT USE SAME PEN FOR MORE THAN 28 DAYS, Disp: 15 mL, Rfl: 3    hydrocortisone 2.5 % cream, apply to affected area twice a day (DO NOT APPLY TO FACE), Disp: , Rfl: 0    sildenafil (VIAGRA) 100 MG tablet, Take 1 tablet by mouth daily as needed for Erectile Dysfunction, Disp: 5 tablet, Rfl: 11    Blood Glucose Monitoring Suppl (ONE TOUCH ULTRA MINI) w/Device KIT, 1 kit by Does not apply route 3 times daily DX: E11.65, IDDM, Disp: 1 kit, Rfl: 0    ONE TOUCH ULTRASOFT LANCETS MISC, TEST once daily, Disp: 100 each, Rfl: 3    Insulin Pen Needle (B-D UF III MINI PEN NEEDLES) 31G X 5 MM MISC, use as directed daily, Disp: 100 each, Rfl: 3    amLODIPine-benazepril (LOTREL) 5-20 MG per capsule, take 1 capsule by mouth once daily, Disp: 30 capsule, Rfl: 2    atorvastatin (LIPITOR) 20 MG tablet, take 1 tablet by mouth once daily, Disp: 30 tablet, Rfl: 2   5 pen     Refill:  3     Continue metformin twice daily glimepiride 4 mg twice daily continue milligrams daily A1c goal of 7-8        Leda Montilla MD

## 2020-08-26 NOTE — ED PROVIDER NOTES
[address] [facility] ED  eMERGENCY dEPARTMENT eNCOUnter      Pt Name: Lidia Groves MRN: [id number]  Birthdate 1945  Date of evaluation: 8/25/2020  Provider: Donna Bishop MD    CHIEF COMPLAINT       Chief Complaint   Patient presents with    Otalgia     right ear wax. HISTORY OF PRESENT ILLNESS   (Location/Symptom, Timing/Onset,Context/Setting, Quality, Duration, Modifying Factors, Severity)  Note limiting factors. Lidia Grovse is a 77 y.o. male who presents to the emergency department with cerumen impaction. He was already is primary doctor who prescribed Debrox, now is in the emergency department. He does not have pain. He does have some fullness and some hearing loss in the right ear from earwax. There is no sore throat or COVID symptoms his sugars been running quite well. He seems anxious about the cerumen impaction    HPI    NursingNotes were reviewed. REVIEW OF SYSTEMS    (2-9 systems for level 4, 10 or more for level 5)     Review of Systems     Constitutional symptoms:  no Fatigue, no fever, no chills. Sugars were running very well  Skin symptoms:  Negative except as documented in HPI. ENMT symptoms:  Negative except as documented in HPI. Right ear fullness  Respiratory symptoms:  Negative except as documented in HPI. Cardiovascular symptoms:  Negative except as documented in HPI. Gastrointestinal symptoms: Negative except for documented as above in the HPI   Genitourinary symptoms:  Negative except as documented in HPI. Musculoskeletal symptoms:  Negative except as documented in HPI. Neurologic symptoms:  Negative except as documented in HPI. Remainder of 10 systems, all negative except for mentioned above      Except as noted above the remainder of the review of systems was reviewed and negative.        PAST MEDICAL HISTORY     Past Medical History:   Diagnosis Date    Adenocarcinoma of prostate, stage 2 ([facility] 2018    Jim 3+4, Dr Aroldo Moya  PSA 7.69 intermediate risk    Erectile dysfunction associated with type 2 diabetes mellitus ([facility]     Essential hypertension, benign     Intellectual disability     Low HDL (under 40)     Obesity (BMI 30-39. 9)     Pes planus of both feet 2015    Dr Enriqueta Hdez S/P colonoscopy with polypectomy 2014, 2019    Dr Pat Duran Uncontrolled type 2 diabetes mellitus with microalbuminuria [facility]) 2007    Dr Nena Caputo Unspecified deformity of ankle and foot, acquired     Vitamin B12 deficiency anemia 2016         SURGICALHISTORY       Past Surgical History:   Procedure Laterality Date    COLONOSCOPY  03/20/2014    Polyp, Diverticulosis, Internal Hemorrhoids-Dr Sanders    COLONOSCOPY N/A 4/11/2019    COLORECTAL CANCER SCREENING, NOT HIGH RISK performed by Rhona Carranza MD at [place] [address].      Full upper/lower extractions    PROSTATE BIOPSY  1/2015    Dr Pilar Sahu       Discharge Medication List as of 8/25/2020  8:45 PM      CONTINUE these medications which have NOT CHANGED    Details   !! insulin glargine (LANTUS SOLOSTAR) 100 UNIT/ML injection pen 10 units at bedtime, Disp-5 pen,R-3Normal      amLODIPine-benazepril (LOTREL) 5-20 MG per capsule take 1 capsule by mouth once daily, Disp-30 capsule,R-2Normal      atorvastatin (LIPITOR) 20 MG tablet take 1 tablet by mouth once daily, Disp-30 tablet,R-2Normal      metFORMIN (GLUCOPHAGE) 1000 MG tablet take 1 tablet by mouth twice a day with meals, Disp-180 tablet,R-1Normal      vitamin D (RA VITAMIN D-3) 25 MCG (1000 UT) TABS tablet take 1 tablet by mouth once daily, Disp-30 tablet,R-5Normal      aspirin (RA ASPIRIN EC) 81 MG EC tablet take 1 tablet by mouth once daily, Disp-90 tablet,R-1Normal      SITagliptin (JANUVIA) 100 MG tablet take 1 tablet by mouth once daily, Disp-90 tablet,R-3Normal      RA Alcohol Swabs 70 % PADS Disp-100 each,R-1, Normaluse as directed once daily      !!  Lancets MISC 3 TIMES DAILY Starting Tue 8/25/2020, Disp-100 each,R-6, NormalDX: E11.65, IDDM (please dispense covered brand)      blood glucose test strips (ONE TOUCH ULTRA TEST) strip TEST three times a day, Disp-100 strip,R-3Normal      glimepiride (AMARYL) 4 MG tablet take 1 tablet by mouth twice a day with meals, Disp-180 tablet, R-1Normal      cloNIDine (CATAPRES) 0.1 MG tablet take 1 tablet by mouth twice a day, Disp-180 tablet,R-1Normal      acetaminophen (TYLENOL) 500 MG tablet Take 1 tablet by mouth 4 times daily as needed for Pain, Disp-120 tablet, R-2Normal      Cyanocobalamin ER (RA VITAMIN B-12 TR) 1000 MCG TBCR take 1 tablet by mouth once daily, Disp-90 tablet, R-2Normal      !! insulin glargine (BASAGLAR KWIKPEN) 100 UNIT/ML injection pen inject 10 units subcutaneously at bedtime DO NOT USE SAME PEN FOR MORE THAN 28 DAYS, Disp-15 mL, R-3Normal      hydrocortisone 2.5 % cream apply to affected area twice a day (DO NOT APPLY TO FACE), R-0, Historical Med      sildenafil (VIAGRA) 100 MG tablet Take 1 tablet by mouth daily as needed for Erectile Dysfunction, Disp-5 tablet, R-11Print      Insulin Pen Needle (B-D UF III MINI PEN NEEDLES) 31G X 5 MM MISC Disp-100 each, R-3, NormalUSE AS DIRECTED DAILY      Blood Glucose Monitoring Suppl (ONE TOUCH ULTRA MINI) w/Device KIT 3 TIMES DAILY Starting u 7/26/2018, Disp-1 kit, R-0, NormalDX: E11.65, IDDM      !! ONE TOUCH ULTRASOFT LANCETS MISC Disp-100 each, R-3, Normal       !! - Potential duplicate medications found. Please discuss with provider. ALLERGIES     Patient has no known allergies.     FAMILY HISTORY       Family History   Problem Relation Age of Onset   [place] Stroke Mother         [de-identified] age [de-identified] Cancer Brother         liver, dec age 79    Diabetes Father     Cancer Father         prostate          SOCIAL HISTORY       Social History     Socioeconomic History    Marital status: Single     Spouse name: None    Number of children: 2    Years of education: None    Highest education level: None Occupational History    Occupation: Fast food, others, SSI   Social Needs    Financial resource strain: None    Food insecurity     Worry: None     Inability: None    Transportation needs     Medical: None     Non-medical: None   Tobacco Use    Smoking status: Former Smoker     Packs/day: 0.50     Years: 53.00     Pack years: 26.50     Types: Cigarettes     Start date:      Last attempt to quit:      Years since quittin.6    Smokeless tobacco: Never Used   Substance and Sexual Activity    Alcohol use: Yes     Comment: occasionally, beer- recovering alcoholic    Drug use: No    Sexual activity: Yes     Partners: Female   Lifestyle    Physical activity     Days per week: None     Minutes per session: None    Stress: None   Relationships    Social connections     Talks on phone: None     Gets together: None     Attends Mormonism service: None     Active member of club or organization: None     Attends meetings of clubs or organizations: None     Relationship status: None    Intimate partner violence     Fear of current or ex partner: None     Emotionally abused: None     Physically abused: None     Forced sexual activity: None   Other Topics Concern    None   Social History Narrative    Born in [facility] [place], one of 6    Lives in a house in [place] with girlfriend    2 sons in [place], keeps in touch with one of them    Grands: many    Worked \"everywhere\", on disability         Hobbies: video games       SCREENINGS      @[employer]([id number])@      PHYSICAL EXAM    (up to 7 for level 4, 8 or more for level 5)     ED Triage Vitals [20]   BP Temp Temp Source Pulse Resp SpO2 Height Weight   136/76 97.7 °F (36.5 °C) Oral 74 18 99 % 5' 9\" (1.753 m) 182 lb (82.6 kg)       Physical Exam     CONST: -Well-developed well-nourished ;                -In no acute distress. Seems anxious                -Vitals reviewed.     EYES: -EOM intact, TIFFANY:              -Sclera normal and conjunctiva: clear bilaterally. ENT: - Normal pharynx pink and moist.  Cerumen partially obscuring tympanic membrane on the right side no tympanic membrane hyperemia  NECK: -Supple (chin-to-chest). CARD: -Rate and rhythm: Regular              -Murmurs: No  RESP: -Respiratory effort and chest excursion with respirations: Normal             -Breath sounds equal bilaterally: Clear             -Wheezes: No             -Rales: No    BACK: -Flank pain: No              -Pain on palpation: No    ABD: -Distended: No           -Bruits: No           -Bowel sounds: Normal.           -Deep palpation: Non-tender           -Organomegaly palpable: No           -Abnormal masses: No    EXT: Gross appearance and use of all four extremities: Normal     SKIN: -Good turgor warm and dry. -Apparent lesions or rashes: No    NEURO: -Patient: alert                -Oriented to: person, place and time. -Appearance and judgment: appropriate.                -Cranial Nerves: Normal.                -Speech: Normal          DIAGNOSTIC RESULTS     EKG: All EKG's are interpreted by the Emergency Department Physician who either signs or Co-signsthis chart in the absence of a cardiologist.        RADIOLOGY:   Aby Roderick such as CT, Ultrasound and MRI are read by the radiologist. Plain radiographic images are visualized and preliminarily interpreted by the emergency physician with the below findings:        Interpretation per the Radiologist below, if available at the time ofthis note:    No orders to display         ED BEDSIDE ULTRASOUND:   Performed by ED Physician - none    LABS:  Labs Reviewed - No data to display    All other labs were within normal range or not returned as of this dictation.     EMERGENCY DEPARTMENT COURSE and DIFFERENTIAL DIAGNOSIS/MDM:   Vitals:    Vitals:    08/25/20 1958 08/25/20 2059   BP: 136/76 128/78   Pulse: 74 75   Resp: 18 18   Temp: 97.7 °F (36.5 °C) 97.7 °F (36.5 °C)   TempSrc: Oral Oral   SpO2: 99% 100%   Weight: 182 lb (82.6 kg)    Height: 5' 9\" (1.753 m)            MDM   Ear irrigation was done, Debrox prescribed, ENT follow-up recommended for recurrence    CRITICAL CARE TIME   Total Critical Care time was  minutes, excluding separately reportableprocedures. There was a high probability of clinicallysignificant/life threatening deterioration in the patient's condition which required my urgent intervention. CONSULTS:  None    PROCEDURES:  Unless otherwise noted below, none     Procedures    FINAL IMPRESSION      1.  Impacted cerumen of right ear          DISPOSITION/PLAN   DISPOSITION Decision To Discharge 08/25/2020 08:44:10 PM      PATIENT REFERRED TO:  Nicolle Lane MD  [address] [place] [address]  [address] [place] [address]  [phone]    In 1 week  As needed      DISCHARGE MEDICATIONS:  Discharge Medication List as of 8/25/2020  8:45 PM      START taking these medications    Details   carbamide peroxide (DEBROX) 6.5 % otic solution Place 5 drops into the right ear 2 times daily, Disp-1 Bottle,R-0Print                (Please note that portions of this note were completed with a voice recognition program.  Efforts were made to edit the dictations but occasionally words are mis-transcribed.)    Aubrie Huff MD (electronically signed)  Attending Emergency Physician          Aubrie Huff MD  08/26/20 [id number]

## 2020-08-26 NOTE — ED TRIAGE NOTES
Patient went to his doctors today to get ear wax out of his right ear. He still cant hear. He is alert and orientated x 4. Pink, warm and dry. Abc's are intact. Will continue to monitor.

## 2020-08-26 NOTE — ED NOTES
Irrigated right ear with warm water per order.  Patient states ear feels better     Radhika Ríos RN  08/25/20 2059

## 2020-08-31 RX ORDER — PEN NEEDLE, DIABETIC 31 GX5/16"
NEEDLE, DISPOSABLE MISCELLANEOUS
Qty: 100 EACH | Refills: 3 | Status: SHIPPED | OUTPATIENT
Start: 2020-08-31 | End: 2021-02-25 | Stop reason: SDUPTHER

## 2020-09-06 ASSESSMENT — ENCOUNTER SYMPTOMS
CONSTIPATION: 0
VOMITING: 0
COUGH: 0
NAUSEA: 0
DIARRHEA: 0
TROUBLE SWALLOWING: 0
SHORTNESS OF BREATH: 0

## 2020-09-07 NOTE — PROGRESS NOTES
Subjective:      Patient ID: Rosa M Gaitan. is a 77 y.o. male who presents for:  Chief Complaint   Patient presents with   Booker Ibrahim Patient     Mr. Jose Luis Torres is a 77 YOM with medical history significant for T2DM, Hypertension, and a h/o adenocarcinoma of the prostate gland who presents to our office today to establish care. Hypertension:   Patient is here regarding his elevated blood pressure. He is not exercising and is not adherent to low salt diet. Blood pressure is not monitored at home. Cardiac symptoms none. Patient denies chest pressure/discomfort, dyspnea, exertional chest pressure/discomfort, irregular heart beat, near-syncope, palpitations, paroxysmal nocturnal dyspnea and tachypnea. Cardiovascular risk factors: advanced age (older than 54 for men, 72 for women), diabetes mellitus, dyslipidemia, hypertension and male gender. Use of agents associated with hypertension: none. History of target organ damage: none. Past Medical History:   Diagnosis Date    Adenocarcinoma of prostate, stage 2 ([facility] 2018    Jim 3+4, Dr Alvarez Began  PSA 7.69 intermediate risk    [facility]     Essential hypertension, benign     Intellectual disability     Low HDL (under 40)     Obesity (BMI 30-39. 9)     Pes planus of both feet 2015    Dr Chris Ortega S/P colonoscopy with polypectomy 2014, 2019    Dr Laura Medrano Uncontrolled type 2 diabetes mellitus with microalbuminuria [facility]) 2007    Dr Michelle Ryan    Unspecified deformity of ankle and foot, acquired     Vitamin B12 deficiency anemia 2016     Past Surgical History:   Procedure Laterality Date    COLONOSCOPY  03/20/2014    Polyp, Diverticulosis, Internal Hemorrhoids-Dr Sanders    COLONOSCOPY N/A 4/11/2019    COLORECTAL CANCER SCREENING, NOT HIGH RISK performed by Danilo Collins MD at [place] [address].      Full upper/lower extractions    PROSTATE BIOPSY  1/2015    Dr Anaya Oleary History     Socioeconomic History    Marital status: Single     Spouse name: Not on file    Number of children: 2    Years of education: Not on file    Highest education level: Not on file   Occupational History    Occupation: Fast food, others, SSI   Social Needs    Financial resource strain: Not on file    Food insecurity     Worry: Not on file     Inability: Not on file   [employer] needs     Medical: Not on file     Non-medical: Not on file   Tobacco Use    Smoking status: Former Smoker     Packs/day: 0.50     Years: 53.00     Pack years: 26.50     Types: Cigarettes     Start date:      Last attempt to quit:      Years since quittin.6    Smokeless tobacco: Never Used   Substance and Sexual Activity    Alcohol use: Yes     Comment: occasionally, beer- recovering alcoholic    Drug use: No    Sexual activity: Yes     Partners: Female   Lifestyle    Physical activity     Days per week: Not on file     Minutes per session: Not on file    Stress: Not on file   Relationships    Social connections     Talks on phone: Not on file     Gets together: Not on file     Attends Confucianist service: Not on file     Active member of club or organization: Not on file     Attends meetings of clubs or organizations: Not on file     Relationship status: Not on file    Intimate partner violence     Fear of current or ex partner: Not on file     Emotionally abused: Not on file     Physically abused: Not on file     Forced sexual activity: Not on file   Other Topics Concern    Not on file   Social History Narrative    Born in [facility] [place], one of 6    Lives in a house in [place] with girlfriend    2 sons in [place], keeps in touch with one of them    Grands: many    Worked \"everywhere\", on disability         Hobbies: video games     Family History   Problem Relation Age of Onset    Stroke Mother         dec age [de-identified]    Cancer Brother         liver, dec age 79    Diabetes Father     Cancer Father prostate     Allergies:  Patient has no known allergies. Review of Systems   Constitutional: Negative for fever and unexpected weight change. HENT: Negative for congestion, ear pain, hearing loss and trouble swallowing. Eyes: Negative for visual disturbance. Respiratory: Negative for cough and shortness of breath. Cardiovascular: Negative for chest pain. Gastrointestinal: Negative for constipation, diarrhea, nausea and vomiting. Endocrine: Negative for cold intolerance and heat intolerance. Genitourinary: Negative for dysuria, flank pain and hematuria. Musculoskeletal: Negative for arthralgias. Skin: Negative for rash. Neurological: Negative for dizziness, weakness, light-headedness and numbness. Hematological: Does not bruise/bleed easily. Psychiatric/Behavioral: Negative for dysphoric mood and sleep disturbance. The patient is not nervous/anxious. Objective:   /81   Pulse 64   Wt 182 lb (82.6 kg)   SpO2 99%   BMI 26.88 kg/m²      Physical Exam  Vitals signs and nursing note reviewed. Constitutional:       General: He is not in acute distress. Appearance: He is well-developed. He is not diaphoretic. HENT:      Head: Normocephalic and atraumatic. Right Ear: External ear normal. There is impacted cerumen. Left Ear: External ear normal. There is impacted cerumen. Nose: Nose normal.      Mouth/Throat:      Pharynx: No oropharyngeal exudate. Eyes:      General:         Right eye: No discharge. Conjunctiva/sclera: Conjunctivae normal.      Pupils: Pupils are equal, round, and reactive to light. Neck:      Musculoskeletal: Normal range of motion and neck supple. Thyroid: No thyromegaly. Cardiovascular:      Rate and Rhythm: Normal rate and regular rhythm. Heart sounds: Normal heart sounds. No murmur. Pulmonary:      Effort: Pulmonary effort is normal. No respiratory distress. Breath sounds: Normal breath sounds.  No wheezing or rales.   Chest:      Chest wall: No tenderness. Abdominal:      General: Bowel sounds are normal. There is no distension. Palpations: Abdomen is soft. There is no mass. Tenderness: There is no abdominal tenderness. Musculoskeletal: Normal range of motion. General: No tenderness. Right lower leg: No edema. Left lower leg: No edema. Lymphadenopathy:      Cervical: No cervical adenopathy. Skin:     General: Skin is warm and dry. Findings: No rash. Neurological:      Mental Status: He is alert. Mental status is at baseline. Cranial Nerves: No cranial nerve deficit. Sensory: No sensory deficit. Motor: No abnormal muscle tone. Deep Tendon Reflexes: Reflexes are normal and symmetric. Psychiatric:         Mood and Affect: Mood normal.         Assessment:       Diagnosis Orders   1. Essential hypertension, benign  amLODIPine-benazepril (LOTREL) 5-20 MG per capsule   2. Bilateral impacted cerumen     3. Hyperlipidemia with target LDL less than 100  atorvastatin (LIPITOR) 20 MG tablet   4. GOA (generalized osteoarthritis)     5. Medication refill  amLODIPine-benazepril (LOTREL) 5-20 MG per capsule    atorvastatin (LIPITOR) 20 MG tablet    metFORMIN (GLUCOPHAGE) 1000 MG tablet    vitamin D (RA VITAMIN D-3) 25 MCG (1000 UT) TABS tablet    aspirin (RA ASPIRIN EC) 81 MG EC tablet         Plan:      No orders of the defined types were placed in this encounter.     Orders Placed This Encounter   Medications    amLODIPine-benazepril (LOTREL) 5-20 MG per capsule     Sig: take 1 capsule by mouth once daily     Dispense:  30 capsule     Refill:  2    atorvastatin (LIPITOR) 20 MG tablet     Sig: take 1 tablet by mouth once daily     Dispense:  30 tablet     Refill:  2    metFORMIN (GLUCOPHAGE) 1000 MG tablet     Sig: take 1 tablet by mouth twice a day with meals     Dispense:  180 tablet     Refill:  1    vitamin D (RA VITAMIN D-3) 25 MCG (1000 UT) TABS tablet     Sig:

## 2020-10-22 ENCOUNTER — TELEPHONE (OUTPATIENT)
Dept: INTERNAL MEDICINE CLINIC | Age: 75
End: 2020-10-22

## 2020-10-22 NOTE — TELEPHONE ENCOUNTER
Notified [place] pt of need to reschedule with new PCP, appt per patient preference with Dr. Gloria Romeo 2/25/2021, following current endocrine appt.

## 2020-11-30 RX ORDER — LANCETS 30 GAUGE
EACH MISCELLANEOUS
Qty: 100 EACH | Refills: 6 | Status: SHIPPED | OUTPATIENT
Start: 2020-11-30 | End: 2022-03-10 | Stop reason: SDUPTHER

## 2020-11-30 RX ORDER — LANCETS 30 GAUGE
1 EACH MISCELLANEOUS 3 TIMES DAILY
Qty: 100 EACH | Refills: 6 | Status: SHIPPED | OUTPATIENT
Start: 2020-11-30 | End: 2021-01-18 | Stop reason: SDUPTHER

## 2020-11-30 NOTE — TELEPHONE ENCOUNTER
Patient request a RX refill for lancets, one touch ultra test strips, send RX to listed [employer] [facility].

## 2021-01-11 DIAGNOSIS — I10 ESSENTIAL HYPERTENSION, BENIGN: ICD-10-CM

## 2021-01-11 DIAGNOSIS — Z76.0 MEDICATION REFILL: ICD-10-CM

## 2021-01-11 RX ORDER — AMLODIPINE BESYLATE AND BENAZEPRIL HYDROCHLORIDE 5; 20 MG/1; MG/1
CAPSULE ORAL
Qty: 30 CAPSULE | Refills: 1 | Status: SHIPPED | OUTPATIENT
Start: 2021-01-11 | End: 2021-02-25 | Stop reason: SDUPTHER

## 2021-01-11 NOTE — TELEPHONE ENCOUNTER
The patient's requested prescriptions have been refilled in ordered to prevent lapse in previously-established treatment. He/She will have to be seen by me or other physician in office or virtually within the next 1-2 months in order to determine appropriateness of medication and its continuation.

## 2021-01-11 NOTE — TELEPHONE ENCOUNTER
Rx request   Requested Prescriptions     Pending Prescriptions Disp Refills    amLODIPine-benazepril (LOTREL) 5-20 MG per capsule 30 capsule 2     Sig: take 1 capsule by mouth once daily     LOV 8/25/2020  Next Visit Date:  Future Appointments   Date Time Provider Leonides Cohen   2/16/2021  2:45 PM Radha Nazario MD [facility]   2/25/2021  1:00 PM Jordyn Monterroso MD [facility]   2/25/2021  1:30 PM Tram Perez MD [address] [place] [address]

## 2021-01-18 RX ORDER — LANCETS 30 GAUGE
1 EACH MISCELLANEOUS 3 TIMES DAILY
Qty: 100 EACH | Refills: 6 | Status: SHIPPED | OUTPATIENT
Start: 2021-01-18 | End: 2021-10-20 | Stop reason: SDUPTHER

## 2021-01-18 NOTE — TELEPHONE ENCOUNTER
Patient requesting medication refill.  Please approve or deny this request.    Rx requested:  Requested Prescriptions     Pending Prescriptions Disp Refills    Lancets MISC 100 each 6     Si each by In Vitro route 3 times daily DX: E11.65, IDDM (please dispense covered brand)     Also needs One Touch Ultra Testing Strips    Last Office Visit:   2020      Next Visit Date:  Future Appointments   Date Time Provider Leonides Vernoni   2021  2:45 PM Naila Pereyra MD [facility]   2021  1:00 PM CINTHIA Altman, MD [facility]   2021  1:30 PM Khushboo Anton MD [address] [place] [address]

## 2021-01-19 RX ORDER — BLOOD-GLUCOSE METER
1 KIT MISCELLANEOUS DAILY PRN
Qty: 1 KIT | Refills: 0 | Status: CANCELLED | OUTPATIENT
Start: 2021-01-19

## 2021-01-20 NOTE — TELEPHONE ENCOUNTER
patient requesting medication refill. Please approve or deny this request.    Rx requested:  Requested Prescriptions     Pending Prescriptions Disp Refills    blood glucose test strips (ASCENSIA AUTODISC VI;ONE TOUCH ULTRA TEST VI) strip 100 each 3     Si each by In Vitro route daily As needed.          Last Office Visit:   2020      Next Visit Date:  Future Appointments   Date Time Provider Leonides Cohen   2021  2:45 PM Ame Turner MD [facility]   2021  1:00 PM Sarai Piña MD [facility]   2021  1:30 PM Flavia Hyde MD [address] [place] [address]

## 2021-01-25 DIAGNOSIS — E78.5 HYPERLIPIDEMIA WITH TARGET LDL LESS THAN 100: ICD-10-CM

## 2021-01-25 DIAGNOSIS — Z76.0 MEDICATION REFILL: ICD-10-CM

## 2021-01-25 RX ORDER — ATORVASTATIN CALCIUM 20 MG/1
TABLET, FILM COATED ORAL
Qty: 30 TABLET | Refills: 0 | Status: SHIPPED | OUTPATIENT
Start: 2021-01-25 | End: 2021-02-25 | Stop reason: SDUPTHER

## 2021-01-25 RX ORDER — ATORVASTATIN CALCIUM 20 MG/1
TABLET, FILM COATED ORAL
Qty: 30 TABLET | Refills: 2 | OUTPATIENT
Start: 2021-01-25

## 2021-01-25 RX ORDER — CLONIDINE HYDROCHLORIDE 0.1 MG/1
TABLET ORAL
Qty: 180 TABLET | Refills: 1 | OUTPATIENT
Start: 2021-01-25

## 2021-01-25 RX ORDER — CLONIDINE HYDROCHLORIDE 0.1 MG/1
TABLET ORAL
Qty: 60 TABLET | Refills: 0 | Status: SHIPPED | OUTPATIENT
Start: 2021-01-25 | End: 2021-02-25 | Stop reason: SDUPTHER

## 2021-01-25 NOTE — TELEPHONE ENCOUNTER
Requested Prescriptions     Pending Prescriptions Disp Refills    cloNIDine (CATAPRES) 0.1 MG tablet 180 tablet 1     Sig: take 1 tablet by mouth twice a day    atorvastatin (LIPITOR) 20 MG tablet 30 tablet 2     Sig: take 1 tablet by mouth once daily

## 2021-01-28 DIAGNOSIS — C61 PROSTATE CANCER (HCC): ICD-10-CM

## 2021-01-28 LAB
ANION GAP SERPL CALCULATED.3IONS-SCNC: 21 MEQ/L (ref 9–15)
BUN BLDV-MCNC: 13 MG/DL (ref 8–23)
CALCIUM SERPL-MCNC: 9.8 MG/DL (ref 8.5–9.9)
CHLORIDE BLD-SCNC: 101 MEQ/L (ref 95–107)
CO2: 21 MEQ/L (ref 20–31)
CREAT SERPL-MCNC: 1.18 MG/DL (ref 0.7–1.2)
GFR AFRICAN AMERICAN: >60
GFR NON-AFRICAN AMERICAN: >60
GLUCOSE BLD-MCNC: 79 MG/DL (ref 70–99)
HBA1C MFR BLD: 8 % (ref 4.8–5.9)
POTASSIUM SERPL-SCNC: 4.2 MEQ/L (ref 3.4–4.9)
PROSTATE SPECIFIC ANTIGEN: 0.25 NG/ML (ref 0–6.22)
SODIUM BLD-SCNC: 143 MEQ/L (ref 135–144)

## 2021-02-08 ENCOUNTER — OFFICE VISIT (OUTPATIENT)
Dept: UROLOGY | Age: 76
End: 2021-02-08
Payer: COMMERCIAL

## 2021-02-08 VITALS
BODY MASS INDEX: 27.7 KG/M2 | HEIGHT: 69 IN | HEART RATE: 51 BPM | DIASTOLIC BLOOD PRESSURE: 70 MMHG | SYSTOLIC BLOOD PRESSURE: 132 MMHG | WEIGHT: 187 LBS

## 2021-02-08 DIAGNOSIS — C61 PROSTATE CANCER (HCC): Primary | ICD-10-CM

## 2021-02-08 PROCEDURE — 4040F PNEUMOC VAC/ADMIN/RCVD: CPT | Performed by: UROLOGY

## 2021-02-08 PROCEDURE — 99213 OFFICE O/P EST LOW 20 MIN: CPT | Performed by: UROLOGY

## 2021-02-08 PROCEDURE — G8417 CALC BMI ABV UP PARAM F/U: HCPCS | Performed by: UROLOGY

## 2021-02-08 PROCEDURE — G8484 FLU IMMUNIZE NO ADMIN: HCPCS | Performed by: UROLOGY

## 2021-02-08 PROCEDURE — 3017F COLORECTAL CA SCREEN DOC REV: CPT | Performed by: UROLOGY

## 2021-02-08 PROCEDURE — 1036F TOBACCO NON-USER: CPT | Performed by: UROLOGY

## 2021-02-08 PROCEDURE — G8427 DOCREV CUR MEDS BY ELIG CLIN: HCPCS | Performed by: UROLOGY

## 2021-02-08 PROCEDURE — 1123F ACP DISCUSS/DSCN MKR DOCD: CPT | Performed by: UROLOGY

## 2021-02-08 NOTE — PROGRESS NOTES
[facility] EVALUATION NOTE                                                 H&P                                                                                                                                                 Reason for Visit  Prostate cancer check    History of Present Illness  67-year-old male treated for prostate cancer in 2016 with neoadjuvant and adjuvant hormonal therapy  Intermediate risk  [place] score 4+3  Initially presented with a PSA of 7.69      Urologic Review of Systems/Symptoms  No issues with voiding    Review of Systems  Hospitalization: No change in medical history review of systems no hospitalizations  All 14 categories of Review of Systems otherwise reviewed no other findings reported. Past Medical History:   Diagnosis Date    [facility] of [facility] 2 ([facility] 2018    Jim 3+4, Dr Ellen Ramirez  PSA 7.69 intermediate risk    Erectile dysfunction associated with type 2 diabetes mellitus ([facility]     Essential hypertension, benign     Intellectual disability     Low HDL (under 40)     Obesity (BMI 30-39. 9)     Pes planus of both feet 2015    Dr Halle Smith S/P colonoscopy with polypectomy 2014, 2019    Dr Verito Cantu Uncontrolled type 2 diabetes mellitus with microalbuminuria [facility]) 2007    Dr Tiburcio Fung    Unspecified deformity of ankle and foot, acquired     Vitamin B12 deficiency anemia 2016     Past Surgical History:   Procedure Laterality Date    COLONOSCOPY  03/20/2014    Polyp, Diverticulosis, Internal Hemorrhoids-Dr Sanders    COLONOSCOPY N/A 4/11/2019    COLORECTAL CANCER SCREENING, NOT HIGH RISK performed by Evelyn Peeryra MD at [facility].      Full upper/lower extractions    PROSTATE BIOPSY  1/2015    Dr Monica Willis History     Socioeconomic History    Marital status: Single     Spouse name: None    Number of children: 2    Years of education: None    Highest education level: None   Occupational History    As needed.  100 each 3    Lancets MISC 1 each by In Vitro route 3 times daily DX: E11.65, IDDM (please dispense covered brand) 100 each 6    amLODIPine-benazepril (LOTREL) 5-20 MG per capsule take 1 capsule by mouth once daily 30 capsule 1    Lancets (ONETOUCH DELICA PLUS [id number]) MISC use 1 LANCET to TEST BLOOD SUGAR three times a day 100 each 6    Insulin Pen Needle (B-D UF III MINI PEN NEEDLES) 31G X 5 MM MISC use as directed daily 100 each 3    insulin glargine (LANTUS SOLOSTAR) 100 UNIT/ML injection pen 10 units at bedtime 5 pen 3    metFORMIN (GLUCOPHAGE) 1000 MG tablet take 1 tablet by mouth twice a day with meals 180 tablet 1    vitamin D (RA VITAMIN D-3) 25 MCG (1000 UT) TABS tablet take 1 tablet by mouth once daily 30 tablet 5    aspirin (RA ASPIRIN EC) 81 MG EC tablet take 1 tablet by mouth once daily 90 tablet 1    SITagliptin (JANUVIA) 100 MG tablet take 1 tablet by mouth once daily 90 tablet 3    RA Alcohol Swabs 70 % PADS use as directed once daily 100 each 1    blood glucose test strips (ONE TOUCH ULTRA TEST) strip TEST three times a day 100 strip 3    glimepiride (AMARYL) 4 MG tablet take 1 tablet by mouth twice a day with meals 180 tablet 1    acetaminophen (TYLENOL) 500 MG tablet Take 1 tablet by mouth 4 times daily as needed for Pain 120 tablet 2    Cyanocobalamin ER (RA VITAMIN B-12 TR) 1000 MCG TBCR take 1 tablet by mouth once daily 90 tablet 2    insulin glargine (BASAGLAR KWIKPEN) 100 UNIT/ML injection pen inject 10 units subcutaneously at bedtime DO NOT USE SAME PEN FOR MORE THAN 28 DAYS 15 mL 3    hydrocortisone 2.5 % cream apply to affected area twice a day (DO NOT APPLY TO FACE)  0    sildenafil (VIAGRA) 100 MG tablet Take 1 tablet by mouth daily as needed for Erectile Dysfunction 5 tablet 11    Blood Glucose Monitoring Suppl (ONE TOUCH ULTRA MINI) w/Device KIT 1 kit by Does not apply route 3 times daily DX: E11.65, IDDM 1 kit 0    ONE TOUCH ULTRASOFT LANCETS MISC TEST once daily 100 each 3     No current facility-administered medications for this visit. Patient has no known allergies. All reviewed and verified by Dr Bear Bowling on today's visit    PSA   Date Value Ref Range Status   01/28/2021 0.25 0.00 - 6.22 ng/mL Final   08/12/2020 0.22 0.00 - 6.22 ng/mL Final   02/05/2020 0.24 0.00 - 6.22 ng/mL Final   02/05/2019 0.22 0.00 - 6.22 ng/mL Final   07/24/2018 0.28 0.00 - 6.22 ng/mL Final     Diagnostic Psa   Date Value Ref Range Status   12/04/2014 7.69 (H) 0.00 - 5.40 ng/mL Final   07/15/2014 6.31 (H) 0.00 - 5.40 ng/mL Final     No results found for this visit on 02/08/21. Physical Exam  Vitals:    02/08/21 1333   BP: 132/70   Pulse: 51   Weight: 187 lb (84.8 kg)   Height: 5' 8.5\" (1.74 m)     Constitutional: Normal.  Head and Neck: No evidence of lymphadenopathy  Cardiovascular: Normal rate, BP reviewed. Unchanged  Pulmonary/Chest: Normal respiratory effort not short of breath  Abdominal: Not distended. Unchanged  Urologic Exam  Prostate exam not indicated. Musculoskeletal: Ambulatory. Extremities: No edema  Neurological: No change  Skin: No lesions  Psychiatric: Alert oriented x3. Assessment/Medical Necessity-Decision Making  Prostate cancer  Intermediate risk  Treated with hormonal and radiation therapy  Current PSA stable at 0.25  Plan  PSA 1 year follow-up  Greater than 50% of 20 minutes spent consulting patient face-to-face  Orders Placed This Encounter   Procedures    PSA, Diagnostic     Standing Status:   Future     Standing Expiration Date:   2/8/2022     No orders of the defined types were placed in this encounter. Francis Brown MD       Please note this report has been partially produced using speech recognition software  And may cause contain errors related to that system including grammar, punctuation and spelling as well as words and phrases that may seem inappropriate. If there are questions or concerns please feel free to contact me to clarify.

## 2021-02-25 ENCOUNTER — OFFICE VISIT (OUTPATIENT)
Dept: ENDOCRINOLOGY | Age: 76
End: 2021-02-25
Payer: COMMERCIAL

## 2021-02-25 ENCOUNTER — OFFICE VISIT (OUTPATIENT)
Dept: FAMILY MEDICINE CLINIC | Age: 76
End: 2021-02-25
Payer: COMMERCIAL

## 2021-02-25 VITALS
SYSTOLIC BLOOD PRESSURE: 122 MMHG | HEIGHT: 67 IN | WEIGHT: 183 LBS | BODY MASS INDEX: 28.72 KG/M2 | OXYGEN SATURATION: 97 % | DIASTOLIC BLOOD PRESSURE: 69 MMHG | HEART RATE: 42 BPM

## 2021-02-25 VITALS
DIASTOLIC BLOOD PRESSURE: 62 MMHG | OXYGEN SATURATION: 100 % | SYSTOLIC BLOOD PRESSURE: 130 MMHG | BODY MASS INDEX: 28.54 KG/M2 | RESPIRATION RATE: 20 BRPM | WEIGHT: 182.2 LBS | HEART RATE: 40 BPM | TEMPERATURE: 96.9 F

## 2021-02-25 DIAGNOSIS — I49.9 IRREGULAR HEART RHYTHM: Chronic | ICD-10-CM

## 2021-02-25 DIAGNOSIS — D51.9 ANEMIA DUE TO VITAMIN B12 DEFICIENCY, UNSPECIFIED B12 DEFICIENCY TYPE: ICD-10-CM

## 2021-02-25 DIAGNOSIS — Z76.0 MEDICATION REFILL: ICD-10-CM

## 2021-02-25 DIAGNOSIS — E78.5 HYPERLIPIDEMIA WITH TARGET LDL LESS THAN 100: ICD-10-CM

## 2021-02-25 DIAGNOSIS — N18.31 STAGE 3A CHRONIC KIDNEY DISEASE (HCC): Chronic | ICD-10-CM

## 2021-02-25 DIAGNOSIS — R94.31 ABNORMAL EKG: Chronic | ICD-10-CM

## 2021-02-25 DIAGNOSIS — I10 ESSENTIAL HYPERTENSION, BENIGN: ICD-10-CM

## 2021-02-25 LAB
CHP ED QC CHECK: NORMAL
GLUCOSE BLD-MCNC: 226 MG/DL

## 2021-02-25 PROCEDURE — 1123F ACP DISCUSS/DSCN MKR DOCD: CPT | Performed by: INTERNAL MEDICINE

## 2021-02-25 PROCEDURE — G8427 DOCREV CUR MEDS BY ELIG CLIN: HCPCS | Performed by: INTERNAL MEDICINE

## 2021-02-25 PROCEDURE — 3052F HG A1C>EQUAL 8.0%<EQUAL 9.0%: CPT | Performed by: FAMILY MEDICINE

## 2021-02-25 PROCEDURE — 93000 ELECTROCARDIOGRAM COMPLETE: CPT | Performed by: FAMILY MEDICINE

## 2021-02-25 PROCEDURE — 3017F COLORECTAL CA SCREEN DOC REV: CPT | Performed by: INTERNAL MEDICINE

## 2021-02-25 PROCEDURE — 99214 OFFICE O/P EST MOD 30 MIN: CPT | Performed by: FAMILY MEDICINE

## 2021-02-25 PROCEDURE — 1036F TOBACCO NON-USER: CPT | Performed by: INTERNAL MEDICINE

## 2021-02-25 PROCEDURE — G8484 FLU IMMUNIZE NO ADMIN: HCPCS | Performed by: FAMILY MEDICINE

## 2021-02-25 PROCEDURE — 3017F COLORECTAL CA SCREEN DOC REV: CPT | Performed by: FAMILY MEDICINE

## 2021-02-25 PROCEDURE — G8427 DOCREV CUR MEDS BY ELIG CLIN: HCPCS | Performed by: FAMILY MEDICINE

## 2021-02-25 PROCEDURE — G8484 FLU IMMUNIZE NO ADMIN: HCPCS | Performed by: INTERNAL MEDICINE

## 2021-02-25 PROCEDURE — 2022F DILAT RTA XM EVC RTNOPTHY: CPT | Performed by: FAMILY MEDICINE

## 2021-02-25 PROCEDURE — 82962 GLUCOSE BLOOD TEST: CPT | Performed by: INTERNAL MEDICINE

## 2021-02-25 PROCEDURE — 1036F TOBACCO NON-USER: CPT | Performed by: FAMILY MEDICINE

## 2021-02-25 PROCEDURE — 3052F HG A1C>EQUAL 8.0%<EQUAL 9.0%: CPT | Performed by: INTERNAL MEDICINE

## 2021-02-25 PROCEDURE — 99213 OFFICE O/P EST LOW 20 MIN: CPT | Performed by: INTERNAL MEDICINE

## 2021-02-25 PROCEDURE — 1123F ACP DISCUSS/DSCN MKR DOCD: CPT | Performed by: FAMILY MEDICINE

## 2021-02-25 PROCEDURE — G8417 CALC BMI ABV UP PARAM F/U: HCPCS | Performed by: FAMILY MEDICINE

## 2021-02-25 PROCEDURE — 2022F DILAT RTA XM EVC RTNOPTHY: CPT | Performed by: INTERNAL MEDICINE

## 2021-02-25 PROCEDURE — 4040F PNEUMOC VAC/ADMIN/RCVD: CPT | Performed by: FAMILY MEDICINE

## 2021-02-25 PROCEDURE — 4040F PNEUMOC VAC/ADMIN/RCVD: CPT | Performed by: INTERNAL MEDICINE

## 2021-02-25 PROCEDURE — G8417 CALC BMI ABV UP PARAM F/U: HCPCS | Performed by: INTERNAL MEDICINE

## 2021-02-25 RX ORDER — ATORVASTATIN CALCIUM 20 MG/1
TABLET, FILM COATED ORAL
Qty: 90 TABLET | Refills: 4 | Status: SHIPPED | OUTPATIENT
Start: 2021-02-25 | End: 2022-03-10 | Stop reason: SDUPTHER

## 2021-02-25 RX ORDER — CLONIDINE HYDROCHLORIDE 0.1 MG/1
TABLET ORAL
Qty: 180 TABLET | Refills: 4 | Status: SHIPPED | OUTPATIENT
Start: 2021-02-25 | End: 2022-03-10 | Stop reason: SDUPTHER

## 2021-02-25 RX ORDER — PEN NEEDLE, DIABETIC 31 GX5/16"
NEEDLE, DISPOSABLE MISCELLANEOUS
Qty: 100 EACH | Refills: 3 | Status: SHIPPED | OUTPATIENT
Start: 2021-02-25 | End: 2021-08-26

## 2021-02-25 RX ORDER — PSYLLIUM HUSK 3.4 G/7G
POWDER ORAL
Qty: 90 TABLET | Refills: 4 | Status: SHIPPED | OUTPATIENT
Start: 2021-02-25 | End: 2022-03-10 | Stop reason: SDUPTHER

## 2021-02-25 RX ORDER — INSULIN GLARGINE 100 [IU]/ML
INJECTION, SOLUTION SUBCUTANEOUS
Qty: 5 PEN | Refills: 3 | Status: SHIPPED | OUTPATIENT
Start: 2021-02-25 | End: 2021-08-26

## 2021-02-25 RX ORDER — AMLODIPINE BESYLATE AND BENAZEPRIL HYDROCHLORIDE 5; 20 MG/1; MG/1
CAPSULE ORAL
Qty: 90 CAPSULE | Refills: 4 | Status: SHIPPED | OUTPATIENT
Start: 2021-02-25 | End: 2022-03-10 | Stop reason: SDUPTHER

## 2021-02-25 RX ORDER — AVOBENZONE, HOMOSALATE, OCTISALATE, OCTOCRYLENE 30; 100; 50; 25 MG/ML; MG/ML; MG/ML; MG/ML
SPRAY TOPICAL
Qty: 90 TABLET | Refills: 4 | Status: SHIPPED | OUTPATIENT
Start: 2021-02-25 | End: 2022-03-10 | Stop reason: SDUPTHER

## 2021-02-25 ASSESSMENT — PATIENT HEALTH QUESTIONNAIRE - PHQ9
SUM OF ALL RESPONSES TO PHQ QUESTIONS 1-9: 0
SUM OF ALL RESPONSES TO PHQ9 QUESTIONS 1 & 2: 0
SUM OF ALL RESPONSES TO PHQ QUESTIONS 1-9: 0

## 2021-02-25 NOTE — PROGRESS NOTES
ekg  Subjective  Mana Quick., 77 y.o. male presents today with:  Chief Complaint   Patient presents with    Diabetes     New Patient           HPI    Patient is here for f/u HTN. Is compliant with meds and has no side effects from them. Devin Hernandez to eat healthy. Exercises regularly has no chest pain, shortness of breath, palpitations or edema. Patient is here for DM f/u. Sugars have been moderately well-controlled and patient has not been experiencing hyper- or hypoglycemic symptoms. Compliance with meds is good and there are no side effects. Patient exercises occasionally and tries to eat healthy. Is up to date on foot and eye exams and immunizations. H/O prostate 2015 - treatment completed (radiation). Followed by Dr. Andres Shields. No other questions and or concerns for today's visit            Past Medical History:   Diagnosis Date    Abnormal EKG 2/25/2021    Adenocarcinoma of prostate, stage 2 ([facility] 2018    Jim 3+4, Dr Andres Shields  PSA 7.69 intermediate risk    Erectile dysfunction associated with type 2 diabetes mellitus ([facility]     Essential hypertension, benign     Intellectual disability     Irregular heart rhythm 2/25/2021    Low HDL (under 40)     Obesity (BMI 30-39. 9)     Obesity (BMI 30-39. 9)     Pes planus of both feet 2015    Dr Pal Almaraz S/P colonoscopy with polypectomy 2014, 2019    Dr Yamel Devries 3a chronic kidney disease 2/25/2021    Uncontrolled type 2 diabetes mellitus with microalbuminuria [facility]) 2007    Dr Chawla Nose    Unspecified deformity of ankle and foot, acquired     Vitamin B12 deficiency anemia 2016     Past Surgical History:   Procedure Laterality Date    COLONOSCOPY  03/20/2014    Polyp, Diverticulosis, Internal Hemorrhoids-Dr Sanders    COLONOSCOPY N/A 4/11/2019    COLORECTAL CANCER SCREENING, NOT HIGH RISK performed by Jenn Esteves MD at [place] [address].      Full upper/lower extractions    PROSTATE BIOPSY  1/2015    Dr Andres Shields Social History     Socioeconomic History    Marital status: Single     Spouse name: Not on file    Number of children: 2    Years of education: Not on file    Highest education level: Not on file   Occupational History    Occupation: Fast food, others, SSI   Social Needs    Financial resource strain: Not on file    Food insecurity     Worry: Not on file     Inability: Not on file   [employer] needs     Medical: Not on file     Non-medical: Not on file   Tobacco Use    Smoking status: Former Smoker     Packs/day: 0.50     Years: 53.00     Pack years: 26.50     Types: Cigarettes     Start date:      Quit date:      Years since quittin.1    Smokeless tobacco: Never Used   Substance and Sexual Activity    Alcohol use: Yes     Comment: occasionally, beer- recovering alcoholic    Drug use: No    Sexual activity: Yes     Partners: Female   Lifestyle    Physical activity     Days per week: Not on file     Minutes per session: Not on file    Stress: Not on file   Relationships    Social connections     Talks on phone: Not on file     Gets together: Not on file     Attends Mandaen service: Not on file     Active member of club or organization: Not on file     Attends meetings of clubs or organizations: Not on file     Relationship status: Not on file    Intimate partner violence     Fear of current or ex partner: Not on file     Emotionally abused: Not on file     Physically abused: Not on file     Forced sexual activity: Not on file   Other Topics Concern    Not on file   Social History Narrative    Born in [facility] [place], one of 6    Lives in a house in [place] with girlfriend    2 sons in [place], keeps in touch with one of them    Grands: many    Worked \"everywhere\", on disability         Hobbies: video games     Family History   Problem Relation Age of Onset    Stroke Mother         dec age [de-identified]    Cancer Brother         liver, dec age 79    Diabetes Father     Cancer Father prostate     No Known Allergies  Current Outpatient Medications   Medication Sig Dispense Refill    insulin glargine (LANTUS SOLOSTAR) 100 UNIT/ML injection pen 10 units at bedtime 5 pen 3    metFORMIN (GLUCOPHAGE) 1000 MG tablet take 1 tablet by mouth twice a day with meals 180 tablet 1    SITagliptin (JANUVIA) 100 MG tablet take 1 tablet by mouth once daily 90 tablet 3    cloNIDine (CATAPRES) 0.1 MG tablet take 1 tablet by mouth twice a day 180 tablet 4    atorvastatin (LIPITOR) 20 MG tablet take 1 tablet by mouth once daily 90 tablet 4    amLODIPine-benazepril (LOTREL) 5-20 MG per capsule take 1 capsule by mouth once daily 90 capsule 4    vitamin D (RA VITAMIN D-3) 25 MCG (1000 UT) TABS tablet take 1 tablet by mouth once daily 90 tablet 4    Cyanocobalamin ER ( VITAMIN B-12 TR) 1000 MCG TBCR take 1 tablet by mouth once daily 90 tablet 4    blood glucose test strips (ASCENSIA AUTODISC VI;ONE TOUCH ULTRA TEST VI) strip 1 each by In Vitro route daily As needed.  100 each 3    Lancets MISC 1 each by In Vitro route 3 times daily DX: E11.65, IDDM (please dispense covered brand) 100 each 6    Lancets (ONETOUCH DELICA PLUS [id number]) MISC use 1 LANCET to TEST BLOOD SUGAR three times a day 100 each 6    aspirin (RA ASPIRIN EC) 81 MG EC tablet take 1 tablet by mouth once daily 90 tablet 1    RA Alcohol Swabs 70 % PADS use as directed once daily 100 each 1    blood glucose test strips (ONE TOUCH ULTRA TEST) strip TEST three times a day 100 strip 3    glimepiride (AMARYL) 4 MG tablet take 1 tablet by mouth twice a day with meals 180 tablet 1    insulin glargine (BASAGLAR KWIKPEN) 100 UNIT/ML injection pen inject 10 units subcutaneously at bedtime DO NOT USE SAME PEN FOR MORE THAN 28 DAYS 15 mL 3    hydrocortisone 2.5 % cream apply to affected area twice a day (DO NOT APPLY TO FACE)  0    sildenafil (VIAGRA) 100 MG tablet Take 1 tablet by mouth daily as needed for Erectile Dysfunction 5 tablet 11    Blood Glucose Monitoring Suppl (ONE TOUCH ULTRA MINI) w/Device KIT 1 kit by Does not apply route 3 times daily DX: E11.65, IDDM 1 kit 0    ONE TOUCH ULTRASOFT LANCETS MISC TEST once daily 100 each 3    Insulin Pen Needle (B-D UF III MINI PEN NEEDLES) 31G X 5 MM MISC use as directed daily (Patient not taking: Reported on 2/25/2021) 100 each 3    acetaminophen (TYLENOL) 500 MG tablet Take 1 tablet by mouth 4 times daily as needed for Pain (Patient not taking: Reported on 2/25/2021) 120 tablet 2     No current facility-administered medications for this visit. PMH, Surgical Hx, Family Hx, and Social Hxreviewed and updated. Health Maintenance reviewed. Objective    Vitals:    02/25/21 1336   BP: 130/62   Site: Right Upper Arm   Position: Sitting   Cuff Size: Medium Adult   Pulse: (!) 40   Resp: 20   Temp: 96.9 °F (36.1 °C)   TempSrc: Tympanic   SpO2: 100%   Weight: 182 lb 3.2 oz (82.6 kg)        Physical Exam  Constitutional:       General: He is not in acute distress. Appearance: He is well-developed. HENT:      Head: Normocephalic and atraumatic. Eyes:      General: No scleral icterus. Conjunctiva/sclera: Conjunctivae normal.   Neck:      Musculoskeletal: Normal range of motion and neck supple. Cardiovascular:      Rate and Rhythm: Normal rate. Rhythm regularly irregular. Pulses:           Carotid pulses are 2+ on the right side and 2+ on the left side. Heart sounds: Normal heart sounds. Pulmonary:      Effort: Pulmonary effort is normal. No respiratory distress. Breath sounds: Normal breath sounds. No wheezing or rales. Abdominal:      General: Bowel sounds are normal. There is no distension. Palpations: Abdomen is soft. There is no mass. Tenderness: There is no abdominal tenderness. There is no guarding or rebound. Musculoskeletal:      Right lower leg: No edema. Left lower leg: No edema. Lymphadenopathy:      Cervical: No cervical adenopathy.    Skin: General: Skin is warm and dry. Neurological:      Mental Status: He is alert and oriented to person, place, and time. Lab Results   Component Value Date    LABA1C 8.0 (H) 01/28/2021    LABA1C 7.6 08/25/2020    LABA1C 7.4 (H) 05/04/2020     Lab Results   Component Value Date    LABMICR 1.50 02/05/2020    CREATININE 1.18 01/28/2021     Lab Results   Component Value Date    ALT 14 09/20/2018    AST 13 09/20/2018     Lab Results   Component Value Date    CHOL 127 05/04/2020    TRIG 90 05/04/2020    HDL 33 (L) 05/04/2020    LDLCALC 76 05/04/2020      EKG with short HI interval.  Heart rate 83. Frequent ventricular ectopy. Normal sinus rhythm without acute ST/T wave changes  Assessment & Plan   Visit Diagnoses and Associated Orders     Uncontrolled type 2 diabetes mellitus with microalbuminuria ([facility]    -  Primary    Worse, fair control. Reviewed meds. Follow closely. Essential hypertension, benign        Stable and well-controlled on current meds. amLODIPine-benazepril (LOTREL) 5-20 MG per capsule [[id number]]           Hyperlipidemia with target LDL less than 100        Follow labs    atorvastatin (LIPITOR) 20 MG tablet [[id number]]           Anemia due to vitamin B12 deficiency, unspecified B12 deficiency type        Follow labs    Vitamin B12 [[id number] Custom]   - Future Order    CBC With Auto Differential [[id number] Custom]   - Future Order    Cyanocobalamin ER (RA VITAMIN B-12 TR) 1000 MCG TBCR [[id number]]           Stage 3a chronic kidney disease        Follow labs    Basic Metabolic Panel [[id number] Custom]   - Future Order         Medication refill        atorvastatin (LIPITOR) 20 MG tablet [[id number]]      amLODIPine-benazepril (LOTREL) 5-20 MG per capsule [02021]      vitamin D (RA VITAMIN D-3) 25 MCG (1000 UT) TABS tablet [[id number]]           Irregular heart rhythm        Referred to cardiology. Patient engagement limited.   Agrees to have evaluation    EKG 12 Lead [[id number] Custom]   - Future Order    EKG 12 Lead [[id number] Custom]      External Referral to Cardiology [[id number] Custom]           Abnormal EKG        See above. External Referral to Cardiology [[id number] Custom]           ORDERS WITHOUT AN ASSOCIATED DIAGNOSIS    cloNIDine (CATAPRES) 0.1 MG tablet [1755]            Patient appears disinclined to engage with medical system. However, does agree to have further cardiology evaluation. Will address incrementally his concerns about his health and his care.   Orders Placed This Encounter   Procedures    Vitamin B12     Standing Status:   Future     Standing Expiration Date:   2/25/2022    CBC With Auto Differential     Standing Status:   Future     Standing Expiration Date:   2/25/2022    Basic Metabolic Panel     Standing Status:   Future     Standing Expiration Date:   2/25/2022    External Referral to Cardiology     Referral Priority:   Routine     Referral Type:   Eval and Treat     Referral Reason:   Specialty Services Required     Requested Specialty:   Cardiology     Number of Visits Requested:   1    EKG 12 Lead     Standing Status:   Future     Number of Occurrences:   1     Standing Expiration Date:   3/25/2021     Order Specific Question:   Reason for Exam?     Answer:   Irregular heart rate     Orders Placed This Encounter   Medications    cloNIDine (CATAPRES) 0.1 MG tablet     Sig: take 1 tablet by mouth twice a day     Dispense:  180 tablet     Refill:  4    atorvastatin (LIPITOR) 20 MG tablet     Sig: take 1 tablet by mouth once daily     Dispense:  90 tablet     Refill:  4    amLODIPine-benazepril (LOTREL) 5-20 MG per capsule     Sig: take 1 capsule by mouth once daily     Dispense:  90 capsule     Refill:  4    vitamin D (RA VITAMIN D-3) 25 MCG (1000 UT) TABS tablet     Sig: take 1 tablet by mouth once daily     Dispense:  90 tablet     Refill:  4    Cyanocobalamin ER (RA VITAMIN B-12 TR) 1000 MCG TBCR     Sig: take 1 tablet by mouth once daily     Dispense:  90 tablet     Refill:  4 Medications Discontinued During This Encounter   Medication Reason    Cyanocobalamin ER (RA VITAMIN B-12 TR) 1000 MCG TBCR REORDER    vitamin D (RA VITAMIN D-3) 25 MCG (1000 UT) TABS tablet REORDER    amLODIPine-benazepril (LOTREL) 5-20 MG per capsule REORDER    cloNIDine (CATAPRES) 0.1 MG tablet REORDER    atorvastatin (LIPITOR) 20 MG tablet REORDER     Return in about 6 months (around 8/25/2021) for DM, HTN - OV. Controlled Substance Monitoring:    Acute and Chronic Pain Monitoring:   No flowsheet data found.         Gill Berger MD

## 2021-02-25 NOTE — PROGRESS NOTES
Subjective:      Patient ID: Jerry Blackwell is a 77 y.o. male. Follow-up on type 2 diabetes A1c has gone up from 7.6-8 testing periodically currently on Lantus 10 units at bedtime Metformin glimepiride and Januvia glucose was 226 GFR more than 60  Diabetes  He presents for his follow-up diabetic visit. He has type 2 diabetes mellitus. There are no diabetic associated symptoms. There are no hypoglycemic complications. Symptoms are worsening. Diabetic complications include impotence and nephropathy. Current diabetic treatments: Lantus metformin glimepiride Januvia. His overall blood glucose range is 180-200 mg/dl. (Lab Results       Component                Value               Date                       LABA1C                   8.0 (H)             01/28/2021              ) An ACE inhibitor/angiotensin II receptor blocker is being taken. Results for Mar Guadalupe (MRN [id number]) as of 2/25/2021 13:11   Ref.  Range 8/25/2020 14:04 8/25/2020 14:11 1/28/2021 15:04 1/28/2021 15:08 2/25/2021 13:06   Sodium Latest Ref Range: 135 - 144 mEq/L    143    Potassium Latest Ref Range: 3.4 - 4.9 mEq/L    4.2    Chloride Latest Ref Range: 95 - 107 mEq/L    101    CO2 Latest Ref Range: 20 - 31 mEq/L    21    BUN Latest Ref Range: 8 - 23 mg/dL    13    Creatinine Latest Ref Range: 0.70 - 1.20 mg/dL    1.18    Anion Gap Latest Ref Range: 9 - 15 mEq/L    21 (H)    GFR Non- Latest Ref Range: >60     >60.0    GFR African American Latest Ref Range: >60     >60.0    Glucose Latest Units: mg/dL 224   79 226   Calcium Latest Ref Range: 8.5 - 9.9 mg/dL    9.8    Hemoglobin A1C Latest Ref Range: 4.8 - 5.9 %  7.6  8.0 (H)      Patient Active Problem List   Diagnosis    Essential hypertension, benign    Hyperlipidemia with target LDL less than 100    Pes planus    Normocytic anemia    Erectile dysfunction associated with type 2 diabetes mellitus (HCC)    Knee pain    Anemia due to vitamin B12 deficiency    Hypercalcemia    Uncontrolled type 2 diabetes mellitus with microalbuminuria (HCC)    Adenomatous polyp of descending colon    History of prostate cancer    Stage 3a chronic kidney disease    Irregular heart rhythm    Abnormal EKG     No Known Allergies    Current Outpatient Medications:     insulin glargine (LANTUS SOLOSTAR) 100 UNIT/ML injection pen, 10 units at bedtime, Disp: 5 pen, Rfl: 3    metFORMIN (GLUCOPHAGE) 1000 MG tablet, take 1 tablet by mouth twice a day with meals, Disp: 180 tablet, Rfl: 1    SITagliptin (JANUVIA) 100 MG tablet, take 1 tablet by mouth once daily, Disp: 90 tablet, Rfl: 3    Insulin Pen Needle (B-D UF III MINI PEN NEEDLES) 31G X 5 MM MISC, use as directed daily (Patient not taking: Reported on 2/25/2021), Disp: 100 each, Rfl: 3    blood glucose test strips (ASCENSIA AUTODISC VI;ONE TOUCH ULTRA TEST VI) strip, 1 each by In Vitro route daily As needed. , Disp: 100 each, Rfl: 3    Lancets MISC, 1 each by In Vitro route 3 times daily DX: E11.65, IDDM (please dispense covered brand), Disp: 100 each, Rfl: 6    Lancets (ONETOUCH DELICA PLUS [id number]) MISC, use 1 LANCET to TEST BLOOD SUGAR three times a day, Disp: 100 each, Rfl: 6    aspirin (RA ASPIRIN EC) 81 MG EC tablet, take 1 tablet by mouth once daily, Disp: 90 tablet, Rfl: 1    RA Alcohol Swabs 70 % PADS, use as directed once daily, Disp: 100 each, Rfl: 1    blood glucose test strips (ONE TOUCH ULTRA TEST) strip, TEST three times a day, Disp: 100 strip, Rfl: 3    glimepiride (AMARYL) 4 MG tablet, take 1 tablet by mouth twice a day with meals, Disp: 180 tablet, Rfl: 1    acetaminophen (TYLENOL) 500 MG tablet, Take 1 tablet by mouth 4 times daily as needed for Pain (Patient not taking: Reported on 2/25/2021), Disp: 120 tablet, Rfl: 2    insulin glargine (BASAGLAR KWIKPEN) 100 UNIT/ML injection pen, inject 10 units subcutaneously at bedtime DO NOT USE SAME PEN FOR MORE THAN 28 DAYS, Disp: 15 mL, Rfl: 3    hydrocortisone 2.5 % cream, apply to affected area twice a day (DO NOT APPLY TO FACE), Disp: , Rfl: 0    sildenafil (VIAGRA) 100 MG tablet, Take 1 tablet by mouth daily as needed for Erectile Dysfunction, Disp: 5 tablet, Rfl: 11    Blood Glucose Monitoring Suppl (ONE TOUCH ULTRA MINI) w/Device KIT, 1 kit by Does not apply route 3 times daily DX: E11.65, IDDM, Disp: 1 kit, Rfl: 0    ONE TOUCH ULTRASOFT LANCETS MISC, TEST once daily, Disp: 100 each, Rfl: 3    cloNIDine (CATAPRES) 0.1 MG tablet, take 1 tablet by mouth twice a day, Disp: 180 tablet, Rfl: 4    atorvastatin (LIPITOR) 20 MG tablet, take 1 tablet by mouth once daily, Disp: 90 tablet, Rfl: 4    amLODIPine-benazepril (LOTREL) 5-20 MG per capsule, take 1 capsule by mouth once daily, Disp: 90 capsule, Rfl: 4    vitamin D (RA VITAMIN D-3) 25 MCG (1000 UT) TABS tablet, take 1 tablet by mouth once daily, Disp: 90 tablet, Rfl: 4    Cyanocobalamin ER (RA VITAMIN B-12 TR) 1000 MCG TBCR, take 1 tablet by mouth once daily, Disp: 90 tablet, Rfl: 4    Review of Systems   Genitourinary: Positive for impotence. Vitals:    02/25/21 1306   BP: 122/69   Pulse: (!) 42   SpO2: 97%   Weight: 183 lb (83 kg)   Height: 5' 7\" (1.702 m)       Objective:   Physical Exam  Vitals signs reviewed. Constitutional:       Appearance: Normal appearance. HENT:      Head: Normocephalic and atraumatic. Right Ear: External ear normal.      Left Ear: External ear normal.      Nose: Nose normal.   Neck:      Musculoskeletal: Normal range of motion and neck supple. Cardiovascular:      Rate and Rhythm: Normal rate. Musculoskeletal: Normal range of motion. Feet:    Neurological:      General: No focal deficit present. Mental Status: He is alert. Psychiatric:         Mood and Affect: Mood normal.         Assessment:       Diagnosis Orders   1.  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  Medication refill  metFORMIN (GLUCOPHAGE) 1000 MG tablet           Plan:      Orders Placed This Encounter   Medications    insulin glargine (LANTUS SOLOSTAR) 100 UNIT/ML injection pen     Sig: 10 units at bedtime     Dispense:  5 pen     Refill:  3    metFORMIN (GLUCOPHAGE) 1000 MG tablet     Sig: take 1 tablet by mouth twice a day with meals     Dispense:  180 tablet     Refill:  1    SITagliptin (JANUVIA) 100 MG tablet     Sig: take 1 tablet by mouth once daily     Dispense:  90 tablet     Refill:  3    Insulin Pen Needle (B-D UF III MINI PEN NEEDLES) 31G X 5 MM MISC     Sig: use as directed daily     Dispense:  100 each     Refill:  3     Continue glimepiride 4 mg twice daily  Continue daily Lantus 10 units at bedtime A1c goal of 7-8        Harvey Sanabria MD

## 2021-03-13 RX ORDER — GLIMEPIRIDE 4 MG/1
TABLET ORAL
Qty: 180 TABLET | Refills: 1 | Status: SHIPPED | OUTPATIENT
Start: 2021-03-13 | End: 2021-09-27 | Stop reason: SDUPTHER

## 2021-05-04 ENCOUNTER — HOSPITAL ENCOUNTER (OUTPATIENT)
Dept: SLEEP CENTER | Age: 76
Discharge: HOME | End: 2021-05-06
Payer: COMMERCIAL

## 2021-05-04 PROCEDURE — 95806 SLEEP STUDY UNATT&RESP EFFT: CPT

## 2021-05-04 PROCEDURE — 95806 SLEEP STUDY UNATT&RESP EFFT: CPT | Performed by: INTERNAL MEDICINE

## 2021-08-23 DIAGNOSIS — Z76.0 MEDICATION REFILL: ICD-10-CM

## 2021-08-23 NOTE — TELEPHONE ENCOUNTER
Patient seen and examined  Complains of pain as expected    /65   Pulse (!) 105   Temp 36.6 °C (97.8 °F) (Temporal)   Resp 16   Ht 1.829 m (6')   Wt 86.2 kg (190 lb)   SpO2 94%     Recent Labs     04/24/21  0432 04/25/21  0548 04/26/21  0721   WBC 8.0 6.4 8.8   RBC 3.20* 3.14* 3.35*   HEMOGLOBIN 9.1* 9.1* 9.6*   HEMATOCRIT 30.4* 31.3* 31.2*   MCV 95.0 99.7* 93.1   MCH 28.4 29.0 28.7   MCHC 29.9* 29.1* 30.8*   RDW 55.8* 57.4* 54.4*   PLATELETCT 280 273 306   MPV 9.0 8.8* 9.2       No acute distress  Dressing clean dry and intact  Incision well healing  Neurovascularly intact    POD# 3 left 5th ray    Plan:  DVT Prophylaxis- TEDS/SCDs, eliquis  Weight Bearing Status- gentle WB through heel okay  PT/OT  Antibiotics: per primary  Case Coordination           patient requesting medication refill.  Please approve or deny this request.    Rx requested:  Requested Prescriptions     Pending Prescriptions Disp Refills    metFORMIN (GLUCOPHAGE) 1000 MG tablet 180 tablet 1     Sig: take 1 tablet by mouth twice a day with meals         Last Office Visit:   2/25/2021      Next Visit Date:  Future Appointments   Date Time Provider Leonides Cohen   8/26/2021  1:00 PM Rosemary Rodriguez MD [address] [place] [address]   8/26/2021  2:30 PM Karen Mcclure MD [facility]   2/8/2022  1:30 PM Portillo Low MD [facility]

## 2021-08-26 ENCOUNTER — OFFICE VISIT (OUTPATIENT)
Dept: FAMILY MEDICINE CLINIC | Age: 76
End: 2021-08-26
Payer: COMMERCIAL

## 2021-08-26 ENCOUNTER — OFFICE VISIT (OUTPATIENT)
Dept: ENDOCRINOLOGY | Age: 76
End: 2021-08-26
Payer: COMMERCIAL

## 2021-08-26 VITALS
HEIGHT: 69 IN | BODY MASS INDEX: 26.66 KG/M2 | OXYGEN SATURATION: 97 % | DIASTOLIC BLOOD PRESSURE: 70 MMHG | HEART RATE: 76 BPM | SYSTOLIC BLOOD PRESSURE: 130 MMHG | RESPIRATION RATE: 16 BRPM | TEMPERATURE: 97.1 F | WEIGHT: 180 LBS

## 2021-08-26 VITALS
SYSTOLIC BLOOD PRESSURE: 133 MMHG | BODY MASS INDEX: 26.51 KG/M2 | DIASTOLIC BLOOD PRESSURE: 69 MMHG | WEIGHT: 179 LBS | OXYGEN SATURATION: 96 % | HEIGHT: 69 IN | HEART RATE: 52 BPM

## 2021-08-26 DIAGNOSIS — N18.31 STAGE 3A CHRONIC KIDNEY DISEASE (HCC): Chronic | ICD-10-CM

## 2021-08-26 DIAGNOSIS — I10 ESSENTIAL HYPERTENSION, BENIGN: Primary | ICD-10-CM

## 2021-08-26 DIAGNOSIS — Z00.00 ROUTINE GENERAL MEDICAL EXAMINATION AT A HEALTH CARE FACILITY: Primary | ICD-10-CM

## 2021-08-26 PROBLEM — I49.3 FREQUENT PVCS: Chronic | Status: ACTIVE | Noted: 2021-02-25

## 2021-08-26 LAB
CHP ED QC CHECK: NORMAL
GLUCOSE BLD-MCNC: 223 MG/DL

## 2021-08-26 PROCEDURE — 1123F ACP DISCUSS/DSCN MKR DOCD: CPT | Performed by: INTERNAL MEDICINE

## 2021-08-26 PROCEDURE — 82962 GLUCOSE BLOOD TEST: CPT | Performed by: INTERNAL MEDICINE

## 2021-08-26 PROCEDURE — 2022F DILAT RTA XM EVC RTNOPTHY: CPT | Performed by: INTERNAL MEDICINE

## 2021-08-26 PROCEDURE — 2022F DILAT RTA XM EVC RTNOPTHY: CPT | Performed by: FAMILY MEDICINE

## 2021-08-26 PROCEDURE — G8427 DOCREV CUR MEDS BY ELIG CLIN: HCPCS | Performed by: INTERNAL MEDICINE

## 2021-08-26 PROCEDURE — 4040F PNEUMOC VAC/ADMIN/RCVD: CPT | Performed by: INTERNAL MEDICINE

## 2021-08-26 PROCEDURE — 99214 OFFICE O/P EST MOD 30 MIN: CPT | Performed by: FAMILY MEDICINE

## 2021-08-26 PROCEDURE — 99213 OFFICE O/P EST LOW 20 MIN: CPT | Performed by: INTERNAL MEDICINE

## 2021-08-26 PROCEDURE — 3017F COLORECTAL CA SCREEN DOC REV: CPT | Performed by: FAMILY MEDICINE

## 2021-08-26 PROCEDURE — 1036F TOBACCO NON-USER: CPT | Performed by: INTERNAL MEDICINE

## 2021-08-26 PROCEDURE — G8427 DOCREV CUR MEDS BY ELIG CLIN: HCPCS | Performed by: FAMILY MEDICINE

## 2021-08-26 PROCEDURE — 1036F TOBACCO NON-USER: CPT | Performed by: FAMILY MEDICINE

## 2021-08-26 PROCEDURE — 4040F PNEUMOC VAC/ADMIN/RCVD: CPT | Performed by: FAMILY MEDICINE

## 2021-08-26 PROCEDURE — 3051F HG A1C>EQUAL 7.0%<8.0%: CPT | Performed by: INTERNAL MEDICINE

## 2021-08-26 PROCEDURE — 1123F ACP DISCUSS/DSCN MKR DOCD: CPT | Performed by: FAMILY MEDICINE

## 2021-08-26 PROCEDURE — G8417 CALC BMI ABV UP PARAM F/U: HCPCS | Performed by: FAMILY MEDICINE

## 2021-08-26 PROCEDURE — 3051F HG A1C>EQUAL 7.0%<8.0%: CPT | Performed by: FAMILY MEDICINE

## 2021-08-26 PROCEDURE — G8417 CALC BMI ABV UP PARAM F/U: HCPCS | Performed by: INTERNAL MEDICINE

## 2021-08-26 PROCEDURE — G0438 PPPS, INITIAL VISIT: HCPCS | Performed by: FAMILY MEDICINE

## 2021-08-26 PROCEDURE — 3017F COLORECTAL CA SCREEN DOC REV: CPT | Performed by: INTERNAL MEDICINE

## 2021-08-26 RX ORDER — METOPROLOL SUCCINATE 25 MG/1
25 TABLET, EXTENDED RELEASE ORAL DAILY
COMMUNITY
Start: 2021-08-24

## 2021-08-26 SDOH — ECONOMIC STABILITY: TRANSPORTATION INSECURITY
IN THE PAST 12 MONTHS, HAS THE LACK OF TRANSPORTATION KEPT YOU FROM MEDICAL APPOINTMENTS OR FROM GETTING MEDICATIONS?: NO

## 2021-08-26 SDOH — ECONOMIC STABILITY: TRANSPORTATION INSECURITY
IN THE PAST 12 MONTHS, HAS LACK OF TRANSPORTATION KEPT YOU FROM MEETINGS, WORK, OR FROM GETTING THINGS NEEDED FOR DAILY LIVING?: NO

## 2021-08-26 SDOH — ECONOMIC STABILITY: FOOD INSECURITY: WITHIN THE PAST 12 MONTHS, YOU WORRIED THAT YOUR FOOD WOULD RUN OUT BEFORE YOU GOT MONEY TO BUY MORE.: NEVER TRUE

## 2021-08-26 SDOH — ECONOMIC STABILITY: FOOD INSECURITY: WITHIN THE PAST 12 MONTHS, THE FOOD YOU BOUGHT JUST DIDN'T LAST AND YOU DIDN'T HAVE MONEY TO GET MORE.: NEVER TRUE

## 2021-08-26 ASSESSMENT — PATIENT HEALTH QUESTIONNAIRE - PHQ9
SUM OF ALL RESPONSES TO PHQ9 QUESTIONS 1 & 2: 0
SUM OF ALL RESPONSES TO PHQ QUESTIONS 1-9: 0
2. FEELING DOWN, DEPRESSED OR HOPELESS: 0
1. LITTLE INTEREST OR PLEASURE IN DOING THINGS: 0
SUM OF ALL RESPONSES TO PHQ QUESTIONS 1-9: 0
SUM OF ALL RESPONSES TO PHQ QUESTIONS 1-9: 0

## 2021-08-26 ASSESSMENT — LIFESTYLE VARIABLES: HOW OFTEN DO YOU HAVE A DRINK CONTAINING ALCOHOL: 0

## 2021-08-26 ASSESSMENT — SOCIAL DETERMINANTS OF HEALTH (SDOH): HOW HARD IS IT FOR YOU TO PAY FOR THE VERY BASICS LIKE FOOD, HOUSING, MEDICAL CARE, AND HEATING?: NOT HARD AT ALL

## 2021-08-26 NOTE — PROGRESS NOTES
Subjective  Dax Soto., 77 y.o. male presents today with:  Chief Complaint   Patient presents with    6 Month Follow-Up           HPI    Frequent PACs - On metoprolol and PAC s seem to be better controlled. Sleep study was normal.     Patient is here for f/u HTN. Is compliant with meds and has no side effects from them. Avoids added salt. Tries to eat healthy. Exercises occasionally. Has no chest pain, shortness of breath, palpitations or edema. Patient is here for DM f/u. Sugars have been moderately well-controlled and patient has not been experiencing hyper- or hypoglycemic symptoms since stopping insulin. . Compliance with meds is good and there are no side effects. Patient exercises occasionally and tries to eat healthy. Is up to date on foot and eye exams and immunizations. Has stopped insulin because of hypoglycemia and is taking Januvia. This AM sugar was 135. Last night it was 91. Refuses COVID immunization. Refuses discussion re: COVID vaccine. No other questions and or concerns for today's visit        Past Medical History:   Diagnosis Date    Abnormal EKG 2/25/2021    Adenocarcinoma of prostate, stage 2 ([facility] [address] 2018    Jim 3+4, Dr Jayashree Zapien  PSA 7.69 intermediate risk    Erectile dysfunction associated with type 2 diabetes mellitus ([address]     Essential hypertension, benign     Intellectual disability     Irregular heart rhythm 2/25/2021    Low HDL (under 40)     Obesity (BMI 30-39. 9)     Obesity (BMI 30-39. 9)     Pes planus of both feet 2015    Dr Rice Single S/P colonoscopy with polypectomy 2014, 2019    Dr Louretta Bernheim 3a chronic kidney disease ([facility] [address] 2/25/2021    Uncontrolled type 2 diabetes mellitus with microalbuminuria [facility]) 2007    Dr Estrella Given    Unspecified deformity of ankle and foot, acquired     Vitamin B12 deficiency anemia 2016     Past Surgical History:   Procedure Laterality Date    COLONOSCOPY  03/20/2014    Polyp, Diverticulosis, Internal Hemorrhoids- [place]    COLONOSCOPY N/A 2019    COLORECTAL CANCER SCREENING, NOT HIGH RISK performed by Delbert Balderas MD at [facility].      Full upper/lower extractions    PROSTATE BIOPSY  2015    Dr Rodney Javed     Social History     Socioeconomic History    Marital status: Single     Spouse name: Not on file    Number of children: 2    Years of education: Not on file    Highest education level: Not on file   Occupational History    Occupation: [address], others, SSI   Tobacco Use    Smoking status: Former Smoker     Packs/day: 0.50     Years: 53.00     Pack years: 26.50     Types: Cigarettes     Start date:      Quit date:      Years since quittin.6    Smokeless tobacco: Never Used   Vaping Use    Vaping Use: Never used   Substance and Sexual Activity    Alcohol use: Yes     Comment: occasionally, beer- recovering alcoholic    Drug use: No    Sexual activity: Yes     Partners: Female   Other Topics Concern    Not on file   Social History Narrative    Born in [place] [employer], one of 11    Lives in a house in [place] with girlfriend    2 sons in [place], keeps in touch with one of them    Grands: many    Worked \"everywhere\", on disability         Hobbies: video games     Social Determinants of Health     Financial Resource Strain: Low Risk     Difficulty of Paying Living Expenses: Not hard at all   Food Insecurity: No Food Insecurity    Worried About [address] in the Last Year: Never true    [address]  in the Last Year: Never true   Transportation Needs: No Transportation Needs    Lack of Transportation (Medical): No    Lack of Transportation (Non-Medical):  No   Physical Activity:     Days of Exercise per Week:     Minutes of Exercise per Session:    Stress:     Feeling of Stress :    Social Connections:     Frequency of Communication with Friends and Family:     Frequency of Social Gatherings with Friends and Family:     Attends [employer] Services:  Active Member of Clubs or Organizations:     Attends Club or Organization Meetings:     Marital Status:    Intimate Partner Violence:     Fear of Current or Ex-Partner:     Emotionally Abused:     Physically Abused:     Sexually Abused:      Family History   Problem Relation Age of Onset    Stroke Mother         dec age [de-identified]    Cancer Brother         liver, dec age 79    Diabetes Father     Cancer Father         prostate     No Known Allergies  Current Outpatient Medications   Medication Sig Dispense Refill    metoprolol succinate (TOPROL XL) 25 MG extended release tablet       blood glucose test strips (ASCENSIA AUTODISC VI;ONE TOUCH ULTRA TEST VI) strip 1 each by In Vitro route daily As needed.  100 each 3    glimepiride (AMARYL) 4 MG tablet take 1 tablet by mouth twice a day with meals 180 tablet 1    SITagliptin (JANUVIA) 100 MG tablet take 1 tablet by mouth once daily 90 tablet 3    cloNIDine (CATAPRES) 0.1 MG tablet take 1 tablet by mouth twice a day 180 tablet 4    atorvastatin (LIPITOR) 20 MG tablet take 1 tablet by mouth once daily 90 tablet 4    amLODIPine-benazepril (LOTREL) 5-20 MG per capsule take 1 capsule by mouth once daily 90 capsule 4    vitamin D (RA VITAMIN D-3) 25 MCG (1000 UT) TABS tablet take 1 tablet by mouth once daily 90 tablet 4    Cyanocobalamin ER (RA VITAMIN B-12 TR) 1000 MCG TBCR take 1 tablet by mouth once daily 90 tablet 4    Lancets MISC 1 each by In Vitro route 3 times daily DX: E11.65, IDDM (please dispense covered brand) 100 each 6    Lancets (ONETOUCH DELICA PLUS [id number]) MISC use 1 LANCET to TEST BLOOD SUGAR three times a day 100 each 6    aspirin (RA ASPIRIN EC) 81 MG EC tablet take 1 tablet by mouth once daily 90 tablet 1    RA Alcohol Swabs 70 % PADS use as directed once daily 100 each 1    blood glucose test strips (ONE TOUCH ULTRA TEST) strip TEST three times a day 100 strip 3    acetaminophen (TYLENOL) 500 MG tablet Take 1 tablet by mouth 4 times daily as needed for Pain 120 tablet 2    hydrocortisone 2.5 % cream apply to affected area twice a day (DO NOT APPLY TO FACE)  0    sildenafil (VIAGRA) 100 MG tablet Take 1 tablet by mouth daily as needed for Erectile Dysfunction 5 tablet 11    Blood Glucose Monitoring Suppl (ONE TOUCH ULTRA MINI) w/Device KIT 1 kit by Does not apply route 3 times daily DX: E11.65, IDDM 1 kit 0    metFORMIN (GLUCOPHAGE) 1000 MG tablet take 1 tablet by mouth twice a day with meals (Patient not taking: Reported on 8/26/2021) 180 tablet 3     No current facility-administered medications for this visit. PMH, Surgical Hx, Family Hx, and Social Hxreviewed and updated. Health Maintenance reviewed. Objective    Vitals:    08/26/21 1438   BP: 130/70   Pulse: 76   Resp: 16   Temp: 97.1 °F (36.2 °C)   TempSrc: Temporal   SpO2: 97%   Weight: 180 lb (81.6 kg)   Height: 5' 9\" (1.753 m)        Physical Exam  Constitutional:       General: He is not in acute distress. Appearance: He is well-developed. HENT:      Head: Normocephalic and atraumatic. Eyes:      Conjunctiva/sclera: Conjunctivae normal.   Cardiovascular:      Rate and Rhythm: Normal rate and regular rhythm. Heart sounds: Normal heart sounds. Pulmonary:      Effort: No respiratory distress. Breath sounds: No wheezing or rales. Musculoskeletal:      Cervical back: Normal range of motion and neck supple. Lymphadenopathy:      Cervical: No cervical adenopathy. Skin:     General: Skin is warm and dry. Neurological:      Mental Status: He is alert and oriented to person, place, and time.            Lab Results   Component Value Date    LABA1C 7.9 (H) 08/17/2021    LABA1C 8.0 (H) 01/28/2021    LABA1C 7.6 08/25/2020     Lab Results   Component Value Date    LABMICR 1.50 02/05/2020    CREATININE 1.20 08/17/2021     Lab Results   Component Value Date    ALT 14 09/20/2018    AST 13 09/20/2018     Lab Results   Component Value Date    CHOL 127 05/04/2020 TRIG 90 05/04/2020    HDL 33 (L) 05/04/2020    LDLCALC 76 05/04/2020        Lab Results   Component Value Date    WBC 6.6 08/17/2021    HGB 12.1 (L) 08/17/2021    HCT 37.9 (L) 08/17/2021     08/17/2021    CHOL 127 05/04/2020    TRIG 90 05/04/2020    HDL 33 (L) 05/04/2020    ALT 14 09/20/2018    AST 13 09/20/2018     08/17/2021    K 4.7 08/17/2021     08/17/2021    CREATININE 1.20 08/17/2021    BUN 11 08/17/2021    CO2 23 08/17/2021    TSH 1.530 05/04/2020    PSA 0.25 01/28/2021    INR 1.0 04/09/2012    LABA1C 7.9 (H) 08/17/2021    LABMICR 1.50 02/05/2020       Assessment & Plan   Visit Diagnoses and Associated Orders     Essential hypertension, benign    -  Primary    Stable and well-controlled on current meds         Uncontrolled type 2 diabetes mellitus with microalbuminuria ([facility] [address]        Improving and well-controlled on current meds         Stage 3a chronic kidney disease ([facility] [address]        Improving, fairly well controlled         ORDERS WITHOUT AN ASSOCIATED DIAGNOSIS    metoprolol succinate (TOPROL XL) 25 MG extended release tablet [[id number]]              Reviewed with the patient: all disease processes, current clinical status, medications, activities and diet.      Side effects, adverse effects of the medication prescribed today, as well as treatment plan/ rationale and result expectations have been discussed with the patient who expresses understanding and desires to proceed.     Close follow up to evaluate treatment results and for coordination of care. I have reviewed the patient's medical history in detail and updated the computerized patient record. More than 50% of the appointment was spent in face-to-face counseling, education and care coordination. No orders of the defined types were placed in this encounter. No orders of the defined types were placed in this encounter.     Medications Discontinued During This Encounter   Medication Reason    insulin glargine (Nelta Nims KWIKPEN) 100 UNIT/ML injection pen Patient Choice    insulin glargine (LANTUS SOLOSTAR) 100 UNIT/ML injection pen Patient Choice    Insulin Pen Needle (B-D UF III MINI PEN NEEDLES) 31G X 5 MM MISC LIST CLEANUP    ONE TOUCH ULTRASOFT LANCETS MISC LIST CLEANUP     Return in about 6 months (around 2/26/2022) for DM, HTN, CKD - OV. Controlled Substance Monitoring:    Acute and Chronic Pain Monitoring:   No flowsheet data found.         Hanh Low MD

## 2021-08-26 NOTE — PATIENT INSTRUCTIONS
heart-healthy diet is one that limits sodium , certain types of fat , and cholesterol . This type of diet is recommended for:   People with any form of cardiovascular disease (eg, coronary heart disease , peripheral vascular disease , previous heart attack , previous stroke )   People with risk factors for cardiovascular disease, such as high blood pressure , high cholesterol , or diabetes   Anyone who wants to lower their risk of developing cardiovascular disease   Sodium    Sodium is a mineral found in many foods. In general, most people consume much more sodium than they need. Diets high in sodium can increase blood pressure and lead to edema (water retention). On a heart-healthy diet, you should consume no more than 2,300 mg (milligrams) of sodium per dayabout the amount in one teaspoon of table salt. The foods highest in sodium include table salt (about 50% sodium), processed foods, convenience foods, and preserved foods. Cholesterol    Cholesterol is a fat-like, waxy substance in your blood. Our bodies make some cholesterol. It is also found in animal products, with the highest amounts in fatty meat, egg yolks, whole milk, cheese, shellfish, and organ meats. On a heart-healthy diet, you should limit your cholesterol intake to less than 200 mg per day. It is normal and important to have some cholesterol in your bloodstream. But too much cholesterol can cause plaque to build up within your arteries, which can eventually lead to a heart attack or stroke. The two types of cholesterol that are most commonly referred to are:   Low-density lipoprotein (LDL) cholesterol  Also known as bad cholesterol, this is the cholesterol that tends to build up along your arteries. Bad cholesterol levels are increased by eating fats that are saturated or hydrogenated. Optimal level of this cholesterol is less than 100. Over 130 starts to get risky for heart disease.    High-density lipoprotein (HDL) cholesterol  Also known as example, this would mean 60 grams of fat or less per day. Saturated fat and trans fat in your diet raises your blood cholesterol the most, much more than dietary cholesterol does. For this reason, on a heart-healthy diet, less than 7% of your calories should come from saturated fat and ideally 0% from trans fat. On an 1800-calorie diet, this translates into less than 14 grams of saturated fat per day, leaving 46 grams of fat to come from mono- and polyunsaturated fats.    Food Choices on a Heart Healthy Diet   Food Category   Foods Recommended   Foods to Avoid   Grains   Breads and rolls without salted tops Most dry and cooked cereals Unsalted crackers and breadsticks Low-sodium or homemade breadcrumbs or stuffing All rice and pastas   Breads, rolls, and crackers with salted tops High-fat baked goods (eg, muffins, donuts, pastries) Quick breads, self-rising flour, and biscuit mixes Regular bread crumbs Instant hot cereals Commercially prepared rice, pasta, or stuffing mixes   Vegetables   Most fresh, frozen, and low-sodium canned vegetables Low-sodium and salt-free vegetable juices Canned vegetables if unsalted or rinsed   Regular canned vegetables and juices, including sauerkraut and pickled vegetables Frozen vegetables with sauces Commercially prepared potato and vegetable mixes   Fruits   Most fresh, frozen, and canned fruits All fruit juices   Fruits processed with salt or sodium   Milk   Nonfat or low-fat (1%) milk Nonfat or low-fat yogurt Cottage cheese, low-fat ricotta, cheeses labeled as low-fat and low-sodium   Whole milk Reduced-fat (2%) milk Malted and chocolate milk Full fat yogurt Most cheeses (unless low-fat and low salt) Buttermilk (no more than 1 cup per week)   Meats and Beans   Lean cuts of fresh or frozen beef, veal, lamb, or pork (look for the word loin) Fresh or frozen poultry without the skin Fresh or frozen fish and some shellfish Egg whites and egg substitutes (Limit whole eggs to three per week) Tofu Nuts or seeds (unsalted, dry-roasted), low-sodium peanut butter Dried peas, beans, and lentils   Any smoked, cured, salted, or canned meat, fish, or poultry (including melvin, chipped beef, cold cuts, hot dogs, sausages, sardines, and anchovies) Poultry skins Breaded and/or fried fish or meats Canned peas, beans, and lentils Salted nuts   Fats and Oils   Olive oil and canola oil Low-sodium, low-fat salad dressings and mayonnaise   Butter, margarine, coconut and palm oils, melvin fat   Snacks, Sweets, and Condiments   Low-sodium or unsalted versions of broths, soups, soy sauce, and condiments Pepper, herbs, and spices; vinegar, lemon, or lime juice Low-fat frozen desserts (yogurt, sherbet, fruit bars) Sugar, cocoa powder, honey, syrup, jam, and preserves Low-fat, trans-fat free cookies, cakes, and pies Geraldo and animal crackers, fig bars, sea snaps   High-fat desserts Broth, soups, gravies, and sauces, made from instant mixes or other high-sodium ingredients Salted snack foods Canned olives Meat tenderizers, seasoning salt, and most flavored vinegars   Beverages   Low-sodium carbonated beverages Tea and coffee in moderation Soy milk   Commercially softened water   Suggestions   Make whole grains, fruits, and vegetables the base of your diet. Choose heart-healthy fats such as canola, olive, and flaxseed oil, and foods high in heart-healthy fats, such as nuts, seeds, soybeans, tofu, and fish. Eat fish at least twice per week; the fish highest in omega-3 fatty acids and lowest in mercury include salmon, herring, mackerel, sardines, and canned chunk light tuna. If you eat fish less than twice per week or have high triglycerides, talk to your doctor about taking fish oil supplements. Read food labels.    For products low in fat and cholesterol, look for fat free, low-fat, cholesterol free, saturated fat free, and trans fat freeAlso scan the Nutrition Facts Label, which lists saturated fat, trans fat, and cholesterol amounts. For products low in sodium, look for sodium free, very low sodium, low sodium, no added salt, and unsalted   Skip the salt when cooking or at the table; if food needs more flavor, get creative and try out different herbs and spices. Garlic and onion also add substantial flavor to foods. Trim any visible fat off meat and poultry before cooking, and drain the fat off after earl. Use cooking methods that require little or no added fat, such as grilling, boiling, baking, poaching, broiling, roasting, steaming, stir-frying, and sauting. Avoid fast food and convenience food. They tend to be high in saturated and trans fat and have a lot of added salt. Talk to a registered dietitian for individualized diet advice. Last Reviewed: March 2011 Zahra Mayo MS, MPH, RD   Updated: 3/29/2011   ·     High-Fiber Diet     What Is Fiber? Dietary fiber is a form of carbohydrate found in plants that cannot be digested by humans. All plants contain fiber, including fruits, vegetables, grains, and legumes. Fiber is often classified into two categories: soluble and insoluble. Soluble fiber draws water into the bowel and can help slow digestion. Examples of foods that are high in soluble fiber include oatmeal, oat bran, barley, legumes (eg, beans and peas), apples, and strawberries. Insoluble fiber speeds digestion and can add bulk to the stool. Examples of foods that are high in insoluble fiber include whole-wheat products, wheat bran, cauliflower, green beans, and potatoes. Why Follow a High-Fiber Diet? A high-fiber diet is often recommended to prevent and treat constipation , hemorrhoids , diverticulitis , and irritable bowel syndrome . Eating a high-fiber diet can also help improve your cholesterol levels, lower your risk of coronary heart disease , reduce your risk of type 2 diabetes , and lower your weight.  For people with type 1 or 2 diabetes, a high-fiber diet can also help stabilize blood sugar levels. How Much Fiber Should I Eat? A high-fiber diet should contain  20-35 grams  of fiber a day. This is actually the amount recommended for the general adult population; however, most Americans eat only 15 grams of fiber per day. Digestion of Fiber   Eating a higher fiber diet than usual can take some getting used to by your body's digestive system. To avoid the side effects of sudden increases in dietary fiber (eg, gas, cramping, bloating, and diarrhea), increase fiber gradually and be sure to drink plenty of fluids every day. Tips for Increasing Fiber Intake   Whenever possible, choose whole grains over refined grains (eg, brown rice instead of white rice, whole-wheat bread instead of white bread). Include a variety of grains in your diet, such as wheat, rye, barley, oats, quinoa, and bulgur. Eat more vegetarian-based meals. Here are some ideas: black bean burgers, eggplant lasagna, and veggie tofu stir-freeman. Choose high-fiber snacks, such as fruits, popcorn, whole-grain crackers, and nuts. Make whole-grain cereal or whole-grain toast part of your daily breakfast regime. When eating out, whether ordering a sandwich or dinner, ask for extra vegetables. When baking, replace part of the white flour with whole-wheat flour. Whole-wheat flour is particularly easy to incorporate into a recipe. High-Fiber Diet Eating Guide   Food Category   Foods Recommended   Notes   Grains   Whole-grain breads, muffins, bagels, or arelis bread Rye bread Whole-wheat crackers or crisp breads Whole-grain or bran cereals Oatmeal, oat bran, or grits Wheat germ Whole-wheat pasta and brown rice   Read the ingredients list on food labels. Look for products that list \"whole\" as the first ingredient (eg, whole-wheat, whole oats). Choose cereals with at least 2 grams of fiber per serving.    Vegetables   All vegetables, especially asparagus, bean sprouts, broccoli, [place] sprouts, cabbage, carrots, cauliflower, celery, corn, greens, green beans, green pepper, onions, peas, potatoes (with skin), snow peas, spinach, squash, sweet potatoes, tomatoes, zucchini   For maximum fiber intake, eat the peels of fruits and vegetablesjust be sure to wash them well first.   Fruits   All fruits, especially apples, berries, grapefruits, mangoes, nectarines, oranges, peaches, pears, dried fruits (figs, dates, prunes, raisins)   Choose raw fruits and vegetables over juice, cooked, or cannedraw fruit has more fiber. Dried fruit is also a good source of fiber. Milk   With the exception of yogurt containing inulin (a type of fiber), dairy foods provide little fiber. Add more fiber by topping your yogurt or cottage cheese with fresh fruit, whole grain or bran cereals, nuts, or seeds. Meats and Beans   All beans and peas, especially Garbanzo beans, kidney beans, lentils, lima beans, split peas, and corona beans All nuts and seeds, especially almonds, peanuts, [place] nuts, cashews, peanut butter, walnuts, sesame and sunflower seeds All meat, poultry, fish, and eggs   Increase fiber in meat dishes by adding corona beans, kidney beans, black-eyed peas, bran, or oatmeal. If you are following a low-fat diet, use nuts and seeds only in moderation. Fats and Oils   All in moderation   Fats and oils do not provide fiber   Snacks, Sweets, and Condiments   Fruit Nuts Popcorn, whole-wheat pretzels, or trail mix made with dried fruits, nuts, and seeds Cakes, breads, and cookies made with oatmeal or whole-wheat flour   Most snack foods do not provide much fiber. Choose snacks with at least 2 grams of fiber per serving. Last Reviewed: March 2011 Jacoby Gonzalez MS, MPH, RD   Updated: 3/29/2011   ·     Keeping Home a [facility]       As we get older, changes in balance, gait, strength, vision, hearing, and cognition make even the most youthful senior more prone to accidents.  Falls are one of the leading health risks for older people. This increased risk of falling is related to:   Aging process (eg, decreased muscle strength, slowed reflexes)   Higher incidence of chronic health problems (eg, arthritis, diabetes) that may limit mobility, agility or sensory awareness   Side effects of medicine (eg, dizziness, blurred vision)especially medicines like prescription pain medicines and drugs used to treat mental health conditions   Depending on the brittleness of your bones, the consequences of a fall can be serious and long lasting. Home Life   Research by the Association of Aging [facility]) shows that some home accidents among older adults can be prevented by making simple lifestyle changes and basic modifications and repairs to the home environment. Here are some lifestyle changes that experts recommend:   Have your hearing and vision checked regularly. Be sure to wear prescription glasses that are right for you. Speak to your doctor or pharmacist about the possible side effects of your medicines. A number of medicines can cause dizziness. If you have problems with sleep, talk to your doctor. Limit your intake of alcohol. If necessary, use a cane or walker to help maintain your balance. Wear supportive, rubber-soled shoes, even at home. If you live in a region that gets wintry weather, you may want to put special cleats on your shoes to prevent you from slipping on the snow and ice. Exercise regularly to help maintain muscle tone, agility, and balance. Always hold the banister when going up or down stairs. Also, use  bars when getting in or out of the bath or shower, or using the toilet. To avoid dizziness, get up slowly from a lying down position. Sit up first, dangling your legs for a minute or two before rising to a standing position. Overall Home Safety Check   According to the Consumer Product Safety Commision's \"Older Consumer Home Safety Checklist,\" it is important to check for potential hazards in each room.  And remember, proper lighting is an essential factor in home safety. If you cannot see clearly, you are more likely to fall. Important questions to ask yourself include:   Are lamp, electric, extension, and telephone cords placed out of the flow of traffic and maintained in good condition? Have frayed cords been replaced? Are all small rugs and runners slip resistant? If not, you can secure them to the floor with a special double-sided carpet tape. Are smoke detectors properly locatedone on every floor of your home and one outside of every sleeping area? Are they in good working order? Are batteries replaced at least once a year? Do you have a well-maintained carbon monoxide detector outside every sleeping are in your home? Does your furniture layout leave plenty of space to maneuver between and around chairs, tables, beds, and sofas? Are hallways, stairs and passages between rooms well lit? Can you reach a lamp without getting out of bed? Are floor surfaces well maintained? Shag rugs, high-pile carpeting, tile floors, and polished wood floors can be particularly slippery. Stairs should always have handrails and be carpeted or fitted with a non-skid tread. Is your telephone easily reachable. Is the cord safely tucked away? Room by Room   According to the Association of Aging, bathrooms and sanjeev are the two most potentially hazardous rooms in your home. In the Kitchen    Be sure your stove is in proper working order and always make sure burners and the oven are off before you go out or go to sleep. Keep pots on the back burners, turn handles away from the front of the stove, and keep stove clean and free of grease build-up. Kitchen ventilation systems and range exhausts should be working properly. Keep flammable objects such as towels and pot holders away from the cooking area except when in use. Make sure kitchen curtains are tied back.     Move cords and appliances away from the sink and hot surfaces. If extension cords are needed, install wiring guides so they do not hang over the sink, range, or working areas. Look for coffee pots, kettles and toaster ovens with automatic shut-offs. Keep a mop handy in the kitchen so you can wipe up spills instantly. You should also have a small fire extinguisher. Arrange your kitchen with frequently used items on lower shelves to avoid the need to stand on a stepstool to reach them. Make sure countertops are well-lit to avoid injuries while cutting and preparing food. In the Bathroom    Use a non-slip mat or decals in the tub and shower, since wet, soapy tile or porcelain surfaces are extremely slippery. Make sure bathroom rugs are non-skid or tape them firmly to the floor. Bathtubs should have at least one, preferably two, grab bars, firmly attached to structural supports in the wall. (Do not use built-in soap holders or glass shower doors as grab bars.)    Tub seats fitted with non-slip material on the legs allow you to wash sitting down. For people with limited mobility, bathtub transfer benches allow you to slide safely into the tub. Raised toilet seats and toilet safety rails are helpful for those with knee or hip problems. In the [facility]    Make sure you use a nightlight and that the area around your bed is clear of potential obstacles. Be careful with electric blankets and never go to sleep with a heating pad, which can cause serious burns even if on a low setting. Use fire-resistant mattress covers and pillows, and NEVER smoke in bed. Keep a phone next to the bed that is programmed to dial 911 at the push of a button. If you have a chronic condition, you may want to sign on with an automatic call-in service. Typically the system includes a small pendant that connects directly to an emergency medical voice-response system.  You should also make arrangements to stay in contact with someonefriend, neighbor, family memberon a regular schedule. Fire Prevention   According to the [employer]. (Smoke Alarms for Every) [address] [facility], senior citizens are one of the two highest risk groups for death and serious injuries due to residential fires. When cooking, wear short-sleeved items, never a bulky long-sleeved robe. The [facility]'s Safety Checklist for Older Consumers emphasizes the importance of checking basements, garages, workshops and storage areas for fire hazards, such as volatile liquids, piles of old rags or clothing and overloaded circuits. Never smoke in bed or when lying down on a couch or recliner chair. Small portable electric or kerosene heaters are responsible for many home fires and should be used cautiously if at all. If you do use one, be sure to keep them away from flammable materials. In case of fire, make sure you have a pre-established emergency exit plan. Have a professional check your fireplace and other fuel-burning appliances yearly. [employer]   Baby boomers entering the sandra years will continue to see the development of new products to help older adults live safely and independently in spite of age-related changes. Making Life More Livable  , by  [employer], lists over 1,000 products for \"living well in the mature years,\" such as bathing and mobility aids, household security devices, ergonomically designed knives and peelers, and faucet valves and knobs for temperature control. Medical supply stores and organizations are good sources of information about products that improve your quality of life and insure your safety. Last Reviewed: November 2009 Cindy Degroot MD   Updated: 3/7/2011     ·        Advance Care Planning: Care Instructions  Your Care Instructions     It can be hard to live with an illness that cannot be cured. But if your health is getting worse, you may want to make decisions about end-of-life care.  Planning for the end of your life does not mean that you are giving up. It is a way to make sure that your wishes are met. Clearly stating your wishes can make it easier for your loved ones. Making plans while you are still able may also ease your mind and make your final days less stressful and more meaningful. Follow-up care is a key part of your treatment and safety. Be sure to make and go to all appointments, and call your doctor if you are having problems. It's also a good idea to know your test results and keep a list of the medicines you take. What can you do to plan for the end of life? You can bring these issues up with your doctor. You do not need to wait until your doctor starts the conversation. You might start with, \"What makes life worth living for me is. Shaaron Money Shaaron Money \" And then follow it with, \"I would not be willing to live with . Shaaron Money Shaaron Money Shaaron Money \" When you complete this sentence it helps your doctor understand your wishes. Talk openly and honestly with your doctor. This is the best way to understand the decisions you will need to make as your health changes. Know that you can always change your mind. Ask your doctor about commonly used life-support measures. These include tube feedings, breathing machines, and fluids given through a vein (IV). Understanding these treatments will help you decide whether you want them. You may choose to have these life-supporting treatments for a limited time. This allows a trial period to see whether they will help you. You may also decide that you want your doctor to take only certain measures to keep you alive. It may help to think about the big picture, like what makes life worth living for you or what your values and goals are. Talk to your doctor about how long you are likely to live. Your doctor may be able to give you an idea of what usually happens with your specific illness. Think about preparing papers that state your wishes. These papers are called advance directives.  If you do this early and review them often, there will not be any confusion about what you want. You can change your instructions at any time. Which papers should you prepare? Advance directives are legal papers that tell doctors how you want to be cared for at the end of your life. You do not need a  to write these papers. Ask your doctor or your state health department for information on how to write your advance directives. They may have the forms for each of these types of papers. Make sure your doctor has a copy of these on file, and give a copy to a family member or close friend. Consider a do-not-resuscitate order (DNR). This order asks that no extra treatments be done if your heart stops or you stop breathing. Extra treatments may include cardiopulmonary resuscitation (CPR), electrical shock to restart your heart, or a machine to breathe for you. If you decide to have a DNR order, ask your doctor to explain and write it. Place the order in your home where everyone can easily see it. Consider a living will. A living will explains your wishes about life support and other treatments at the end of your life if you become unable to speak for yourself. Living gordon tell doctors to use or not use treatments that would keep you alive. You must have one or two witnesses or a notary present when you sign this form. A living will may be called something else in your state. Consider a medical power of . This form allows you to name a person to make decisions about your care if you are not able to. Most people ask a close friend or family member. Talk to this person about the kinds of treatments you want and those that you do not want. Make sure this person understands your wishes. A medical power of  may be called something else in your state. These legal papers are simple to change. Tell your doctor what you want to change, and ask him or her to make a note in your medical file. Give your family updated copies of the papers.   Where can you learn will.  Some states may limit your right to refuse treatment in certain cases. For example, you may need to clearly state in your living will that you don't want artificial hydration and nutrition, such as being fed through a tube. Is a living will a legal document? A living will is a legal document. Each state has its own laws about living gordon. And a living will may be called something else in your state. Here are some things to know about living gordon. You don't need an  to complete a living will. But legal advice can be helpful if your state's laws are unclear. It can also help if your health history is complicated or your family can't agree on what should be in your living will. You can change your living will at any time. Some people find that their wishes about end-of-life care change as their health changes. If you make big changes to your living will, complete a new form. If you move to another state, make sure that your living will is legal in the state where you now live. In most cases, doctors will respect your wishes even if you have a form from a different state. You might use a universal form that has been approved by many states. This kind of form can sometimes be filled out and stored online. Your digital copy will then be available wherever you have a connection to the internet. The doctors and nurses who need to treat you can find it right away. Your state may offer an online registry. This is another place where you can store your living will online. It's a good idea to get your living will notarized. This means using a person called a  to watch two people sign, or witness, your living will. What should you know when you create a living will? Here are some questions to ask yourself as you make your living will:  Do you know enough about life support methods that might be used?  If not, talk to your doctor so you know what might be done if you can't breathe on your own, your heart stops, or you can't swallow. What things would you still want to be able to do after you receive life-support methods? Would you want to be able to walk? To speak? To eat on your own? To live without the help of machines? Do you want certain Adventism practices performed if you become very ill? If you have a choice, where do you want to be cared for? In your home? At a hospital or nursing home? If you have a choice at the end of your life, where would you prefer to die? At home? In a hospital or nursing home? Somewhere else? Would you prefer to be buried or cremated? Do you want your organs to be donated after you die? What should you do with your living will? Make sure that your family members and your health care agent have copies of your living will (also called a declaration). Give your doctor a copy of your living will. Ask him or her to keep it as part of your medical record. If you have more than one doctor, make sure that each one has a copy. Put a copy of your living will where it can be easily found. For example, some people may put a copy on their refrigerator door. If you are using a digital copy, be sure your doctor, family members, and health care agent know how to find and access it. Where can you learn more? Go to [email or web address] and sign in to your [employer] account. Enter C316 in the [facility] box to learn more about \"Learning About Living Lossie Cynthia. \"     If you do not have an account, please click on the \"Sign Up Now\" link. Current as of: March 17, 2021               Content Version: 12.9  © 7092-5483 Healthwise, [employer]. Care instructions adapted under license by [facility] ([facility]). If you have questions about a medical condition or this instruction, always ask your healthcare professional. [place] [address] any warranty or liability for your use of this information.     ·        Learning About Medical Power of medical care. Or decisions may be made by a medical professional who doesn't know you well. In some cases, a  makes the decisions. When you name a health care agent, it is very clear who has the power to make health decisions for you. How do you name a health care agent? You name your health care agent on a legal form. This form is usually called a medical power of . Ask your hospital, state bar association, or office on aging where to find these forms. You must sign the form to make it legal. Some states require you to get the form notarized. This means that a person called a  watches you sign the form and then he or she signs the form. Some states also require that two or more witnesses sign the form. Be sure to tell your family members and doctors who your health care agent is. Where can you learn more? Go to [email or web address] and sign in to your YingYang account. Enter [id number] in the [employer] box to learn more about \"Learning About [address]. \"     If you do not have an account, please click on the \"Sign Up Now\" link. Current as of: March 17, 2021               Content Version: 12.9  © 4910-3738 Healthwise, Incorporated. Care instructions adapted under license by [facility] ([facility]). If you have questions about a medical condition or this instruction, always ask your healthcare professional. [place] [address] any warranty or liability for your use of this information. ·   Personalized Preventive Plan for Shaji Arenas 8/26/2021  Medicare offers a range of preventive health benefits. Some of the tests and screenings are paid in full while other may be subject to a deductible, co-insurance, and/or copay. Some of these benefits include a comprehensive review of your medical history including lifestyle, illnesses that may run in your family, and various assessments and screenings as appropriate.     After reviewing your medical record and screening and assessments performed today your provider may have ordered immunizations, labs, imaging, and/or referrals for you. A list of these orders (if applicable) as well as your Preventive Care list are included within your After Visit Summary for your review. Other Preventive Recommendations:    A preventive eye exam performed by an eye specialist is recommended every 1-2 years to screen for glaucoma; cataracts, macular degeneration, and other eye disorders. A preventive dental visit is recommended every 6 months. Try to get at least 150 minutes of exercise per week or 10,000 steps per day on a pedometer . Order or download the FREE \"Exercise & Physical Activity: Your Everyday Guide\" from [employer]. Call 0-228.502.4754 or search [employer] online. You need 3454-9530 mg of calcium and 9954-4561 IU of vitamin D per day. It is possible to meet your calcium requirement with diet alone, but a vitamin D supplement is usually necessary to meet this goal.  When exposed to the sun, use a sunscreen that protects against both UVA and UVB radiation with an SPF of 30 or greater. Reapply every 2 to 3 hours or after sweating, drying off with a towel, or swimming. Always wear a seat belt when traveling in a car. Always wear a helmet when riding a bicycle or motorcycle. Keep Your Memory Ajmey Keel       Many factors can affect your ability to remembera hectic lifestyle, aging, stress, chronic disease, and certain medicines. But, there are steps you can take to sharpen your mind and help preserve your memory. Challenge Your Brain   Regularly challenging your mind may help keeps it in top shape. Good mental exercises include:   Crossword puzzlesUse a dictionary if you need it; you will learn more that way. Brainteasers Try some!    Crafts, such as wood working and [facility], such as gardening and building model airplanes SocializingVisit old friends or join groups to meet new ones. Reading   Learning a new language   Taking a class, whether it be art history or sharona chi   TravelingExperience the food, history, and culture of your destination   Learning to use a computer   Going to museums, the theater, or thought-provoking movies   Changing things in your daily life, such as reversing your pattern in the grocery store or brushing your teeth using your nondominant hand   Use Memory Aids   There is no need to remember every detail on your own. These memory aids can help:   Calendars and day planners   Electronic organizers to store all sorts of helpful informationThese devices can \"beep\" to remind you of appointments. A book of days to record birthdays, anniversaries, and other occasions that occur on the same date every year   Detailed \"to-do\" lists and strategically placed sticky notes   Quick \"study\" sessionsBefore a gathering, review who will be there so their names will be fresh in your mind. Establish routinesFor example, keep your keys, wallet, and umbrella in the same place all the time or take medicine with your 8:00 AM glass of juice   Live a Healthy Life   Many actions that will keep your body strong will do the same for your mind. For example:   Talk to Your Doctor About Herbs and Supplements    Malnutrition and vitamin deficiencies can impair your mental function. For example, vitamin B12 deficiency can cause a range of symptoms, including confusion. But, what if your nutritional needs are being met? Can herbs and supplements still offer a benefit? Researchers have investigated a range of natural remedies, such as ginkgo , ginseng , and the supplement phosphatidylserine (PS). So far, though, the evidence is inconsistent as to whether these products can improve memory or thinking.    If you are interested in taking herbs and supplements, talk to your doctor first because they may interact with other medicines that you are taking. Exercise Regularly    Among the many benefits of regular exercise are increased blood flow to the brain and decreased risk of certain diseases that can interfere with memory function. One study found that even moderate exercise has a beneficial effect. Examples of \"moderate\" exercise include:   Playing 18 holes of golf once a week, without a cart   Playing tennis twice a week   Walking one mile per day   Manage Stress    It can be tough to remember what is important when your mind is cluttered. Make time for relaxation. Choose activities that calm you down, and make it routine. Manage Chronic Conditions    Side effects of high blood pressure , diabetes, and heart disease can interfere with mental function. Many of the lifestyle steps discussed here can help manage these conditions. Strive to eat a healthy diet, exercise regularly, get stress under control, and follow your doctor's advice for your condition. Minimize Medications    Talk to your doctor about the medicines that you take. Some may be unnecessary. Also, healthy lifestyle habits may lower the need for certain drugs. Last Reviewed: April 2010 Guille Mckeon MD   Updated: 4/13/2010       Keeping Home a [facility]       As we get older, changes in balance, gait, strength, vision, hearing, and cognition make even the most youthful senior more prone to accidents. Falls are one of the leading health risks for older people. This increased risk of falling is related to:   Aging process (eg, decreased muscle strength, slowed reflexes)   Higher incidence of chronic health problems (eg, arthritis, diabetes) that may limit mobility, agility or sensory awareness   Side effects of medicine (eg, dizziness, blurred vision)especially medicines like prescription pain medicines and drugs used to treat mental health conditions   Depending on the brittleness of your bones, the consequences of a fall can be serious and long lasting.    Home Life Research by the Association of Aging [facility]) shows that some home accidents among older adults can be prevented by making simple lifestyle changes and basic modifications and repairs to the home environment. Here are some lifestyle changes that experts recommend:   Have your hearing and vision checked regularly. Be sure to wear prescription glasses that are right for you. Speak to your doctor or pharmacist about the possible side effects of your medicines. A number of medicines can cause dizziness. If you have problems with sleep, talk to your doctor. Limit your intake of alcohol. If necessary, use a cane or walker to help maintain your balance. Wear supportive, rubber-soled shoes, even at home. If you live in a region that gets wintry weather, you may want to put special cleats on your shoes to prevent you from slipping on the snow and ice. Exercise regularly to help maintain muscle tone, agility, and balance. Always hold the banister when going up or down stairs. Also, use  bars when getting in or out of the bath or shower, or using the toilet. To avoid dizziness, get up slowly from a lying down position. Sit up first, dangling your legs for a minute or two before rising to a standing position. Overall Home Safety Check   According to the Consumer Product Safety Commision's \"Older Consumer Home Safety Checklist,\" it is important to check for potential hazards in each room. And remember, proper lighting is an essential factor in home safety. If you cannot see clearly, you are more likely to fall. Important questions to ask yourself include:   Are lamp, electric, extension, and telephone cords placed out of the flow of traffic and maintained in good condition? Have frayed cords been replaced? Are all small rugs and runners slip resistant? If not, you can secure them to the floor with a special double-sided carpet tape.    Are smoke detectors properly locatedone on every floor of your home and one outside of every sleeping area? Are they in good working order? Are batteries replaced at least once a year? Do you have a well-maintained carbon monoxide detector outside every sleeping are in your home? Does your furniture layout leave plenty of space to maneuver between and around chairs, tables, beds, and sofas? Are hallways, stairs and passages between rooms well lit? Can you reach a lamp without getting out of bed? Are floor surfaces well maintained? Shag rugs, high-pile carpeting, tile floors, and polished wood floors can be particularly slippery. Stairs should always have handrails and be carpeted or fitted with a non-skid tread. Is your telephone easily reachable. Is the cord safely tucked away? Room by Room   According to the Association of Aging, bathrooms and sanjeev are the two most potentially hazardous rooms in your home. In the Kitchen    Be sure your stove is in proper working order and always make sure burners and the oven are off before you go out or go to sleep. Keep pots on the back burners, turn handles away from the front of the stove, and keep stove clean and free of grease build-up. Kitchen ventilation systems and range exhausts should be working properly. Keep flammable objects such as towels and pot holders away from the cooking area except when in use. Make sure kitchen curtains are tied back. Move cords and appliances away from the sink and hot surfaces. If extension cords are needed, install wiring guides so they do not hang over the sink, range, or working areas. Look for coffee pots, kettles and toaster ovens with automatic shut-offs. Keep a mop handy in the kitchen so you can wipe up spills instantly. You should also have a small fire extinguisher. Arrange your kitchen with frequently used items on lower shelves to avoid the need to stand on a stepstool to reach them.     Make sure countertops are well-lit to avoid injuries while cutting and preparing food. In the Bathroom    Use a non-slip mat or decals in the tub and shower, since wet, soapy tile or porcelain surfaces are extremely slippery. Make sure bathroom rugs are non-skid or tape them firmly to the floor. Bathtubs should have at least one, preferably two, grab bars, firmly attached to structural supports in the wall. (Do not use built-in soap holders or glass shower doors as grab bars.)    Tub seats fitted with non-slip material on the legs allow you to wash sitting down. For people with limited mobility, bathtub transfer benches allow you to slide safely into the tub. Raised toilet seats and toilet safety rails are helpful for those with knee or hip problems. In the [facility]    Make sure you use a nightlight and that the area around your bed is clear of potential obstacles. Be careful with electric blankets and never go to sleep with a heating pad, which can cause serious burns even if on a low setting. Use fire-resistant mattress covers and pillows, and NEVER smoke in bed. Keep a phone next to the bed that is programmed to dial 911 at the push of a button. If you have a chronic condition, you may want to sign on with an automatic call-in service. Typically the system includes a small pendant that connects directly to an emergency medical voice-response system. You should also make arrangements to stay in contact with someonefriend, neighbor, family memberon a regular schedule. Fire Prevention   According to the [employer]. (Smoke Alarms for Every) [address] [place] [address], senior citizens are one of the two highest risk groups for death and serious injuries due to residential fires. When cooking, wear short-sleeved items, never a bulky long-sleeved robe.     The [facility]'s Safety Checklist for Older Consumers emphasizes the importance of checking basements, garages, workshops and storage areas for fire hazards, such as volatile liquids, piles of old rags or clothing and overloaded circuits. Never smoke in bed or when lying down on a couch or recliner chair. Small portable electric or kerosene heaters are responsible for many home fires and should be used cautiously if at all. If you do use one, be sure to keep them away from flammable materials. In case of fire, make sure you have a pre-established emergency exit plan. Have a professional check your fireplace and other fuel-burning appliances yearly. Helping Hands   Baby boomers entering the sandra years will continue to see the development of new products to help older adults live safely and independently in spite of age-related changes. Making Life More Livable  , by Angie Daugherty, lists over 1,000 products for \"living well in the mature years,\" such as bathing and mobility aids, household security devices, ergonomically designed knives and peelers, and faucet valves and knobs for temperature control. Medical supply stores and organizations are good sources of information about products that improve your quality of life and insure your safety. Last Reviewed: November 2009 Robson Terrell MD   Updated: 3/7/2011            Advance Care Planning: Care Instructions  Your Care Instructions     It can be hard to live with an illness that cannot be cured. But if your health is getting worse, you may want to make decisions about end-of-life care. Planning for the end of your life does not mean that you are giving up. It is a way to make sure that your wishes are met. Clearly stating your wishes can make it easier for your loved ones. Making plans while you are still able may also ease your mind and make your final days less stressful and more meaningful. Follow-up care is a key part of your treatment and safety. Be sure to make and go to all appointments, and call your doctor if you are having problems. It's also a good idea to know your test results and keep a list of the medicines you take.   What can you do to plan for the end of life? You can bring these issues up with your doctor. You do not need to wait until your doctor starts the conversation. You might start with, \"What makes life worth living for me is. Papito Tatyhilario Pebblesandres Linda \" And then follow it with, \"I would not be willing to live with . Clararenaandres Linda Mckeon \" When you complete this sentence it helps your doctor understand your wishes. Talk openly and honestly with your doctor. This is the best way to understand the decisions you will need to make as your health changes. Know that you can always change your mind. Ask your doctor about commonly used life-support measures. These include tube feedings, breathing machines, and fluids given through a vein (IV). Understanding these treatments will help you decide whether you want them. You may choose to have these life-supporting treatments for a limited time. This allows a trial period to see whether they will help you. You may also decide that you want your doctor to take only certain measures to keep you alive. It may help to think about the big picture, like what makes life worth living for you or what your values and goals are. Talk to your doctor about how long you are likely to live. Your doctor may be able to give you an idea of what usually happens with your specific illness. Think about preparing papers that state your wishes. These papers are called advance directives. If you do this early and review them often, there will not be any confusion about what you want. You can change your instructions at any time. Which papers should you prepare? Advance directives are legal papers that tell doctors how you want to be cared for at the end of your life. You do not need a  to write these papers. Ask your doctor or your state health department for information on how to write your advance directives. They may have the forms for each of these types of papers.  Make sure your doctor has a copy of these on file, and give a copy to a family member or close friend. Consider a do-not-resuscitate order (DNR). This order asks that no extra treatments be done if your heart stops or you stop breathing. Extra treatments may include cardiopulmonary resuscitation (CPR), electrical shock to restart your heart, or a machine to breathe for you. If you decide to have a DNR order, ask your doctor to explain and write it. Place the order in your home where everyone can easily see it. Consider a living will. A living will explains your wishes about life support and other treatments at the end of your life if you become unable to speak for yourself. Living gordon tell doctors to use or not use treatments that would keep you alive. You must have one or two witnesses or a notary present when you sign this form. A living will may be called something else in your state. Consider a medical power of . This form allows you to name a person to make decisions about your care if you are not able to. Most people ask a close friend or family member. Talk to this person about the kinds of treatments you want and those that you do not want. Make sure this person understands your wishes. A medical power of  may be called something else in your state. These legal papers are simple to change. Tell your doctor what you want to change, and ask him or her to make a note in your medical file. Give your family updated copies of the papers. Where can you learn more? Go to [email or web address] org and sign in to your [employer] account. Enter P184 in the [facility] box to learn more about \"Advance Care Planning: Care Instructions. \"     If you do not have an account, please click on the \"Sign Up Now\" link. Current as of: March 17, 2021               Content Version: 12.9  © 3891-9382 Healthwise, [employer]. Care instructions adapted under license by [facility] ([facility]).  If you have questions about a medical condition or this instruction, always ask your healthcare professional. [address] any warranty or liability for your use of this information. Learning About Living Cecile  What is a living will? A living will, also called a declaration, is a legal form. It tells your family and your doctor your wishes when you can't speak for yourself. It's used by the health professionals who will treat you as you near the end of your life or if you get seriously hurt or ill. If you put your wishes in writing, your loved ones and others will know what kind of care you want. They won't need to guess. This can ease your mind and be helpful to others. And you can change or cancel your living will at any time. A living will is not the same as an estate or property will. An estate will explains what you want to happen with your money and property after you die. How do you use it? A living will is used to describe the kinds of treatment or life support you want as you near the end of your life or if you get seriously hurt or ill. Keep these facts in mind about living gordon. Your living will is used only if you can't speak or make decisions for yourself. Most often, one or more doctors must certify that you can't speak or decide for yourself before your living will takes effect. If you get better and can speak for yourself again, you can accept or refuse any treatment. It doesn't matter what you said in your living will. Some states may limit your right to refuse treatment in certain cases. For example, you may need to clearly state in your living will that you don't want artificial hydration and nutrition, such as being fed through a tube. Is a living will a legal document? A living will is a legal document. Each state has its own laws about living gordon. And a living will may be called something else in your state. Here are some things to know about living gordon. You don't need an  to complete a living will.  But legal advice can be helpful if your state's laws are unclear. It can also help if your health history is complicated or your family can't agree on what should be in your living will. You can change your living will at any time. Some people find that their wishes about end-of-life care change as their health changes. If you make big changes to your living will, complete a new form. If you move to another state, make sure that your living will is legal in the state where you now live. In most cases, doctors will respect your wishes even if you have a form from a different state. You might use a universal form that has been approved by many states. This kind of form can sometimes be filled out and stored online. Your digital copy will then be available wherever you have a connection to the internet. The doctors and nurses who need to treat you can find it right away. Your state may offer an online registry. This is another place where you can store your living will online. It's a good idea to get your living will notarized. This means using a person called a [employer] to watch two people sign, or witness, your living will. What should you know when you create a living will? Here are some questions to ask yourself as you make your living will:  Do you know enough about life support methods that might be used? If not, talk to your doctor so you know what might be done if you can't breathe on your own, your heart stops, or you can't swallow. What things would you still want to be able to do after you receive life-support methods? Would you want to be able to walk? To speak? To eat on your own? To live without the help of machines? Do you want certain Judaism practices performed if you become very ill? If you have a choice, where do you want to be cared for? In your home? At a hospital or nursing home? If you have a choice at the end of your life, where would you prefer to die? At home? In a hospital or nursing home? Somewhere else? Would you prefer to be buried or cremated? Do you want your organs to be donated after you die? What should you do with your living will? Make sure that your family members and your health care agent have copies of your living will (also called a declaration). Give your doctor a copy of your living will. Ask him or her to keep it as part of your medical record. If you have more than one doctor, make sure that each one has a copy. Put a copy of your living will where it can be easily found. For example, some people may put a copy on their refrigerator door. If you are using a digital copy, be sure your doctor, family members, and health care agent know how to find and access it. Where can you learn more? Go to https://Personal Cell [email or web address].[email or web address]. org and sign in to your [employer] account. Enter U352 in the [employer] box to learn more about \"Learning About Living Perroy. \"     If you do not have an account, please click on the \"Sign Up Now\" link. Current as of: March 17, 2021               Content Version: 12.9  © 8041-7916 Healthwise, [employer]. Care instructions adapted under license by [facility] ([facility]). If you have questions about a medical condition or this instruction, always ask your healthcare professional. [address] any warranty or liability for your use of this information. Learning About Medical Power of   What is a medical power of ? A medical power of , also called a durable power of  for health care, is one type of the legal forms called advance directives. It lets you name the person you want to make treatment decisions for you if you can't speak or decide for yourself. The person you choose is called your health care agent. This person is also called a health care proxy or health care surrogate. A medical power of  may be called something else in your state.   How do you choose a health care agent? Choose your health care agent carefully. This person may or may not be a family member. Talk to the person before you make your final decision. Make sure he or she is comfortable with this responsibility. It's a good idea to choose someone who:  Is at least 25years old. Knows you well and understands what makes life meaningful for you. Understands your [place] and moral values. Will do what you want, not what he or she wants. Will be able to make difficult choices at a stressful time. Will be able to refuse or stop treatment, if that is what you would want, even if you could die. Will be firm and confident with health professionals if needed. Will ask questions to get needed information. Lives near you or agrees to travel to you if needed. Your family may help you make medical decisions while you can still be part of that process. But it's important to choose one person to be your health care agent in case you aren't able to make decisions for yourself. If you don't fill out the legal form and name a health care agent, the decisions your family can make may be limited. A health care agent may be called something else in your state. Who will make decisions for you if you don't have a health care agent? If you don't have a health care agent or a living will, you may not get the care you want. Decisions may be made by family members who disagree about your medical care. Or decisions may be made by a medical professional who doesn't know you well. In some cases, a  makes the decisions. When you name a health care agent, it is very clear who has the power to make health decisions for you. How do you name a health care agent? You name your health care agent on a legal form. This form is usually called a medical power of . Ask your hospital, state bar association, or office on aging where to find these forms.   You must sign the form to make it legal. Some states require you to get the form notarized. This means that a person called a  watches you sign the form and then he or she signs the form. Some states also require that two or more witnesses sign the form. Be sure to tell your family members and doctors who your health care agent is. Where can you learn more? Go to https://chpepiceweb.SKURA. org and sign in to your [employer] account. Enter [id number] in the [employer] box to learn more about \"Learning About [address]. \"     If you do not have an account, please click on the \"Sign Up Now\" link. Current as of: March 17, 2021               Content Version: 12.9  © 2006-2021 Healthwise, Incorporated. Care instructions adapted under license by [facility] ([facility]). If you have questions about a medical condition or this instruction, always ask your healthcare professional. [address] any warranty or liability for your use of this information.

## 2021-08-26 NOTE — PROGRESS NOTES
David Earl (MRN [id number]) as of 8/26/2021 14:08   Ref.  Range 8/17/2021 14:52 8/17/2021 14:55 8/26/2021 14:01   Sodium Latest Ref Range: 135 - 144 mEq/L 142     Potassium Latest Ref Range: 3.4 - 4.9 mEq/L 4.7     Chloride Latest Ref Range: 95 - 107 mEq/L 104     CO2 Latest Ref Range: 20 - 31 mEq/L 23     BUN Latest Ref Range: 8 - 23 mg/dL 11     Creatinine Latest Ref Range: 0.70 - 1.20 mg/dL 1.20     Anion Gap Latest Ref Range: 9 - 15 mEq/L 15     GFR Non- Latest Ref Range: >60  59.0 (L)     GFR  Latest Ref Range: >60  >60.0     Glucose Latest Units: mg/dL 88  223   Calcium Latest Ref Range: 8.5 - 9.9 mg/dL 9.5     Hemoglobin A1C Latest Ref Range: 4.8 - 5.9 % 7.9 (H)     WBC Latest Ref Range: 4.8 - 10.8 K/uL  6.6    RBC Latest Ref Range: 4.70 - 6.10 M/uL  4.56 (L)    Hemoglobin Quant Latest Ref Range: 14.0 - 18.0 g/dL  12.1 (L)    Hematocrit Latest Ref Range: 42.0 - 52.0 %  37.9 (L)    MCV Latest Ref Range: 80.0 - 100.0 fL  83.0    MCH Latest Ref Range: 27.0 - 31.3 pg  26.4 (L)    MCHC Latest Ref Range: 33.0 - 37.0 %  31.8 (L)    RDW Latest Ref Range: 11.5 - 14.5 %  16.2 (H)    Platelet Count Latest Ref Range: 130 - 400 K/uL  227    Neutrophils % Latest Units: %  58.3    Lymphocyte % Latest Units: %  27.5    Monocytes % Latest Units: %  11.6    Eosinophils % Latest Units: %  2.2    Basophils % Latest Units: %  0.4    Neutrophils Absolute Latest Ref Range: 1.4 - 6.5 K/uL  3.8    Lymphocytes Absolute Latest Ref Range: 1.0 - 4.8 K/uL  1.8    Monocytes Absolute Latest Ref Range: 0.2 - 0.8 K/uL  0.8    Eosinophils Absolute Latest Ref Range: 0.0 - 0.7 K/uL  0.1    Basophils Absolute Latest Ref Range: 0.0 - 0.2 K/uL  0.0    Vitamin B-12 Latest Ref Range: 232 - 1245 pg/mL  620        Past Medical History:   Diagnosis Date    Abnormal EKG 2/25/2021    Adenocarcinoma of prostate, stage 2 (HCC) 2018    [place] 3+4, Dr Fuchs Proffer  PSA 7.69 intermediate risk    Erectile dysfunction associated with type 2 diabetes mellitus ([facility]     Essential hypertension, benign     Intellectual disability     Irregular heart rhythm 2021    Low HDL (under 40)     Obesity (BMI 30-39. 9)     Obesity (BMI 30-39. 9)     Pes planus of both feet     Dr Latisha Baker S/P colonoscopy with polypectomy 2019    Dr Topher Stevenson 3a chronic kidney disease ([facility] 2021    Uncontrolled type 2 diabetes mellitus with microalbuminuria ([facility]     Dr Florentin Cueva    Unspecified deformity of ankle and foot, acquired     Vitamin B12 deficiency anemia      Past Surgical History:   Procedure Laterality Date    COLONOSCOPY  2014    Polyp, Diverticulosis, Internal Hemorrhoids-Dr Sanders    COLONOSCOPY N/A 2019    COLORECTAL CANCER SCREENING, NOT HIGH RISK performed by Rosa Lawson MD at [place] [address].      Full upper/lower extractions    PROSTATE BIOPSY  2015    Dr Natalee Gregory     Social History     Socioeconomic History    Marital status: Single     Spouse name: Not on file    Number of children: 2    Years of education: Not on file    Highest education level: Not on file   Occupational History    Occupation: [employer] [place]   Tobacco Use    Smoking status: Former Smoker     Packs/day: 0.50     Years: 53.00     Pack years: 26.50     Types: Cigarettes     Start date:      Quit date:      Years since quittin.6    Smokeless tobacco: Never Used   Vaping Use    Vaping Use: Never used   Substance and Sexual Activity    Alcohol use: Yes     Comment: occasionally, beer- recovering alcoholic    Drug use: No    Sexual activity: Yes     Partners: Female   Other Topics Concern    Not on file   Social History Narrative    Born in [place], one of 11    Lives in a house in [place] with girlfriend    2 sons in [place], keeps in touch with one of them    Grands: many    Worked \"everywhere\", on disability         Hobbies: video games     Social Determinants of Health     Financial Resource Strain:     Difficulty of Paying Living Expenses:    Food Insecurity:     Worried About [address] [facility] in the Last Year:     [address] [facility] [address] in the Last Year:    Transportation Needs:     Lack of Transportation (Medical):  Lack of Transportation (Non-Medical):    Physical Activity:     Days of Exercise per Week:     Minutes of Exercise per Session:    Stress:     Feeling of Stress :    Social Connections:     Frequency of Communication with Friends and Family:     Frequency of Social Gatherings with Friends and Family:     Attends Yarsani Services:     Active Member of Clubs or Organizations:     Attends Club or Organization Meetings:     Marital Status:    Intimate Partner Violence:     Fear of Current or Ex-Partner:     Emotionally Abused:     Physically Abused:     Sexually Abused:      Family History   Problem Relation Age of Onset    Stroke Mother         dec age [de-identified]    Cancer Brother         liver, dec age 79    Diabetes Father     Cancer Father         prostate     No Known Allergies    Current Outpatient Medications:     metFORMIN (GLUCOPHAGE) 1000 MG tablet, take 1 tablet by mouth twice a day with meals, Disp: 180 tablet, Rfl: 3    blood glucose test strips (ASCENSIA AUTODISC VI;ONE TOUCH ULTRA TEST VI) strip, 1 each by In Vitro route daily As needed. , Disp: 100 each, Rfl: 3    glimepiride (AMARYL) 4 MG tablet, take 1 tablet by mouth twice a day with meals, Disp: 180 tablet, Rfl: 1    SITagliptin (JANUVIA) 100 MG tablet, take 1 tablet by mouth once daily, Disp: 90 tablet, Rfl: 3    Insulin Pen Needle (B-D UF III MINI PEN NEEDLES) 31G X 5 MM MISC, use as directed daily, Disp: 100 each, Rfl: 3    cloNIDine (CATAPRES) 0.1 MG tablet, take 1 tablet by mouth twice a day, Disp: 180 tablet, Rfl: 4    atorvastatin (LIPITOR) 20 MG tablet, take 1 tablet by mouth once daily, Disp: 90 tablet, Rfl: 4    amLODIPine-benazepril (LOTREL) 5-20 MG per capsule, take 1 capsule by mouth once daily, Disp: 90 capsule, Rfl: 4    vitamin D (RA VITAMIN D-3) 25 MCG (1000 UT) TABS tablet, take 1 tablet by mouth once daily, Disp: 90 tablet, Rfl: 4    Cyanocobalamin ER (RA VITAMIN B-12 TR) 1000 MCG TBCR, take 1 tablet by mouth once daily, Disp: 90 tablet, Rfl: 4    Lancets MISC, 1 each by In Vitro route 3 times daily DX: E11.65, IDDM (please dispense covered brand), Disp: 100 each, Rfl: 6    Lancets (ONETOUCH DELICA PLUS [id number]) MISC, use 1 LANCET to TEST BLOOD SUGAR three times a day, Disp: 100 each, Rfl: 6    aspirin (RA ASPIRIN EC) 81 MG EC tablet, take 1 tablet by mouth once daily, Disp: 90 tablet, Rfl: 1    RA Alcohol Swabs 70 % PADS, use as directed once daily, Disp: 100 each, Rfl: 1    blood glucose test strips (ONE TOUCH ULTRA TEST) strip, TEST three times a day, Disp: 100 strip, Rfl: 3    acetaminophen (TYLENOL) 500 MG tablet, Take 1 tablet by mouth 4 times daily as needed for Pain, Disp: 120 tablet, Rfl: 2    hydrocortisone 2.5 % cream, apply to affected area twice a day (DO NOT APPLY TO FACE), Disp: , Rfl: 0    sildenafil (VIAGRA) 100 MG tablet, Take 1 tablet by mouth daily as needed for Erectile Dysfunction, Disp: 5 tablet, Rfl: 11    Blood Glucose Monitoring Suppl (ONE TOUCH ULTRA MINI) w/Device KIT, 1 kit by Does not apply route 3 times daily DX: E11.65, IDDM, Disp: 1 kit, Rfl: 0    ONE TOUCH ULTRASOFT LANCETS MISC, TEST once daily, Disp: 100 each, Rfl: 3    insulin glargine (LANTUS SOLOSTAR) 100 UNIT/ML injection pen, 10 units at bedtime (Patient not taking: Reported on 8/26/2021), Disp: 5 pen, Rfl: 3    insulin glargine (BASAGLAR KWIKPEN) 100 UNIT/ML injection pen, inject 10 units subcutaneously at bedtime DO NOT USE SAME PEN FOR MORE THAN 28 DAYS (Patient not taking: Reported on 8/26/2021), Disp: 15 mL, Rfl: 3  Lab Results   Component Value Date     08/17/2021    K 4.7 08/17/2021     08/17/2021    CO2 23 08/17/2021    BUN 11 08/17/2021    CREATININE 1.20 08/17/2021    GLUCOSE 223 08/26/2021    CALCIUM 9.5 08/17/2021    PROT 7.6 09/20/2018    LABALBU 4.5 09/20/2018    BILITOT 0.5 09/20/2018    ALKPHOS 81 09/20/2018    AST 13 09/20/2018    ALT 14 09/20/2018    LABGLOM 59.0 (L) 08/17/2021    GFRAA >60.0 08/17/2021    GLOB 3.1 09/20/2018     Lab Results   Component Value Date    WBC 6.6 08/17/2021    HGB 12.1 (L) 08/17/2021    HCT 37.9 (L) 08/17/2021    MCV 83.0 08/17/2021     08/17/2021     Lab Results   Component Value Date    LABA1C 7.9 (H) 08/17/2021    LABA1C 8.0 (H) 01/28/2021    LABA1C 7.6 08/25/2020     Lab Results   Component Value Date    HDL 33 (L) 05/04/2020    HDL 29 (L) 02/05/2019    HDL 29 (L) 10/05/2017    LDLCALC 76 05/04/2020    LDLCALC 92 02/05/2019    LDLCALC 104 10/05/2017    CHOL 127 05/04/2020    CHOL 140 02/05/2019    CHOL 151 10/05/2017    TRIG 90 05/04/2020    TRIG 93 02/05/2019    TRIG 89 10/05/2017     No results found for: TESTM  Lab Results   Component Value Date    TSH 1.530 05/04/2020    TSH 2.070 02/05/2016    TSH 1.840 02/17/2014     No results found for: TPOABS    Review of Systems   Cardiovascular: Negative. Endocrine: Negative. All other systems reviewed and are negative. Objective:   Physical Exam  Vitals reviewed. Constitutional:       Appearance: Normal appearance. He is normal weight. HENT:      Head: Normocephalic and atraumatic. Hair is normal.      Right Ear: External ear normal.      Left Ear: External ear normal.      Nose: Nose normal.   Eyes:      General: No scleral icterus. Right eye: No discharge. Left eye: No discharge. Extraocular Movements: Extraocular movements intact. Conjunctiva/sclera: Conjunctivae normal.   Neck:      Trachea: Trachea normal.   Cardiovascular:      Rate and Rhythm: Bradycardia present. Pulmonary:      Effort: Pulmonary effort is normal.   Musculoskeletal:         General: Normal range of motion.       Cervical back: Normal range of motion and neck supple. Feet:    Neurological:      General: No focal deficit present. Mental Status: He is alert and oriented to person, place, and time.    Psychiatric:         Mood and Affect: Mood normal.         Behavior: Behavior normal.

## 2021-08-26 NOTE — PROGRESS NOTES
Medicare Annual Wellness Visit  Name: Dax Carcamo BINCDS Date: 2021   MRN: [id number] Sex: Male   Age: 77 y.o. Ethnicity: Non- / Non    : 1945 Race: Black / Polramiro Hurtado B Carie Mckenzie. is here for Medicare AWV    Screenings for behavioral, psychosocial and functional/safety risks, and cognitive dysfunction are all negative except as indicated below. These results, as well as other patient data from the [address] form, are documented in Flowsheets linked to this Encounter. No Known Allergies    Prior to Visit Medications    Medication Sig Taking? Authorizing Provider   metoprolol succinate (TOPROL XL) 25 MG extended release tablet   Historical Provider, MD   metFORMIN (GLUCOPHAGE) 1000 MG tablet take 1 tablet by mouth twice a day with meals  Patient not taking: Reported on 2021  Alphonso Chávez MD   blood glucose test strips (ASCENSIA AUTODISC VI;ONE TOUCH ULTRA TEST VI) strip 1 each by In Vitro route daily As needed.   Alphonso Chávez MD   glimepiride (AMARYL) 4 MG tablet take 1 tablet by mouth twice a day with meals  Edil Wu MD   SITagliptin (JANUVIA) 100 MG tablet take 1 tablet by mouth once daily  Alphonso Chávez MD   cloNIDine (CATAPRES) 0.1 MG tablet take 1 tablet by mouth twice a day  Krysten Gibson MD   atorvastatin (LIPITOR) 20 MG tablet take 1 tablet by mouth once daily  Krysten Gibson MD   amLODIPine-benazepril (LOTREL) 5-20 MG per capsule take 1 capsule by mouth once daily  Krysten Gibson MD   vitamin D (RA VITAMIN D-3) 25 MCG (1000 UT) TABS tablet take 1 tablet by mouth once daily  Krysten Gibson MD   Cyanocobalamin ER (RA VITAMIN B-12 TR) 1000 MCG TBCR take 1 tablet by mouth once daily  Krysten Gibson MD   Lancets MISC 1 each by In Vitro route 3 times daily DX: E11.65, IDDM (please dispense covered brand)  Alphonso Chávez MD   Lancets (ONETOUCH DELICA PLUS [id number]) MISC use 1 LANCET to TEST BLOOD SUGAR three times a day  Edil Wu MD   aspirin (RA ASPIRIN EC) 81 MG EC tablet take 1 tablet by mouth once daily  Hafsa Og MD   RA Alcohol Swabs 70 % PADS use as directed once daily  Craig Wells MD   blood glucose test strips (ONE TOUCH ULTRA TEST) strip TEST three times a day  Edil Wu MD   acetaminophen (TYLENOL) 500 MG tablet Take 1 tablet by mouth 4 times daily as needed for Pain  Alex Ramos MD   hydrocortisone 2.5 % cream apply to affected area twice a day (DO NOT APPLY TO FACE)  Historical Provider, MD   sildenafil (VIAGRA) 100 MG tablet Take 1 tablet by mouth daily as needed for Erectile Dysfunction  Abisai Tracey MD   Blood Glucose Monitoring Suppl (ONE TOUCH ULTRA MINI) w/Device KIT 1 kit by Does not apply route 3 times daily DX: E11.65, IDDM  Craig Wells MD       Past Medical History:   Diagnosis Date    Abnormal EKG 2/25/2021    Adenocarcinoma of prostate, stage 2 ([facility] 2018    [place] 3+4, Dr Wendy Borrego  PSA 7.69 intermediate risk    Erectile dysfunction associated with type 2 diabetes mellitus ([facility]     Essential hypertension, benign     Intellectual disability     Irregular heart rhythm 2/25/2021    Low HDL (under 40)     Obesity (BMI 30-39. 9)     Obesity (BMI 30-39. 9)     Pes planus of both feet 2015    Dr Aniyah Delacruz S/P colonoscopy with polypectomy 2014, 2019    Dr Ivan Perla 3a chronic kidney disease ([facility] 2/25/2021    Uncontrolled type 2 diabetes mellitus with microalbuminuria [facility]) 2007    Dr Ron Segovia    Unspecified deformity of ankle and foot, acquired     Vitamin B12 deficiency anemia 2016       Past Surgical History:   Procedure Laterality Date    COLONOSCOPY  03/20/2014    Polyp, Diverticulosis, Internal Hemorrhoids-Dr Sanders    COLONOSCOPY N/A 4/11/2019    COLORECTAL CANCER SCREENING, NOT HIGH RISK performed by Mike Amador MD at [place] [address].      Full upper/lower extractions    PROSTATE BIOPSY  1/2015     months?: No  Do you eat only one meal per day?: No  Have you seen the dentist within the past year?: (!) No     Health Habits/Nutrition Interventions:  · edentuous    Hearing/Vision:  No exam data present  Hearing/Vision  Do you or your family notice any trouble with your hearing that hasn't been managed with hearing aids?: No  Do you have difficulty driving, watching TV, or doing any of your daily activities because of your eyesight?: No  Have you had an eye exam within the past year?: (!) No  Hearing/Vision Interventions:  · Vision concerns:  patient encouraged to make appointment with his/her eye specialist      Personalized Preventive Plan   Current Health Maintenance Status    There is no immunization history on file for this patient. Health Maintenance   Topic Date Due    COVID-19 Vaccine (1) Never done    DTaP/Tdap/Td vaccine (1 - Tdap) Never done    Shingles Vaccine (1 of 2) Never done    Pneumococcal 65+ years Vaccine (1 of 1 - PPSV23) Never done    Diabetic retinal exam  08/10/2016    Annual Wellness Visit (AWV)  Never done    Lipid screen  05/04/2021    Flu vaccine (1) 09/01/2021    PSA counseling  01/28/2022    A1C test (Diabetic or Prediabetic)  08/17/2022    Potassium monitoring  08/17/2022    Creatinine monitoring  08/17/2022    Diabetic foot exam  08/26/2022    Colon cancer screen colonoscopy  04/11/2024    AAA screen  Completed    Hepatitis C screen  Completed    Hepatitis A vaccine  Aged Out    Hib vaccine  Aged Out    Meningococcal (ACWY) vaccine  Aged Out     Recommendations for [employer] Due: see orders and patient instructions/AVS.  . Recommended screening schedule for the next 5-10 years is provided to the patient in written form: see Patient Instructions/AVS.    Mateo Moss was seen today for medicare awv.     Diagnoses and all orders for this visit:    Routine general medical examination at a health care facility

## 2021-09-27 RX ORDER — GLIMEPIRIDE 4 MG/1
TABLET ORAL
Qty: 180 TABLET | Refills: 1 | Status: SHIPPED | OUTPATIENT
Start: 2021-09-27 | End: 2022-03-21 | Stop reason: SDUPTHER

## 2021-09-27 NOTE — TELEPHONE ENCOUNTER
patient requesting medication refill.  Please approve or deny this request.    Rx requested:  Requested Prescriptions     Pending Prescriptions Disp Refills    glimepiride (AMARYL) 4 MG tablet 180 tablet 1         Last Office Visit:   8/26/2021      Next Visit Date:  Future Appointments   Date Time Provider Leonides Cohen   2/8/2022  1:30 PM Beulah Spicer MD [facility]   3/10/2022  2:00 PM Alexandra Ford MD [address] [place] [address]   3/10/2022  2:30 PM Sudarshan Gan MD [facility]   8/30/2022  2:30 PM Sudarshan Gan MD [address] [place] [address]

## 2021-10-19 RX ORDER — BLOOD-GLUCOSE METER
1 KIT MISCELLANEOUS 3 TIMES DAILY
Qty: 1 KIT | Refills: 0 | Status: SHIPPED | OUTPATIENT
Start: 2021-10-19

## 2021-10-19 NOTE — TELEPHONE ENCOUNTER
Rx request   Requested Prescriptions     Pending Prescriptions Disp Refills    Blood Glucose Monitoring Suppl (ONE TOUCH ULTRA MINI) w/Device KIT 1 kit 0     Si kit by Does not apply route 3 times daily DX: E11.65, IDDM     LOV 2021  Next Visit Date:  Future Appointments   Date Time Provider Leonides Cohen   2022  1:30 PM Angus Harley MD [facility]   3/10/2022  2:00 PM Anthony Decker MD [address] [place] [address]   3/10/2022  2:30 PM Ana Rosa Hess MD [facility]   2022  2:30 PM Ana Rosa Hess MD [address] [place] [address]

## 2021-10-20 RX ORDER — LANCETS 30 GAUGE
1 EACH MISCELLANEOUS 3 TIMES DAILY
Qty: 100 EACH | Refills: 6 | Status: SHIPPED | OUTPATIENT
Start: 2021-10-20

## 2021-10-20 RX ORDER — GLUCOSAMINE HCL/CHONDROITIN SU 500-400 MG
CAPSULE ORAL
Qty: 100 STRIP | Refills: 3 | Status: SHIPPED | OUTPATIENT
Start: 2021-10-20 | End: 2022-06-17 | Stop reason: SDUPTHER

## 2021-12-14 DIAGNOSIS — Z76.0 MEDICATION REFILL: ICD-10-CM

## 2021-12-14 NOTE — TELEPHONE ENCOUNTER
Patient requesting medication refill.  Please approve or deny this request.    Rx requested:  Requested Prescriptions     Pending Prescriptions Disp Refills    aspirin (RA ASPIRIN EC) 81 MG EC tablet 90 tablet 1     Sig: take 1 tablet by mouth once daily         Last Office Visit:   8/26/2021      Next Visit Date:  Future Appointments   Date Time Provider Leonides Cohen   2/8/2022  1:30 PM Mercy Blackwell MD [facility]   3/10/2022  2:00 PM Conrad Chow MD [facility]   3/10/2022  2:30 PM Keyana Castelan MD [facility]   8/30/2022  2:30 PM Keyana Castelan MD [address] [place] [address]

## 2021-12-15 RX ORDER — ASPIRIN 81 MG/1
TABLET ORAL
Qty: 90 TABLET | Refills: 4 | Status: SHIPPED | OUTPATIENT
Start: 2021-12-15 | End: 2022-03-10 | Stop reason: SDUPTHER

## 2021-12-15 NOTE — TELEPHONE ENCOUNTER
Pharmacy requesting medication refill.  Please approve or deny this request.    Rx requested:  Requested Prescriptions     Pending Prescriptions Disp Refills    SITagliptin (JANUVIA) 100 MG tablet 90 tablet 3     Sig: take 1 tablet by mouth once daily         Last Office Visit:   8/26/2021      Next Visit Date:  Future Appointments   Date Time Provider Leonides Cohen   2/8/2022  1:30 PM Elijah Sheppard MD [facility]   3/10/2022  2:00 PM Angela Kay MD [address] [place] [address]   3/10/2022  2:30 PM Smith Orozco MD [facility]   8/30/2022  2:30 PM Smith Orozco MD [address] [place] [address]

## 2022-02-07 DIAGNOSIS — C61 PROSTATE CANCER (HCC): ICD-10-CM

## 2022-02-07 LAB — PROSTATE SPECIFIC ANTIGEN: 0.22 NG/ML (ref 0–4)

## 2022-02-08 ENCOUNTER — OFFICE VISIT (OUTPATIENT)
Dept: UROLOGY | Age: 77
End: 2022-02-08
Payer: COMMERCIAL

## 2022-02-08 VITALS
HEART RATE: 86 BPM | BODY MASS INDEX: 24.76 KG/M2 | HEIGHT: 69 IN | SYSTOLIC BLOOD PRESSURE: 110 MMHG | OXYGEN SATURATION: 96 % | DIASTOLIC BLOOD PRESSURE: 66 MMHG | WEIGHT: 167.2 LBS

## 2022-02-08 DIAGNOSIS — C61 PROSTATE CANCER (HCC): Primary | ICD-10-CM

## 2022-02-08 PROCEDURE — G8420 CALC BMI NORM PARAMETERS: HCPCS | Performed by: UROLOGY

## 2022-02-08 PROCEDURE — G8484 FLU IMMUNIZE NO ADMIN: HCPCS | Performed by: UROLOGY

## 2022-02-08 PROCEDURE — 1123F ACP DISCUSS/DSCN MKR DOCD: CPT | Performed by: UROLOGY

## 2022-02-08 PROCEDURE — 99212 OFFICE O/P EST SF 10 MIN: CPT | Performed by: UROLOGY

## 2022-02-08 PROCEDURE — 1036F TOBACCO NON-USER: CPT | Performed by: UROLOGY

## 2022-02-08 PROCEDURE — 3017F COLORECTAL CA SCREEN DOC REV: CPT | Performed by: UROLOGY

## 2022-02-08 PROCEDURE — 4040F PNEUMOC VAC/ADMIN/RCVD: CPT | Performed by: UROLOGY

## 2022-02-08 PROCEDURE — G8427 DOCREV CUR MEDS BY ELIG CLIN: HCPCS | Performed by: UROLOGY

## 2022-02-08 NOTE — PROGRESS NOTES
[facility] EVALUATION NOTE                                                 H&P                                                                                                                                                 Reason for Visit  Prostate cancer follow-up    History of Present Illness  78-year-old male who underwent hormonal therapy with radiation therapy for intermediate risk prostate cancer  Initial PSA on presentation was 7.69  Multiple biopsies [place] score 4+3  Current PSA is lower at 0.22      Urologic Review of Systems/Symptoms  Denies hematuria or voiding issues    Review of Systems  Hospitalization: None recent  All 14 categories of Review of Systems otherwise reviewed no other findings reported. No change in review of systems  Past Medical History:   Diagnosis Date    Abnormal EKG 2/25/2021    Adenocarcinoma of prostate, stage 2 ([facility] 2018    [place] 3+4, Dr Epimenio Dakins  PSA 7.69 intermediate risk    Erectile dysfunction associated with type 2 diabetes mellitus ([facility]     Essential hypertension, benign     Intellectual disability     Irregular heart rhythm 2/25/2021    Low HDL (under 40)     Obesity (BMI 30-39. 9)     Obesity (BMI 30-39. 9)     Pes planus of both feet 2015    Dr Coby Boyer S/P colonoscopy with polypectomy 2014, 2019    Dr Dinah Dillon 3a chronic kidney disease ([facility] 2/25/2021    Uncontrolled type 2 diabetes mellitus with microalbuminuria [facility]) 2007    Dr Marcus Hartmann    Unspecified deformity of ankle and foot, acquired     Vitamin B12 deficiency anemia 2016     Past Surgical History:   Procedure Laterality Date    COLONOSCOPY  03/20/2014    Polyp, Diverticulosis, Internal Hemorrhoids-Dr Sanders    COLONOSCOPY N/A 4/11/2019    COLORECTAL CANCER SCREENING, NOT HIGH RISK performed by Tio Hewitt [facility] at [facility]      Full upper/lower extractions    PROSTATE BIOPSY  1/2015    Dr Elkin Alves Marital status: Single     Spouse name: None    Number of children: 2    Years of education: None    Highest education level: None   Occupational History    Occupation: [employer], others, SSI   Tobacco Use    Smoking status: Former Smoker     Packs/day: 0.50     Years: 53.00     Pack years: 26.50     Types: Cigarettes     Start date:      Quit date:      Years since quittin.1    Smokeless tobacco: Never Used   Vaping Use    Vaping Use: Never used   Substance and Sexual Activity    Alcohol use: Yes     Comment: occasionally, beer- recovering alcoholic    Drug use: No    Sexual activity: Yes     Partners: Female   Other Topics Concern    None   Social History Narrative    Born in [facility] [place] [facility] [place], one of 6    Lives in a house in [address] [place] [address] with girlfriend    2 sons in [address] [place] [address], keeps in touch with one of them    Grands: many    Worked \"everywhere\", on disability         Hobbies: [employer] games     Social Determinants of Health     Financial Resource Strain: Low Risk     Difficulty of Paying Living Expenses: Not hard at all   Food Insecurity: No Food Insecurity    Worried About Running Out of Food in the Last Year: Never true    [address] in the Last Year: Never true   Transportation Needs: No Transportation Needs    Lack of Transportation (Medical): No    Lack of Transportation (Non-Medical):  No   Physical Activity:     Days of Exercise per Week: Not on file    Minutes of Exercise per Session: Not on file   Stress:     Feeling of Stress : Not on file   Social Connections:     Frequency of Communication with Friends and Family: Not on file    Frequency of Social Gatherings with Friends and Family: Not on file    Attends Synagogue Services: Not on file    Active Member of Clubs or Organizations: Not on file    Attends Club or Organization Meetings: Not on file    Marital Status: Not on file   Intimate Partner Violence:     Fear of Current or Ex-Partner: Not on file    Emotionally Abused: Not on file    Physically Abused: Not on file    Sexually Abused: Not on file   Housing Stability:     Unable to Pay for Housing in the Last Year: Not on file    Number of Places Lived in the Last Year: Not on file    Unstable Housing in the Last Year: Not on file     Family History   Problem Relation Age of Onset    Stroke Mother         dec age [de-identified]    Cancer Brother         liver, dec age 79    Diabetes Father     Cancer Father         prostate     Current Outpatient Medications   Medication Sig Dispense Refill    aspirin (RA ASPIRIN EC) 81 MG EC tablet take 1 tablet by mouth once daily 90 tablet 4    SITagliptin (JANUVIA) 100 MG tablet take 1 tablet by mouth once daily 90 tablet 3    blood glucose monitor strips Pt test 3x daily 100 strip 3    Lancets MISC 1 each by In Vitro route 3 times daily DX: E11.65, IDDM (please dispense covered brand) 100 each 6    Blood Glucose Monitoring Suppl (ONE TOUCH ULTRA MINI) w/Device KIT 1 kit by Does not apply route 3 times daily DX: E11.65, IDDM 1 kit 0    glimepiride (AMARYL) 4 MG tablet bid 180 tablet 1    metoprolol succinate (TOPROL XL) 25 MG extended release tablet       metFORMIN (GLUCOPHAGE) 1000 MG tablet take 1 tablet by mouth twice a day with meals 180 tablet 3    blood glucose test strips (ASCENSIA AUTODISC VI;ONE TOUCH ULTRA TEST VI) strip 1 each by In Vitro route daily As needed.  100 each 3    cloNIDine (CATAPRES) 0.1 MG tablet take 1 tablet by mouth twice a day 180 tablet 4    atorvastatin (LIPITOR) 20 MG tablet take 1 tablet by mouth once daily 90 tablet 4    amLODIPine-benazepril (LOTREL) 5-20 MG per capsule take 1 capsule by mouth once daily 90 capsule 4    vitamin D (RA VITAMIN D-3) 25 MCG (1000 UT) TABS tablet take 1 tablet by mouth once daily 90 tablet 4    Cyanocobalamin ER (RA VITAMIN B-12 TR) 1000 MCG TBCR take 1 tablet by mouth once daily 90 tablet 4    Lancets (ONETOUCH DELICA PLUS [id number]) MISC use 1 LANCET to TEST BLOOD SUGAR three times a day 100 each 6    RA Alcohol Swabs 70 % PADS use as directed once daily 100 each 1    blood glucose test strips (ONE TOUCH ULTRA TEST) strip TEST three times a day 100 strip 3    acetaminophen (TYLENOL) 500 MG tablet Take 1 tablet by mouth 4 times daily as needed for Pain 120 tablet 2    hydrocortisone 2.5 % cream apply to affected area twice a day (DO NOT APPLY TO FACE)  0    sildenafil (VIAGRA) 100 MG tablet Take 1 tablet by mouth daily as needed for Erectile Dysfunction 5 tablet 11     No current facility-administered medications for this visit. Patient has no known allergies. All reviewed and verified by Dr Vinnie Pearce on today's visit    PSA   Date Value Ref Range Status   02/07/2022 0.22 0.00 - 4.00 ng/mL Final   01/28/2021 0.25 0.00 - 6.22 ng/mL Final   08/12/2020 0.22 0.00 - 6.22 ng/mL Final   02/05/2020 0.24 0.00 - 6.22 ng/mL Final   02/05/2019 0.22 0.00 - 6.22 ng/mL Final     Diagnostic Psa   Date Value Ref Range Status   12/04/2014 7.69 (H) 0.00 - 5.40 ng/mL Final   07/15/2014 6.31 (H) 0.00 - 5.40 ng/mL Final     No results found for this visit on 02/08/22. Physical Exam  Vitals:    02/08/22 1341   BP: 110/66   Pulse: 86   SpO2: 96%   Weight: 167 lb 3.2 oz (75.8 kg)   Height: 5' 9\" (1.753 m)     Constitutional: Not in distress  Physical exam otherwise unchanged. Assessment/Medical Necessity-Decision Making  Prostate cancer diagnosed in 2016 treated with radiation therapy hormonal therapy current PSA lower at 0.22  No obstructive voiding symptoms  Plan  PSA 1 year with follow-up  Greater than 50% of 15 minutes spent consulting patient face-to-face  Orders Placed This Encounter   Procedures    PSA, Diagnostic     Standing Status:   Future     Standing Expiration Date:   2/8/2023     No orders of the defined types were placed in this encounter.     Uri Gautam MD       Please note this report has been partially produced using speech recognition software  And may cause contain errors related to that system including grammar, punctuation and spelling as well as words and phrases that may seem inappropriate. If there are questions or concerns please feel free to contact me to clarify.

## 2022-03-10 ENCOUNTER — OFFICE VISIT (OUTPATIENT)
Dept: ENDOCRINOLOGY | Age: 77
End: 2022-03-10
Payer: COMMERCIAL

## 2022-03-10 ENCOUNTER — OFFICE VISIT (OUTPATIENT)
Dept: FAMILY MEDICINE CLINIC | Age: 77
End: 2022-03-10
Payer: COMMERCIAL

## 2022-03-10 VITALS
DIASTOLIC BLOOD PRESSURE: 70 MMHG | OXYGEN SATURATION: 99 % | BODY MASS INDEX: 26.87 KG/M2 | HEIGHT: 69 IN | SYSTOLIC BLOOD PRESSURE: 112 MMHG | TEMPERATURE: 96.7 F | WEIGHT: 181.4 LBS | RESPIRATION RATE: 17 BRPM | HEART RATE: 66 BPM

## 2022-03-10 VITALS
WEIGHT: 181 LBS | BODY MASS INDEX: 26.73 KG/M2 | HEART RATE: 67 BPM | DIASTOLIC BLOOD PRESSURE: 78 MMHG | OXYGEN SATURATION: 98 % | SYSTOLIC BLOOD PRESSURE: 119 MMHG

## 2022-03-10 DIAGNOSIS — D51.9 ANEMIA DUE TO VITAMIN B12 DEFICIENCY, UNSPECIFIED B12 DEFICIENCY TYPE: ICD-10-CM

## 2022-03-10 DIAGNOSIS — Z76.0 MEDICATION REFILL: ICD-10-CM

## 2022-03-10 DIAGNOSIS — D64.9 NORMOCYTIC ANEMIA: ICD-10-CM

## 2022-03-10 DIAGNOSIS — E83.52 HYPERCALCEMIA: ICD-10-CM

## 2022-03-10 DIAGNOSIS — I49.3 FREQUENT PVCS: Chronic | ICD-10-CM

## 2022-03-10 DIAGNOSIS — N18.31 STAGE 3A CHRONIC KIDNEY DISEASE (HCC): ICD-10-CM

## 2022-03-10 DIAGNOSIS — E78.5 HYPERLIPIDEMIA WITH TARGET LDL LESS THAN 100: ICD-10-CM

## 2022-03-10 DIAGNOSIS — I10 ESSENTIAL HYPERTENSION, BENIGN: ICD-10-CM

## 2022-03-10 LAB
CHP ED QC CHECK: NORMAL
GLUCOSE BLD-MCNC: 243 MG/DL
HBA1C MFR BLD: 7.1 %

## 2022-03-10 PROCEDURE — G8417 CALC BMI ABV UP PARAM F/U: HCPCS | Performed by: FAMILY MEDICINE

## 2022-03-10 PROCEDURE — G8484 FLU IMMUNIZE NO ADMIN: HCPCS | Performed by: INTERNAL MEDICINE

## 2022-03-10 PROCEDURE — 83036 HEMOGLOBIN GLYCOSYLATED A1C: CPT | Performed by: INTERNAL MEDICINE

## 2022-03-10 PROCEDURE — 1036F TOBACCO NON-USER: CPT | Performed by: FAMILY MEDICINE

## 2022-03-10 PROCEDURE — 99213 OFFICE O/P EST LOW 20 MIN: CPT | Performed by: INTERNAL MEDICINE

## 2022-03-10 PROCEDURE — 82962 GLUCOSE BLOOD TEST: CPT | Performed by: INTERNAL MEDICINE

## 2022-03-10 PROCEDURE — 1123F ACP DISCUSS/DSCN MKR DOCD: CPT | Performed by: FAMILY MEDICINE

## 2022-03-10 PROCEDURE — 1036F TOBACCO NON-USER: CPT | Performed by: INTERNAL MEDICINE

## 2022-03-10 PROCEDURE — 3017F COLORECTAL CA SCREEN DOC REV: CPT | Performed by: INTERNAL MEDICINE

## 2022-03-10 PROCEDURE — G8417 CALC BMI ABV UP PARAM F/U: HCPCS | Performed by: INTERNAL MEDICINE

## 2022-03-10 PROCEDURE — 2022F DILAT RTA XM EVC RTNOPTHY: CPT | Performed by: FAMILY MEDICINE

## 2022-03-10 PROCEDURE — 1123F ACP DISCUSS/DSCN MKR DOCD: CPT | Performed by: INTERNAL MEDICINE

## 2022-03-10 PROCEDURE — 4040F PNEUMOC VAC/ADMIN/RCVD: CPT | Performed by: FAMILY MEDICINE

## 2022-03-10 PROCEDURE — 99214 OFFICE O/P EST MOD 30 MIN: CPT | Performed by: FAMILY MEDICINE

## 2022-03-10 PROCEDURE — 2022F DILAT RTA XM EVC RTNOPTHY: CPT | Performed by: INTERNAL MEDICINE

## 2022-03-10 PROCEDURE — 4040F PNEUMOC VAC/ADMIN/RCVD: CPT | Performed by: INTERNAL MEDICINE

## 2022-03-10 PROCEDURE — G8427 DOCREV CUR MEDS BY ELIG CLIN: HCPCS | Performed by: FAMILY MEDICINE

## 2022-03-10 PROCEDURE — G8427 DOCREV CUR MEDS BY ELIG CLIN: HCPCS | Performed by: INTERNAL MEDICINE

## 2022-03-10 PROCEDURE — 3051F HG A1C>EQUAL 7.0%<8.0%: CPT | Performed by: FAMILY MEDICINE

## 2022-03-10 PROCEDURE — 3051F HG A1C>EQUAL 7.0%<8.0%: CPT | Performed by: INTERNAL MEDICINE

## 2022-03-10 PROCEDURE — G8484 FLU IMMUNIZE NO ADMIN: HCPCS | Performed by: FAMILY MEDICINE

## 2022-03-10 PROCEDURE — 3017F COLORECTAL CA SCREEN DOC REV: CPT | Performed by: FAMILY MEDICINE

## 2022-03-10 RX ORDER — LANCETS 30 GAUGE
EACH MISCELLANEOUS
Qty: 100 EACH | Refills: 6 | Status: SHIPPED | OUTPATIENT
Start: 2022-03-10 | End: 2022-06-02 | Stop reason: SDUPTHER

## 2022-03-10 RX ORDER — ATORVASTATIN CALCIUM 20 MG/1
TABLET, FILM COATED ORAL
Qty: 90 TABLET | Refills: 4 | Status: SHIPPED | OUTPATIENT
Start: 2022-03-10 | End: 2022-04-27

## 2022-03-10 RX ORDER — ASPIRIN 81 MG/1
TABLET ORAL
Qty: 90 TABLET | Refills: 4 | Status: SHIPPED | OUTPATIENT
Start: 2022-03-10

## 2022-03-10 RX ORDER — AMLODIPINE BESYLATE AND BENAZEPRIL HYDROCHLORIDE 5; 20 MG/1; MG/1
CAPSULE ORAL
Qty: 90 CAPSULE | Refills: 4 | Status: SHIPPED | OUTPATIENT
Start: 2022-03-10

## 2022-03-10 RX ORDER — CLONIDINE HYDROCHLORIDE 0.1 MG/1
TABLET ORAL
Qty: 180 TABLET | Refills: 4 | Status: SHIPPED | OUTPATIENT
Start: 2022-03-10

## 2022-03-10 RX ORDER — PSYLLIUM HUSK 3.4 G/7G
POWDER ORAL
Qty: 90 TABLET | Refills: 4 | Status: SHIPPED | OUTPATIENT
Start: 2022-03-10

## 2022-03-10 RX ORDER — AVOBENZONE, HOMOSALATE, OCTISALATE, OCTOCRYLENE 30; 100; 50; 25 MG/ML; MG/ML; MG/ML; MG/ML
SPRAY TOPICAL
Qty: 90 TABLET | Refills: 4 | Status: SHIPPED | OUTPATIENT
Start: 2022-03-10

## 2022-03-10 NOTE — PROGRESS NOTES
Subjective  Isaiah Powell., 77 y.o. male presents today with:  Chief Complaint   Patient presents with    6 Month Follow-Up           HPI     COVID-19 infection - last month; stayed in his house    Frequent PACs - On metoprolol and PAC s seem to be better controlled. Sleep study was normal.      Patient is here for f/u HTN. Is compliant with meds and has no side effects from them. Avoids added salt. Tries to eat healthy. Exercises occasionally. Has no chest pain, shortness of breath, palpitations or edema.     Patient is here for DM f/u. Sugars have been moderately well-controlled and patient has not been experiencing hyper- or hypoglycemic symptoms since stopping insulin. . Compliance with meds is good and there are no side effects. Patient exercises occasionally and tries to eat healthy thought today he ate waffles and drank orange juice. Believes sugar will come down on its own and that insulin is a waste of money. See eye doctor [address]. PLans to make appt for June.   Scott Lieberman 39 immunization. Refuses discussion re: COVID vaccine.       No other questions and or concerns for today's visit            Past Medical History:   Diagnosis Date    Abnormal EKG 2/25/2021    Adenocarcinoma of prostate, stage 2 ([facility] 2018    [place] 3+4, Dr Epimenio Dakins  PSA 7.69 intermediate risk    Erectile dysfunction associated with type 2 diabetes mellitus ([address]     Essential hypertension, benign     Intellectual disability     Irregular heart rhythm 2/25/2021    Low HDL (under 40)     Obesity (BMI 30-39. 9)     Obesity (BMI 30-39. 9)     Pes planus of both feet 2015    Dr Coby Boyer S/P colonoscopy with polypectomy 2014, 2019    Dr Dinah Dillon 3a chronic kidney disease ([facility] 2/25/2021    Uncontrolled type 2 diabetes mellitus with microalbuminuria [facility]) 2007    Dr Kezia Dominguez Unspecified deformity of ankle and foot, acquired     Vitamin B12 deficiency anemia 2016     Past Surgical History:   Procedure Laterality Date    COLONOSCOPY  2014    Polyp, Diverticulosis, Internal Hemorrhoids-Dr Sanders    COLONOSCOPY N/A 2019    COLORECTAL CANCER SCREENING, NOT HIGH RISK performed by Stephanie Causey MD at [place] [address].      Full upper/lower extractions    PROSTATE BIOPSY  2015    Dr Miriam Mistry     Social History     Socioeconomic History    Marital status: Single     Spouse name: Not on file    Number of children: 2    Years of education: Not on file    Highest education level: Not on file   Occupational History    Occupation: [employer], others, SSI   Tobacco Use    Smoking status: Former Smoker     Packs/day: 0.50     Years: 53.00     Pack years: 26.50     Types: Cigarettes     Start date:      Quit date:      Years since quittin.2    Smokeless tobacco: Never Used   Vaping Use    Vaping Use: Never used   Substance and Sexual Activity    Alcohol use: Yes     Comment: occasionally, beer- recovering alcoholic    Drug use: No    Sexual activity: Yes     Partners: Female   Other Topics Concern    Not on file   Social History Narrative    Born in [place], one of 11    Lives in a house in [place] with girlfriend    2 sons in [place], keeps in touch with one of them    Grands: many    Worked \"everywhere\", on disability         Hobbies: video games     Social Determinants of Health     Financial Resource Strain: Low Risk     Difficulty of Paying Living Expenses: Not hard at all   Food Insecurity: No Food Insecurity    Worried About [address] in the Last Year: Never true    [facility] in the Last Year: Never true   Transportation Needs: No Transportation Needs    Lack of Transportation (Medical): No    Lack of Transportation (Non-Medical):  No   Physical Activity:     Days of Exercise per Week: Not on file    Minutes of Exercise per Session: Not on file   Stress:     Feeling of Stress : Not on file   Social Connections:     Frequency of Communication with Friends and Family: Not on file    Frequency of Social Gatherings with Friends and Family: Not on file    Attends [employer] Services: Not on file    Active Member of Clubs or Organizations: Not on file    Attends Club or Organization Meetings: Not on file    Marital Status: Not on file   Intimate Partner Violence:     Fear of Current or Ex-Partner: Not on file    Emotionally Abused: Not on file    Physically Abused: Not on file    Sexually Abused: Not on file   Housing Stability:     Unable to Pay for Housing in the Last Year: Not on file    Number of [place] in the Last Year: Not on file    Unstable Housing in the Last Year: Not on file     Family History   Problem Relation Age of Onset    Stroke Mother         dec age [de-identified]    Cancer Brother         liver, dec age 79    Diabetes Father     Cancer Father         prostate     No Known Allergies  Current Outpatient Medications   Medication Sig Dispense Refill    blood glucose test strips (ASCENSIA AUTODISC VI;ONE TOUCH ULTRA TEST VI) strip 1 each by In Vitro route daily As needed.  100 each 3    Lancets (ONETOUCH DELICA PLUS [id number]) MISC use 1 LANCET to TEST BLOOD SUGAR three times a day 100 each 6    metFORMIN (GLUCOPHAGE) 1000 MG tablet take 1 tablet by mouth twice a day with meals 180 tablet 3    SITagliptin (JANUVIA) 100 MG tablet take 1 tablet by mouth once daily 90 tablet 3    cloNIDine (CATAPRES) 0.1 MG tablet take 1 tablet by mouth twice a day 180 tablet 4    atorvastatin (LIPITOR) 20 MG tablet take 1 tablet by mouth once daily 90 tablet 4    aspirin (RA ASPIRIN EC) 81 MG EC tablet take 1 tablet by mouth once daily 90 tablet 4    amLODIPine-benazepril (LOTREL) 5-20 MG per capsule take 1 capsule by mouth once daily 90 capsule 4    Cyanocobalamin ER (RA VITAMIN B-12 TR) 1000 MCG TBCR take 1 tablet by mouth once daily 90 tablet 4    vitamin D (RA VITAMIN D-3) 25 MCG (1000 UT) TABS tablet take 1 tablet by mouth once daily 90 tablet 4    blood glucose monitor strips Pt test 3x daily 100 strip 3    Lancets MISC 1 each by In Vitro route 3 times daily DX: E11.65, IDDM (please dispense covered brand) 100 each 6    Blood Glucose Monitoring Suppl (ONE TOUCH ULTRA MINI) w/Device KIT 1 kit by Does not apply route 3 times daily DX: E11.65, IDDM 1 kit 0    glimepiride (AMARYL) 4 MG tablet bid 180 tablet 1    metoprolol succinate (TOPROL XL) 25 MG extended release tablet       RA Alcohol Swabs 70 % PADS use as directed once daily 100 each 1    blood glucose test strips (ONE TOUCH ULTRA TEST) strip TEST three times a day 100 strip 3    acetaminophen (TYLENOL) 500 MG tablet Take 1 tablet by mouth 4 times daily as needed for Pain 120 tablet 2    hydrocortisone 2.5 % cream apply to affected area twice a day (DO NOT APPLY TO FACE)  0    sildenafil (VIAGRA) 100 MG tablet Take 1 tablet by mouth daily as needed for Erectile Dysfunction 5 tablet 11     No current facility-administered medications for this visit. PMH, Surgical Hx, Family Hx, and Social Hxreviewed and updated. Health Maintenance reviewed. Objective    Vitals:    03/10/22 1421   BP: 112/70   Pulse: 66   Resp: 17   Temp: 96.7 °F (35.9 °C)   TempSrc: Temporal   SpO2: 99%   Weight: 181 lb 6.4 oz (82.3 kg)   Height: 5' 9\" (1.753 m)        Physical Exam  Constitutional:       Appearance: He is well-developed. HENT:      Head: Normocephalic and atraumatic. Eyes:      Conjunctiva/sclera: Conjunctivae normal.   Pulmonary:      Effort: Pulmonary effort is normal.   Neurological:      Mental Status: He is alert and oriented to person, place, and time.          Lab Results   Component Value Date    WBC 6.3 03/11/2022    HGB 10.5 (L) 03/11/2022    HCT 33.1 (L) 03/11/2022     03/11/2022    CHOL 127 05/04/2020    TRIG 90 05/04/2020    HDL 35 (L) 03/11/2022    ALT 14 09/20/2018    AST 13 09/20/2018     03/11/2022    K 4.7 03/11/2022     03/11/2022 CREATININE 1.05 03/11/2022    BUN 10 03/11/2022    CO2 22 03/11/2022    TSH 1.530 05/04/2020    PSA 0.22 02/07/2022    INR 1.0 04/09/2012    GLUF 142 (H) 03/11/2022    LABA1C 7.7 (H) 03/11/2022    LABMICR 1.50 02/05/2020       Lab Results   Component Value Date    LABA1C 7.7 (H) 03/11/2022    LABA1C 7.1 03/10/2022    LABA1C 7.9 (H) 08/17/2021     Lab Results   Component Value Date    LABMICR 1.50 02/05/2020    CREATININE 1.05 03/11/2022     Lab Results   Component Value Date    ALT 14 09/20/2018    AST 13 09/20/2018     Lab Results   Component Value Date    CHOL 127 05/04/2020    TRIG 90 05/04/2020    HDL 35 (L) 03/11/2022    [address] 90 03/11/2022        Assessment & Plan   Visit Diagnoses and Associated Orders     Uncontrolled type 2 diabetes mellitus with microalbuminuria ([facility]    -  Primary    Improving, fair control. Continue current meds. Continue current diet and exercise    cloNIDine (CATAPRES) 0.1 MG tablet [[id number]]           Hyperlipidemia with target LDL less than 100        Follow labs    atorvastatin (LIPITOR) 20 MG tablet [[id number]]           Essential hypertension, benign        Stable and well-controlled on current meds. amLODIPine-benazepril (LOTREL) 5-20 MG per capsule [[id number]]           Anemia due to vitamin B12 deficiency, unspecified B12 deficiency type        Follow labs    Cyanocobalamin ER (RA VITAMIN B-12 TR) 1000 MCG TBCR [[id number]]           Stage 3a chronic kidney disease (HCC)        Stable and well-controlled on current meds         Normocytic anemia        Stable. History of B12 deficiency which is now controlled. Hypercalcemia        Normalized. Frequent PVCs        Well-controlled.          Medication refill        atorvastatin (LIPITOR) 20 MG tablet [[id number]]      aspirin (RA ASPIRIN EC) 81 MG EC tablet [[id number]]      amLODIPine-benazepril (LOTREL) 5-20 MG per capsule [[id number]]      vitamin D (RA VITAMIN D-3) 25 MCG (1000 UT) TABS tablet [[id number]]                   Reviewed with the patient: all disease processes, current clinical status, medications, activities and diet.      Side effects, adverse effects of the medication prescribed today, as well as treatment plan/ rationale and result expectations have been discussed with the patient who expresses understanding and desires to proceed.     Close follow up to evaluate treatment results and for coordination of care. I have reviewed the patient's medical history in detail and updated the computerized patient record. More than 50% of the appointment was spent in face-to-face counseling, education and care coordination. Please note this report has been partially produced using speech recognition software and may contain mistakes related to that system including errors in grammar, punctuation and spelling as well as words and phrases that may seem inappropriate. If there are questions or concerns, please feel free to contact me to clarify. No orders of the defined types were placed in this encounter.     Orders Placed This Encounter   Medications    cloNIDine (CATAPRES) 0.1 MG tablet     Sig: take 1 tablet by mouth twice a day     Dispense:  180 tablet     Refill:  4    atorvastatin (LIPITOR) 20 MG tablet     Sig: take 1 tablet by mouth once daily     Dispense:  90 tablet     Refill:  4    aspirin (RA ASPIRIN EC) 81 MG EC tablet     Sig: take 1 tablet by mouth once daily     Dispense:  90 tablet     Refill:  4    amLODIPine-benazepril (LOTREL) 5-20 MG per capsule     Sig: take 1 capsule by mouth once daily     Dispense:  90 capsule     Refill:  4    Cyanocobalamin ER (RA VITAMIN B-12 TR) 1000 MCG TBCR     Sig: take 1 tablet by mouth once daily     Dispense:  90 tablet     Refill:  4    vitamin D (RA VITAMIN D-3) 25 MCG (1000 UT) TABS tablet     Sig: take 1 tablet by mouth once daily     Dispense:  90 tablet     Refill:  4     Medications Discontinued During This Encounter   Medication Reason    cloNIDine (CATAPRES) 0.1 MG tablet REORDER    atorvastatin (LIPITOR) 20 MG tablet REORDER    amLODIPine-benazepril (LOTREL) 5-20 MG per capsule REORDER    vitamin D (RA VITAMIN D-3) 25 MCG (1000 UT) TABS tablet REORDER    Cyanocobalamin ER (RA VITAMIN B-12 TR) 1000 MCG TBCR REORDER    aspirin (RA ASPIRIN EC) 81 MG EC tablet REORDER     Return in about 6 months (around 9/10/2022) for DM, HTN, HLD - OV. Controlled Substance Monitoring:    Acute and Chronic Pain Monitoring:   No flowsheet data found.         Tri Daly MD

## 2022-03-10 NOTE — PROGRESS NOTES
3/10/2022    Assessment:       Diagnosis Orders   1. 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 Medication refill           PLAN:     Continue current dose of Metformin and Januvia   Continue glimepiride 4 mg twice daily  patient to follow-up in 5 months time A1c goal of 7.5 or lower  Orders Placed This Encounter   Medications    blood glucose test strips (ASCENSIA AUTODISC VI;ONE TOUCH ULTRA TEST VI) strip     Si each by In Vitro route daily As needed. Dispense:  100 each     Refill:  3    Lancets (ONETOUCH DELICA PLUS [id number]) MISC     Sig: use 1 LANCET to TEST BLOOD SUGAR three times a day     Dispense:  100 each     Refill:  6    metFORMIN (GLUCOPHAGE) 1000 MG tablet     Sig: take 1 tablet by mouth twice a day with meals     Dispense:  180 tablet     Refill:  3    SITagliptin (JANUVIA) 100 MG tablet     Sig: take 1 tablet by mouth once daily     Dispense:  90 tablet     Refill:  3         Orders Placed This Encounter   Procedures    POCT Glucose    POCT glycosylated hemoglobin (Hb A1C)       Subjective:     Chief Complaint   Patient presents with    Diabetes     Vitals:    03/10/22 1352   BP: 119/78   Pulse: 67   SpO2: 98%   Weight: 181 lb (82.1 kg)     Wt Readings from Last 3 Encounters:   03/10/22 181 lb (82.1 kg)   22 167 lb 3.2 oz (75.8 kg)   21 180 lb (81.6 kg)     BP Readings from Last 3 Encounters:   03/10/22 119/78   22 110/66   21 130/70     6 months follow-up on type 2 diabetes A1c has been stable patient testing blood sugar once or twice a day requesting refills for lancets  Hemoglobin A1c was 7.1 down from 7.9 before    Diabetes  He presents for his follow-up diabetic visit. He has type 2 diabetes mellitus. Symptoms are improving. Current diabetic treatment includes oral agent (triple therapy) (Metformin glimepiride plus Januvia).  His overall blood glucose range is 140-180 mg/dl. (Lab Results       Component                Value               Date                       LABA1C                   7.1                 03/10/2022              ) An ACE inhibitor/angiotensin II receptor blocker is being taken. Past Medical History:   Diagnosis Date    Abnormal EKG 2021    Adenocarcinoma of prostate, stage 2 ([facility] 2018    Jim 3+4, Dr Golden Arthur  PSA 7.69 intermediate risk    Erectile dysfunction associated with type 2 diabetes mellitus ([facility]     Essential hypertension, benign     Intellectual disability     Irregular heart rhythm 2021    Low HDL (under 40)     Obesity (BMI 30-39. 9)     Obesity (BMI 30-39. 9)     Pes planus of both feet     Dr Ania Amaro S/P colonoscopy with polypectomy ,     Dr Charlene Hinds 3a chronic kidney disease ([place] [address] 2021    Uncontrolled type 2 diabetes mellitus with microalbuminuria ([place] [address]     Dr Adrien Cheng    Unspecified deformity of ankle and foot, acquired     Vitamin B12 deficiency anemia 2016     Past Surgical History:   Procedure Laterality Date    COLONOSCOPY  2014    Polyp, Diverticulosis, Internal Hemorrhoids-Dr Sanders    COLONOSCOPY N/A 2019    COLORECTAL CANCER SCREENING, NOT HIGH RISK performed by Merlene Cohen MD at [place] [address].      Full upper/lower extractions    PROSTATE BIOPSY  2015    Dr Golden Arthur     Social History     Socioeconomic History    Marital status: Single     Spouse name: Not on file    Number of children: 2    Years of education: Not on file    Highest education level: Not on file   Occupational History    Occupation: [email or web address], others, SSI   Tobacco Use    Smoking status: Former Smoker     Packs/day: 0.50     Years: 53.00     Pack years: 26.50     Types: Cigarettes     Start date:      Quit date:      Years since quittin.2    Smokeless tobacco: Never Used   Vaping Use    Vaping Use: Never used   Substance and Sexual Activity    Alcohol use: Yes     Comment: occasionally, beer- recovering alcoholic    Drug use: No    Sexual activity: Yes     Partners: Female   Other Topics Concern    Not on file   Social History Narrative    Born in [place], one of 11    Lives in a house in [place] with girlfriend    2 sons in [place], keeps in touch with one of them    Grands: many    Worked \"everywhere\", on disability         Hobbies: video games     Social Determinants of Health     Financial Resource Strain: Low Risk     Difficulty of Paying Living Expenses: Not hard at all   Food Insecurity: No Food Insecurity    Worried About [address] [employer] in the Last Year: Never true    [address]  [employer] in the Last Year: Never true   Transportation Needs: No Transportation Needs    Lack of Transportation (Medical): No    Lack of Transportation (Non-Medical):  No   Physical Activity:     Days of Exercise per Week: Not on file    Minutes of Exercise per Session: Not on file   Stress:     Feeling of Stress : Not on file   Social Connections:     Frequency of Communication with Friends and Family: Not on file    Frequency of Social Gatherings with Friends and Family: Not on file    Attends Confucianist Services: Not on file    Active Member of [address] [place] [address] [employer] or Organizations: Not on file    Attends Club or Organization Meetings: Not on file    Marital Status: Not on file   Intimate Partner Violence:     Fear of Current or Ex-Partner: Not on file    Emotionally Abused: Not on file    Physically Abused: Not on file    Sexually Abused: Not on file   Housing Stability:     Unable to Pay for Housing in the Last Year: Not on file    Number of [place] in the Last Year: Not on file    Unstable Housing in the Last Year: Not on file     Family History   Problem Relation Age of Onset    Stroke Mother         dec age [de-identified]    Cancer Brother         liver, dec age 79    Diabetes Father     Cancer Father         prostate     No Known Allergies    Current Outpatient Medications:     aspirin (RA ASPIRIN EC) 81 MG EC tablet, take 1 tablet by mouth once daily, Disp: 90 tablet, Rfl: 4    SITagliptin (JANUVIA) 100 MG tablet, take 1 tablet by mouth once daily, Disp: 90 tablet, Rfl: 3    blood glucose monitor strips, Pt test 3x daily, Disp: 100 strip, Rfl: 3    Lancets MISC, 1 each by In Vitro route 3 times daily DX: E11.65, IDDM (please dispense covered brand), Disp: 100 each, Rfl: 6    Blood Glucose Monitoring Suppl (ONE TOUCH ULTRA MINI) w/Device KIT, 1 kit by Does not apply route 3 times daily DX: E11.65, IDDM, Disp: 1 kit, Rfl: 0    glimepiride (AMARYL) 4 MG tablet, bid, Disp: 180 tablet, Rfl: 1    metoprolol succinate (TOPROL XL) 25 MG extended release tablet, , Disp: , Rfl:     metFORMIN (GLUCOPHAGE) 1000 MG tablet, take 1 tablet by mouth twice a day with meals, Disp: 180 tablet, Rfl: 3    blood glucose test strips (ASCENSIA AUTODISC VI;ONE TOUCH ULTRA TEST VI) strip, 1 each by In Vitro route daily As needed. , Disp: 100 each, Rfl: 3    cloNIDine (CATAPRES) 0.1 MG tablet, take 1 tablet by mouth twice a day, Disp: 180 tablet, Rfl: 4    atorvastatin (LIPITOR) 20 MG tablet, take 1 tablet by mouth once daily, Disp: 90 tablet, Rfl: 4    amLODIPine-benazepril (LOTREL) 5-20 MG per capsule, take 1 capsule by mouth once daily, Disp: 90 capsule, Rfl: 4    vitamin D (RA VITAMIN D-3) 25 MCG (1000 UT) TABS tablet, take 1 tablet by mouth once daily, Disp: 90 tablet, Rfl: 4    Cyanocobalamin ER (RA VITAMIN B-12 TR) 1000 MCG TBCR, take 1 tablet by mouth once daily, Disp: 90 tablet, Rfl: 4    Lancets (ONETOUCH DELICA PLUS [id number]) MISC, use 1 LANCET to TEST BLOOD SUGAR three times a day, Disp: 100 each, Rfl: 6    RA Alcohol Swabs 70 % PADS, use as directed once daily, Disp: 100 each, Rfl: 1    blood glucose test strips (ONE TOUCH ULTRA TEST) strip, TEST three times a day, Disp: 100 strip, Rfl: 3    acetaminophen (TYLENOL) 500 MG tablet, Take 1 tablet by mouth 4 times daily as needed for Pain, Disp: 120 tablet, Rfl: 2    hydrocortisone 2.5 % cream, apply to affected area twice a day (DO NOT APPLY TO FACE), Disp: , Rfl: 0    sildenafil (VIAGRA) 100 MG tablet, Take 1 tablet by mouth daily as needed for Erectile Dysfunction, Disp: 5 tablet, Rfl: 11  Lab Results   Component Value Date     08/17/2021    K 4.7 08/17/2021     08/17/2021    CO2 23 08/17/2021    BUN 11 08/17/2021    CREATININE 1.20 08/17/2021    GLUCOSE 243 03/10/2022    CALCIUM 9.5 08/17/2021    PROT 7.6 09/20/2018    LABALBU 4.5 09/20/2018    BILITOT 0.5 09/20/2018    ALKPHOS 81 09/20/2018    AST 13 09/20/2018    ALT 14 09/20/2018    LABGLOM 59.0 (L) 08/17/2021    GFRAA >60.0 08/17/2021    GLOB 3.1 09/20/2018     Lab Results   Component Value Date    WBC 6.6 08/17/2021    HGB 12.1 (L) 08/17/2021    HCT 37.9 (L) 08/17/2021    MCV 83.0 08/17/2021     08/17/2021     Lab Results   Component Value Date    LABA1C 7.1 03/10/2022    LABA1C 7.9 (H) 08/17/2021    LABA1C 8.0 (H) 01/28/2021     Lab Results   Component Value Date    HDL 33 (L) 05/04/2020    HDL 29 (L) 02/05/2019    HDL 29 (L) 10/05/2017    LDLCALC 76 05/04/2020    LDLCALC 92 02/05/2019    LDLCALC 104 10/05/2017    CHOL 127 05/04/2020    CHOL 140 02/05/2019    CHOL 151 10/05/2017    TRIG 90 05/04/2020    TRIG 93 02/05/2019    TRIG 89 10/05/2017     No results found for: TESTM  Lab Results   Component Value Date    TSH 1.530 05/04/2020    TSH 2.070 02/05/2016    TSH 1.840 02/17/2014     No results found for: TPOABS    Review of Systems   Cardiovascular: Negative. Endocrine: Negative. All other systems reviewed and are negative. Objective:   Physical Exam  Vitals reviewed. Constitutional:       Appearance: Normal appearance. HENT:      Head: Normocephalic and atraumatic. Right Ear: External ear normal.      Left Ear: External ear normal.      Nose: Nose normal.   Eyes:      General: No scleral icterus.         Right eye: No discharge. Left eye: No discharge. Extraocular Movements: Extraocular movements intact. Conjunctiva/sclera: Conjunctivae normal.   Cardiovascular:      Rate and Rhythm: Normal rate. Pulmonary:      Effort: Pulmonary effort is normal.   Musculoskeletal:         General: Normal range of motion. Cervical back: Normal range of motion and neck supple. Neurological:      General: No focal deficit present. Mental Status: He is alert and oriented to person, place, and time.    Psychiatric:         Mood and Affect: Mood normal.         Behavior: Behavior normal.

## 2022-03-11 DIAGNOSIS — D51.9 ANEMIA DUE TO VITAMIN B12 DEFICIENCY, UNSPECIFIED B12 DEFICIENCY TYPE: ICD-10-CM

## 2022-03-11 DIAGNOSIS — E78.5 HYPERLIPIDEMIA WITH TARGET LDL LESS THAN 100: ICD-10-CM

## 2022-03-11 LAB
ANION GAP SERPL CALCULATED.3IONS-SCNC: 17 MEQ/L (ref 9–15)
BASOPHILS ABSOLUTE: 0 K/UL (ref 0–0.2)
BASOPHILS RELATIVE PERCENT: 0.5 %
BUN BLDV-MCNC: 10 MG/DL (ref 8–23)
CALCIUM SERPL-MCNC: 9.3 MG/DL (ref 8.5–9.9)
CHLORIDE BLD-SCNC: 103 MEQ/L (ref 95–107)
CHOLESTEROL, FASTING: 143 MG/DL (ref 0–199)
CO2: 22 MEQ/L (ref 20–31)
CREAT SERPL-MCNC: 1.05 MG/DL (ref 0.7–1.2)
EOSINOPHILS ABSOLUTE: 0.3 K/UL (ref 0–0.7)
EOSINOPHILS RELATIVE PERCENT: 4.4 %
GFR AFRICAN AMERICAN: >60
GFR NON-AFRICAN AMERICAN: >60
GLUCOSE FASTING: 142 MG/DL (ref 70–99)
HBA1C MFR BLD: 7.7 % (ref 4.8–5.9)
HCT VFR BLD CALC: 33.1 % (ref 42–52)
HDLC SERPL-MCNC: 35 MG/DL (ref 40–59)
HEMOGLOBIN: 10.5 G/DL (ref 14–18)
LDL CHOLESTEROL CALCULATED: 90 MG/DL (ref 0–129)
LYMPHOCYTES ABSOLUTE: 1.1 K/UL (ref 1–4.8)
LYMPHOCYTES RELATIVE PERCENT: 18.1 %
MCH RBC QN AUTO: 26.2 PG (ref 27–31.3)
MCHC RBC AUTO-ENTMCNC: 31.7 % (ref 33–37)
MCV RBC AUTO: 82.8 FL (ref 80–100)
MONOCYTES ABSOLUTE: 0.7 K/UL (ref 0.2–0.8)
MONOCYTES RELATIVE PERCENT: 11.4 %
NEUTROPHILS ABSOLUTE: 4.2 K/UL (ref 1.4–6.5)
NEUTROPHILS RELATIVE PERCENT: 65.6 %
PDW BLD-RTO: 18.8 % (ref 11.5–14.5)
PLATELET # BLD: 269 K/UL (ref 130–400)
POTASSIUM SERPL-SCNC: 4.7 MEQ/L (ref 3.4–4.9)
RBC # BLD: 4 M/UL (ref 4.7–6.1)
SODIUM BLD-SCNC: 142 MEQ/L (ref 135–144)
TRIGLYCERIDE, FASTING: 92 MG/DL (ref 0–150)
VITAMIN B-12: 534 PG/ML (ref 232–1245)
WBC # BLD: 6.3 K/UL (ref 4.8–10.8)

## 2022-03-19 DIAGNOSIS — D64.9 NORMOCYTIC ANEMIA: Primary | ICD-10-CM

## 2022-03-19 NOTE — RESULT ENCOUNTER NOTE
Please call patient. Anemia has gotten somewhat worse even though B12 levels are normal.  I am concerned that he may be bleeding from more colon polyps. I have referred him to Dr. Quiana Rivera to discuss this with him.

## 2022-03-21 RX ORDER — GLIMEPIRIDE 4 MG/1
TABLET ORAL
Qty: 180 TABLET | Refills: 1 | Status: SHIPPED | OUTPATIENT
Start: 2022-03-21 | End: 2022-08-30 | Stop reason: SDUPTHER

## 2022-04-04 ENCOUNTER — OFFICE VISIT (OUTPATIENT)
Dept: GASTROENTEROLOGY | Age: 77
End: 2022-04-04
Payer: COMMERCIAL

## 2022-04-04 VITALS
DIASTOLIC BLOOD PRESSURE: 72 MMHG | HEIGHT: 69 IN | HEART RATE: 66 BPM | SYSTOLIC BLOOD PRESSURE: 138 MMHG | WEIGHT: 180.2 LBS | OXYGEN SATURATION: 99 % | BODY MASS INDEX: 26.69 KG/M2

## 2022-04-04 DIAGNOSIS — D50.9 IRON DEFICIENCY ANEMIA, UNSPECIFIED IRON DEFICIENCY ANEMIA TYPE: Primary | ICD-10-CM

## 2022-04-04 PROCEDURE — 1036F TOBACCO NON-USER: CPT | Performed by: SPECIALIST

## 2022-04-04 PROCEDURE — G8417 CALC BMI ABV UP PARAM F/U: HCPCS | Performed by: SPECIALIST

## 2022-04-04 PROCEDURE — 4040F PNEUMOC VAC/ADMIN/RCVD: CPT | Performed by: SPECIALIST

## 2022-04-04 PROCEDURE — G8427 DOCREV CUR MEDS BY ELIG CLIN: HCPCS | Performed by: SPECIALIST

## 2022-04-04 PROCEDURE — 99204 OFFICE O/P NEW MOD 45 MIN: CPT | Performed by: SPECIALIST

## 2022-04-04 PROCEDURE — 1123F ACP DISCUSS/DSCN MKR DOCD: CPT | Performed by: SPECIALIST

## 2022-04-04 PROCEDURE — 3017F COLORECTAL CA SCREEN DOC REV: CPT | Performed by: SPECIALIST

## 2022-04-04 RX ORDER — AMMONIUM LACTATE 12 G/100G
LOTION TOPICAL
COMMUNITY

## 2022-04-04 ASSESSMENT — ENCOUNTER SYMPTOMS
VOMITING: 0
GASTROINTESTINAL NEGATIVE: 1
NAUSEA: 0
ABDOMINAL PAIN: 0
BLOOD IN STOOL: 0
RESPIRATORY NEGATIVE: 1
DIARRHEA: 0
ANAL BLEEDING: 0
ABDOMINAL DISTENTION: 0
EYES NEGATIVE: 1
CONSTIPATION: 0
RECTAL PAIN: 0

## 2022-04-04 NOTE — PROGRESS NOTES
Gastroenterology Clinic Visit    Chela Fong  [id number]  Chief Complaint   Patient presents with    New Patient     Colonoscopy 2019    Anemia     denies melena or hematochezia; H/H - 10.5/33.1       HPI: 77 y.o. male presents to the clinic with history of normocytic anemia, hemoglobin hematocrit on March 11 was 10.5 and 33.1 and in August 2021 it was 12.1 and 37.9. No history of any rectal bleeding, no change in bowel habits, patient had colonoscopy by me in 2019 which showed diverticulosis and benign colon polyp, tubular adenoma, patient has history of prostate cancer and was treated with radiation. Reports no abdominal pain nausea or emesis, no GERD symptoms. Patient is not on any NSAIDs he has a history of diabetes, takes low-dose aspirin, social history does not smoke drinks alcohol occasionally  No known family history of colorectal cancer  Review of Systems   Constitutional: Negative. HENT: Negative. Eyes: Negative. Respiratory: Negative. Cardiovascular:        Cardiac status stable   Gastrointestinal: Negative. Negative for abdominal distention, abdominal pain, anal bleeding, blood in stool, constipation, diarrhea, nausea, rectal pain and vomiting. No GI issues   Endocrine:        Diabetes under control   Genitourinary: Negative. History of prostate cancer treated with radiation   Musculoskeletal: Negative. Neurological: Negative. Psychiatric/Behavioral: Negative. Past Medical History:   Diagnosis Date    Abnormal EKG 2/25/2021    Adenocarcinoma of prostate, stage 2 ([facility] 2018    Jim 3+4, Dr Guzmán Forward  PSA 7.69 intermediate risk    Erectile dysfunction associated with type 2 diabetes mellitus ([facility]     Essential hypertension, benign     Intellectual disability     Irregular heart rhythm 2/25/2021    Low HDL (under 40)     Obesity (BMI 30-39. 9)     Obesity (BMI 30-39. 9)     Pes planus of both feet 2015    Dr Najma Dawson S/P colonoscopy with polypectomy 2014, 2019    Dr Millicent Bishop 3a chronic kidney disease ([facility] 2/25/2021    Uncontrolled type 2 diabetes mellitus with microalbuminuria [facility]) 2007    Dr Jakie Kawasaki    Unspecified deformity of ankle and foot, acquired     Vitamin B12 deficiency anemia 2016      Past Surgical History:   Procedure Laterality Date    COLONOSCOPY  03/20/2014    Polyp, Diverticulosis, Internal Hemorrhoids-Dr Sanders    COLONOSCOPY N/A 4/11/2019    COLORECTAL CANCER SCREENING, NOT HIGH RISK performed by Trudy Sams MD at [facility]      Full upper/lower extractions    PROSTATE BIOPSY  1/2015    Dr Isbell Magoffin     Current Outpatient Medications on File Prior to Visit   Medication Sig Dispense Refill    ammonium lactate (LAC-HYDRIN) 12 % lotion Ammonium Lactate 12 % External Lotion APPLY AND RUB IN A THIN FILM TO AFFECTED AREAS TWICE DAILY. (AM AND PM). Quantity: 0 Refills: 0 Ordered: 22-Oct-2021 DO Active      glimepiride (AMARYL) 4 MG tablet bid 180 tablet 1    blood glucose test strips (ASCENSIA AUTODISC VI;ONE TOUCH ULTRA TEST VI) strip 1 each by In Vitro route daily As needed.  100 each 3    Lancets (ONETOUCH DELICA PLUS [id number]) MISC use 1 LANCET to TEST BLOOD SUGAR three times a day 100 each 6    metFORMIN (GLUCOPHAGE) 1000 MG tablet take 1 tablet by mouth twice a day with meals 180 tablet 3    SITagliptin (JANUVIA) 100 MG tablet take 1 tablet by mouth once daily 90 tablet 3    cloNIDine (CATAPRES) 0.1 MG tablet take 1 tablet by mouth twice a day 180 tablet 4    atorvastatin (LIPITOR) 20 MG tablet take 1 tablet by mouth once daily 90 tablet 4    aspirin (RA ASPIRIN EC) 81 MG EC tablet take 1 tablet by mouth once daily 90 tablet 4    amLODIPine-benazepril (LOTREL) 5-20 MG per capsule take 1 capsule by mouth once daily 90 capsule 4    Cyanocobalamin ER (RA VITAMIN B-12 TR) 1000 MCG TBCR take 1 tablet by mouth once daily 90 tablet 4    vitamin D (RA VITAMIN D-3) 25 MCG (1000 UT) TABS tablet take 1 tablet by mouth once daily 90 tablet 4    blood glucose monitor strips Pt test 3x daily 100 strip 3    Lancets MISC 1 each by In Vitro route 3 times daily DX: E11.65, IDDM (please dispense covered brand) 100 each 6    Blood Glucose Monitoring Suppl (ONE TOUCH ULTRA MINI) w/Device KIT 1 kit by Does not apply route 3 times daily DX: E11.65, IDDM 1 kit 0    metoprolol succinate (TOPROL XL) 25 MG extended release tablet       RA Alcohol Swabs 70 % PADS use as directed once daily 100 each 1    blood glucose test strips (ONE TOUCH ULTRA TEST) strip TEST three times a day 100 strip 3    acetaminophen (TYLENOL) 500 MG tablet Take 1 tablet by mouth 4 times daily as needed for Pain 120 tablet 2    hydrocortisone 2.5 % cream apply to affected area twice a day (DO NOT APPLY TO FACE)  0    sildenafil (VIAGRA) 100 MG tablet Take 1 tablet by mouth daily as needed for Erectile Dysfunction 5 tablet 11     No current facility-administered medications on file prior to visit.      Family History   Problem Relation Age of Onset   Roman Stroke Mother         dec age [de-identified] Cancer Brother         liver, dec age 79    Diabetes Father     Cancer Father         prostate      Social History     Socioeconomic History    Marital status: Single     Spouse name: None    Number of children: 2    Years of education: None    Highest education level: None   Occupational History    Occupation: [employer], others, SSI   Tobacco Use    Smoking status: Former Smoker     Packs/day: 0.50     Years: 53.00     Pack years: 26.50     Types: Cigarettes     Start date:      Quit date:      Years since quittin.2    Smokeless tobacco: Never Used   Vaping Use    Vaping Use: Never used   Substance and Sexual Activity    Alcohol use: Yes     Comment: occasionally, beer- recovering alcoholic    Drug use: No    Sexual activity: Yes     Partners: Female   Other Topics Concern    None   Social History Narrative    Born in [place], one of 11    Lives in a house in [place] with girlfriend    2 sons in [place], keeps in touch with one of them    Grands: many    Worked \"everywhere\", on disability         Hobbies: video games     Social Determinants of Health     Financial Resource Strain: Low Risk     Difficulty of Paying Living Expenses: Not hard at all   Food Insecurity: No Food Insecurity    Worried About [address] [place] [address] in the Last Year: Never true    [employer] in the Last Year: Never true   Transportation Needs: No Transportation Needs    Lack of Transportation (Medical): No    Lack of Transportation (Non-Medical): No   Physical Activity:     Days of Exercise per Week: Not on file    Minutes of Exercise per Session: Not on file   Stress:     Feeling of Stress : Not on file   Social Connections:     Frequency of Communication with Friends and Family: Not on file    Frequency of Social Gatherings with Friends and Family: Not on file    Attends Episcopal Services: Not on file    Active Member of [address] [place] [address] or Organizations: Not on file    Attends Club or Organization Meetings: Not on file    Marital Status: Not on file   Intimate Partner Violence:     Fear of Current or Ex-Partner: Not on file    Emotionally Abused: Not on file    Physically Abused: Not on file    Sexually Abused: Not on file   Housing Stability:     Unable to Pay for Housing in the Last Year: Not on file    Number of [place] in the Last Year: Not on file    Unstable Housing in the Last Year: Not on file       Blood pressure 138/72, pulse 66, height 5' 9\" (1.753 m), weight 180 lb 3.2 oz (81.7 kg), SpO2 99 %. Physical Exam  Constitutional:       Appearance: He is well-developed. HENT:      Head: Normocephalic. Eyes:      Pupils: Pupils are equal, round, and reactive to light. Comments: Mild conjunctival pallor   Cardiovascular:      Rate and Rhythm: Normal rate and regular rhythm. Heart sounds: Normal heart sounds. Pulmonary:      Effort: Pulmonary effort is normal.      Breath sounds: Normal breath sounds. Abdominal:      General: Bowel sounds are normal.      Palpations: Abdomen is soft. Comments:  ventral hernia, no palpable mass   Skin:     General: Skin is warm and dry. Neurological:      Mental Status: He is alert. Laboratory, Pathology, Radiology reviewed indetail with relevant important investigations summarized below:  Lab Results   Component Value Date    WBC 6.3 03/11/2022    HGB 10.5 (L) 03/11/2022    HCT 33.1 (L) 03/11/2022    MCV 82.8 03/11/2022     03/11/2022     Lab Results   Component Value Date    ALT 14 09/20/2018    AST 13 09/20/2018    ALKPHOS 81 09/20/2018    BILITOT 0.5 09/20/2018       No results found. Endoscopic investigations:     Assessmentand Plan:  77 y.o. male with history of normocytic anemia, normal B12 level, colonoscopy in 2019 which showed diverticulosis and tubular adenoma, patient has history of diabetes. Needs rule out any upper GI source. Rule out malabsorption will schedule EGD. Diagnosis Orders   1. Iron deficiency anemia, unspecified iron deficiency anemia type  EGD     Return in about 2 months (around 6/4/2022). Vangie Moscoso MD   Staff Gastroenterologist  [facility]    Please note this report has been partially produced using speech recognition software and may cause contain errors related to thatsystem including grammar, punctuation and spelling as well as words and phrases that may seem inappropriate. If there are questions or concerns please feel free to contact me to clarify.

## 2022-04-21 ENCOUNTER — ANESTHESIA (OUTPATIENT)
Dept: ENDOSCOPY | Age: 77
End: 2022-04-21
Payer: COMMERCIAL

## 2022-04-21 ENCOUNTER — ANCILLARY PROCEDURE (OUTPATIENT)
Dept: ENDOSCOPY | Age: 77
End: 2022-04-21
Attending: SPECIALIST
Payer: COMMERCIAL

## 2022-04-21 ENCOUNTER — HOSPITAL ENCOUNTER (OUTPATIENT)
Age: 77
Setting detail: OUTPATIENT SURGERY
Discharge: HOME | End: 2022-04-21
Attending: SPECIALIST | Admitting: SPECIALIST
Payer: COMMERCIAL

## 2022-04-21 ENCOUNTER — ANESTHESIA EVENT (OUTPATIENT)
Dept: ENDOSCOPY | Age: 77
End: 2022-04-21
Payer: COMMERCIAL

## 2022-04-21 VITALS
HEIGHT: 69 IN | TEMPERATURE: 97.2 F | SYSTOLIC BLOOD PRESSURE: 102 MMHG | RESPIRATION RATE: 16 BRPM | BODY MASS INDEX: 26.66 KG/M2 | HEART RATE: 66 BPM | WEIGHT: 180 LBS | OXYGEN SATURATION: 97 % | DIASTOLIC BLOOD PRESSURE: 55 MMHG

## 2022-04-21 VITALS — SYSTOLIC BLOOD PRESSURE: 95 MMHG | OXYGEN SATURATION: 100 % | DIASTOLIC BLOOD PRESSURE: 54 MMHG

## 2022-04-21 LAB
CHP ED QC CHECK: NORMAL
GLUCOSE BLD-MCNC: 122 MG/DL
GLUCOSE BLD-MCNC: 122 MG/DL (ref 70–99)
PERFORMED ON: ABNORMAL

## 2022-04-21 PROCEDURE — 2580000003 HC RX 258

## 2022-04-21 PROCEDURE — 3700000000 HC ANESTHESIA ATTENDED CARE: Performed by: SPECIALIST

## 2022-04-21 PROCEDURE — 6370000000 HC RX 637 (ALT 250 FOR IP): Performed by: SPECIALIST

## 2022-04-21 PROCEDURE — 6360000002 HC RX W HCPCS: Performed by: REGISTERED NURSE

## 2022-04-21 PROCEDURE — 2500000003 HC RX 250 WO HCPCS: Performed by: REGISTERED NURSE

## 2022-04-21 PROCEDURE — 7100000010 HC PHASE II RECOVERY - FIRST 15 MIN: Performed by: SPECIALIST

## 2022-04-21 PROCEDURE — 2580000003 HC RX 258: Performed by: SPECIALIST

## 2022-04-21 PROCEDURE — 43239 EGD BIOPSY SINGLE/MULTIPLE: CPT | Performed by: SPECIALIST

## 2022-04-21 PROCEDURE — 3609017100 HC EGD: Performed by: SPECIALIST

## 2022-04-21 PROCEDURE — 2709999900 HC NON-CHARGEABLE SUPPLY: Performed by: SPECIALIST

## 2022-04-21 PROCEDURE — 7100000011 HC PHASE II RECOVERY - ADDTL 15 MIN: Performed by: SPECIALIST

## 2022-04-21 PROCEDURE — 88305 TISSUE EXAM BY PATHOLOGIST: CPT

## 2022-04-21 PROCEDURE — 88342 IMHCHEM/IMCYTCHM 1ST ANTB: CPT

## 2022-04-21 RX ORDER — SODIUM CHLORIDE 9 MG/ML
INJECTION, SOLUTION INTRAVENOUS CONTINUOUS
Status: DISCONTINUED | OUTPATIENT
Start: 2022-04-21 | End: 2022-04-21 | Stop reason: HOSPADM

## 2022-04-21 RX ORDER — GLYCOPYRROLATE 1 MG/5 ML
SYRINGE (ML) INTRAVENOUS PRN
Status: DISCONTINUED | OUTPATIENT
Start: 2022-04-21 | End: 2022-04-21 | Stop reason: SDUPTHER

## 2022-04-21 RX ORDER — SODIUM CHLORIDE 9 MG/ML
INJECTION, SOLUTION INTRAVENOUS
Status: COMPLETED
Start: 2022-04-21 | End: 2022-04-21

## 2022-04-21 RX ORDER — LIDOCAINE HYDROCHLORIDE 20 MG/ML
INJECTION, SOLUTION EPIDURAL; INFILTRATION; INTRACAUDAL; PERINEURAL PRN
Status: DISCONTINUED | OUTPATIENT
Start: 2022-04-21 | End: 2022-04-21 | Stop reason: SDUPTHER

## 2022-04-21 RX ORDER — SIMETHICONE 20 MG/.3ML
EMULSION ORAL PRN
Status: DISCONTINUED | OUTPATIENT
Start: 2022-04-21 | End: 2022-04-21 | Stop reason: ALTCHOICE

## 2022-04-21 RX ORDER — MAGNESIUM HYDROXIDE 1200 MG/15ML
LIQUID ORAL PRN
Status: DISCONTINUED | OUTPATIENT
Start: 2022-04-21 | End: 2022-04-21 | Stop reason: ALTCHOICE

## 2022-04-21 RX ORDER — PROPOFOL 10 MG/ML
INJECTION, EMULSION INTRAVENOUS PRN
Status: DISCONTINUED | OUTPATIENT
Start: 2022-04-21 | End: 2022-04-21 | Stop reason: SDUPTHER

## 2022-04-21 RX ADMIN — PROPOFOL 200 MG: 10 INJECTION, EMULSION INTRAVENOUS at 12:52

## 2022-04-21 RX ADMIN — SODIUM CHLORIDE: 9 INJECTION, SOLUTION INTRAVENOUS at 11:53

## 2022-04-21 RX ADMIN — Medication 0.4 MG: at 12:52

## 2022-04-21 RX ADMIN — PROPOFOL 200 MG: 10 INJECTION, EMULSION INTRAVENOUS at 12:57

## 2022-04-21 RX ADMIN — LIDOCAINE HYDROCHLORIDE 60 MG: 20 INJECTION, SOLUTION EPIDURAL; INFILTRATION; INTRACAUDAL; PERINEURAL at 12:52

## 2022-04-21 ASSESSMENT — PAIN - FUNCTIONAL ASSESSMENT: PAIN_FUNCTIONAL_ASSESSMENT: NONE - DENIES PAIN

## 2022-04-21 ASSESSMENT — PULMONARY FUNCTION TESTS
PIF_VALUE: 1

## 2022-04-21 NOTE — ANESTHESIA PRE PROCEDURE
Department of Anesthesiology  Preprocedure Note       Name:  Nancy Austin. Age:  77 y.o.  :  1945                                          MRN:  [id number]         Date:  2022      Surgeon: Carlton Hill):  Ellen Miller MD    Procedure: Procedure(s):  EGD DIAGNOSTIC ONLY    Medications prior to admission:   Prior to Admission medications    Medication Sig Start Date End Date Taking? Authorizing Provider   ammonium lactate (LAC-HYDRIN) 12 % lotion Ammonium Lactate 12 % External Lotion APPLY AND RUB IN A THIN FILM TO AFFECTED AREAS TWICE DAILY. (AM AND PM). Quantity: 0 Refills: 0 Ordered: 22-Oct-2021 DO Active    Historical Provider, MD   glimepiride (AMARYL) 4 MG tablet bid 3/21/22   Lisa Martinez MD   blood glucose test strips (ASCENSIA AUTODISC VI;ONE TOUCH ULTRA TEST VI) strip 1 each by In Vitro route daily As needed.  3/10/22   Lisa Martinez MD   Lancets (ONETOUCH DELICA PLUS [id number]) MISC use 1 LANCET to TEST BLOOD SUGAR three times a day 3/10/22   Lisa Martinez MD   metFORMIN (GLUCOPHAGE) 1000 MG tablet take 1 tablet by mouth twice a day with meals 3/10/22   Lisa Martinez MD   SITagliptin (JANUVIA) 100 MG tablet take 1 tablet by mouth once daily 3/10/22   Lisa Martinez MD   cloNIDine (CATAPRES) 0.1 MG tablet take 1 tablet by mouth twice a day 3/10/22   Kaity Chang MD   atorvastatin (LIPITOR) 20 MG tablet take 1 tablet by mouth once daily 3/10/22   Kaity Chang MD   aspirin (RA ASPIRIN EC) 81 MG EC tablet take 1 tablet by mouth once daily 3/10/22   Kaity Chang MD   amLODIPine-benazepril (LOTREL) 5-20 MG per capsule take 1 capsule by mouth once daily 3/10/22   Kaity Chang MD   Cyanocobalamin ER (RA VITAMIN B-12 TR) 1000 MCG TBCR take 1 tablet by mouth once daily 3/10/22   Kaity Cahng MD   vitamin D (RA VITAMIN D-3) 25 MCG (1000 UT) TABS tablet take 1 tablet by mouth once daily 3/10/22   Kaity Chang MD   blood glucose monitor strips Pt test 3x daily 10/20/21   Marcelle Robertson MD   Lancets MISC 1 each by In Vitro route 3 times daily DX: E11.65, IDDM (please dispense covered brand) 10/20/21   Marcelle Robertson MD   Blood Glucose Monitoring Suppl (ONE TOUCH ULTRA MINI) w/Device KIT 1 kit by Does not apply route 3 times daily DX: E11.65, IDDM 10/19/21   Marcelle Robertson MD   metoprolol succinate (TOPROL XL) 25 MG extended release tablet  8/24/21   Historical Provider, MD   RA Alcohol Swabs 70 % PADS use as directed once daily 8/25/20   Marcelle Robertson MD   blood glucose test strips (ONE TOUCH ULTRA TEST) strip TEST three times a day 6/22/20   Marcelle Robertson MD   acetaminophen (TYLENOL) 500 MG tablet Take 1 tablet by mouth 4 times daily as needed for Pain 3/24/20   Mar Fonseca MD   hydrocortisone 2.5 % cream apply to affected area twice a day (DO NOT APPLY TO FACE)  Patient not taking: Reported on 4/21/2022 8/17/19   Historical Provider, MD   sildenafil (VIAGRA) 100 MG tablet Take 1 tablet by mouth daily as needed for Erectile Dysfunction 2/12/19   Mauricio Anderson MD       Current medications:    Current Facility-Administered Medications   Medication Dose Route Frequency Provider Last Rate Last Admin    sterile water for irrigation    PRN Rosa Lawson MD   500 mL at 04/21/22 1147    simethicone (MYLICON) 40 RG/2.1LW drops    PRN Rosa Lawson MD   40 mg at 04/21/22 1148    0.9 % sodium chloride infusion   IntraVENous Continuous Rosa Lawson  mL/hr at 04/21/22 1153 New Bag at 04/21/22 1153       Allergies:  No Known Allergies    Problem List:    Patient Active Problem List   Diagnosis Code    Essential hypertension, benign I10    Hyperlipidemia with target LDL less than 100 E78.5    Pes planus M21.40    Normocytic anemia D64.9    Erectile dysfunction associated with type 2 diabetes mellitus ([facility] [address] E11.69, N52.1    Knee pain M25.569    Anemia due to vitamin B12 deficiency D51.9    Hypercalcemia E83.52    Uncontrolled type 2 diabetes mellitus with microalbuminuria (HCC) E11.29, E11.65, R80.9    Adenomatous polyp of descending colon D12.4    History of prostate cancer Z85.46    Stage 3a chronic kidney disease (HCC) N18.31    Frequent PVCs I49.3    Abnormal EKG R94.31       Past Medical History:        Diagnosis Date    Abnormal EKG 2021    Adenocarcinoma of prostate, stage 2 ([facility] [place] [facility]     Jim 3+4, Dr Hebert Ellsworth  PSA 7.69 intermediate risk    Erectile dysfunction associated with type 2 diabetes mellitus ([facility] [place] [facility]     Essential hypertension, benign     Hyperlipidemia     Intellectual disability     Irregular heart rhythm 2021    Low HDL (under 40)     Obesity (BMI 30-39. 9)     Obesity (BMI 30-39. 9)     Pes planus of both feet     Dr Padma Gagnon S/P colonoscopy with polypectomy ,     Dr Reuben Solano 3a chronic kidney disease ([facility] [place] [facility] 2021    Uncontrolled type 2 diabetes mellitus with microalbuminuria ([facility] [place] [facility]     Dr Rei Lovett    Unspecified deformity of ankle and foot, acquired     Vitamin B12 deficiency anemia 2016       Past Surgical History:        Procedure Laterality Date    COLONOSCOPY  2014    Polyp, Diverticulosis, Internal Hemorrhoids-Dr Sanders    COLONOSCOPY N/A 2019    COLORECTAL CANCER SCREENING, NOT HIGH RISK performed by Melinda Jernigan MD at [place] [address].      Full upper/lower extractions    PROSTATE BIOPSY  2015    Dr Hebert Ellsworth       Social History:    Social History     Tobacco Use    Smoking status: Former Smoker     Packs/day: 0.50     Years: 53.00     Pack years: 26.50     Types: Cigarettes     Start date:      Quit date:      Years since quittin.3    Smokeless tobacco: Never Used   Substance Use Topics    Alcohol use: Not Currently     Comment: occasionally, beer- recovering alcoholic                                Counseling given: Not Answered      Vital Signs (Current):   Vitals:    22 1148   BP: (!) 199/89 Pulse: 63   Resp: 18   Temp: 36.2 °C (97.2 °F)   TempSrc: Temporal   SpO2: 98%   Weight: 180 lb (81.6 kg)   Height: 5' 9\" (1.753 m)                                              BP Readings from Last 3 Encounters:   04/21/22 (!) 199/89   04/04/22 138/72   03/10/22 112/70       NPO Status: Time of last liquid consumption: 0800                        Time of last solid consumption: 1600                        Date of last liquid consumption: 04/21/22                        Date of last solid food consumption: 04/20/22    BMI:   Wt Readings from Last 3 Encounters:   04/21/22 180 lb (81.6 kg)   04/04/22 180 lb 3.2 oz (81.7 kg)   03/10/22 181 lb 6.4 oz (82.3 kg)     Body mass index is 26.58 kg/m².     CBC:   Lab Results   Component Value Date    WBC 6.3 03/11/2022    RBC 4.00 03/11/2022    RBC 4.22 04/11/2012    HGB 10.5 03/11/2022    HCT 33.1 03/11/2022    MCV 82.8 03/11/2022    RDW 18.8 03/11/2022     03/11/2022       CMP:   Lab Results   Component Value Date     03/11/2022    K 4.7 03/11/2022     03/11/2022    CO2 22 03/11/2022    BUN 10 03/11/2022    CREATININE 1.05 03/11/2022    GFRAA >60.0 03/11/2022    LABGLOM >60.0 03/11/2022    GLUCOSE 122 04/21/2022    GLUCOSE 95 04/11/2012    PROT 7.6 09/20/2018    CALCIUM 9.3 03/11/2022    BILITOT 0.5 09/20/2018    ALKPHOS 81 09/20/2018    AST 13 09/20/2018    ALT 14 09/20/2018       POC Tests:   Recent Labs     04/21/22  1155   POCGLU 122*       Coags:   Lab Results   Component Value Date    PROTIME 10.4 04/09/2012    INR 1.0 04/09/2012       HCG (If Applicable): No results found for: PREGTESTUR, PREGSERUM, HCG, HCGQUANT     ABGs: No results found for: PHART, PO2ART, AEP9QMT, TLX6JRY, BEART, A1VLJVRZ     Type & Screen (If Applicable):  No results found for: LABABO, LABRH    Drug/Infectious Status (If Applicable):  No results found for: HIV, HEPCAB    COVID-19 Screening (If Applicable): No results found for: COVID19        Anesthesia Evaluation  Patient summary reviewed and Nursing notes reviewed no history of anesthetic complications:   Airway: Mallampati: II  TM distance: >3 FB   Neck ROM: full  Mouth opening: > = 3 FB Dental: normal exam         Pulmonary:Negative Pulmonary ROS and normal exam                               Cardiovascular:  Exercise tolerance: good (>4 METS),   (+) hypertension:, dysrhythmias:,       ECG reviewed               Beta Blocker:  Not on Beta Blocker         Neuro/Psych:   (+) psychiatric history:            GI/Hepatic/Renal: Neg GI/Hepatic/Renal ROS            Endo/Other:    (+) Diabetes, . Pt had PAT visit. Abdominal:             Vascular: negative vascular ROS. Other Findings:             Anesthesia Plan      MAC     ASA 3       Induction: intravenous.                           ROSE Ny - CRNA   4/21/2022

## 2022-04-27 RX ORDER — PANTOPRAZOLE SODIUM 40 MG/1
40 TABLET, DELAYED RELEASE ORAL
Qty: 28 TABLET | Refills: 0 | Status: SHIPPED | OUTPATIENT
Start: 2022-04-27 | End: 2022-05-09

## 2022-04-27 RX ORDER — AMOXICILLIN 500 MG/1
1000 CAPSULE ORAL 2 TIMES DAILY
Qty: 56 CAPSULE | Refills: 0 | Status: SHIPPED | OUTPATIENT
Start: 2022-04-27 | End: 2022-05-11

## 2022-04-27 RX ORDER — CLARITHROMYCIN 500 MG/1
500 TABLET, COATED ORAL 2 TIMES DAILY
Qty: 28 TABLET | Refills: 0 | Status: SHIPPED | OUTPATIENT
Start: 2022-04-27 | End: 2022-05-11

## 2022-04-27 NOTE — TELEPHONE ENCOUNTER
Patient states Dr. Grabiel Frey removed a polyp from his stomach and states patient has a bacterial infection. He needs to be on an antibiotic and protonix for 2 weeks. Dr. Edwin Chino states the antibiotic cannot be taken with Lipitor. Is it ok if he discontinues for 2 weeks?     Call Back [phone]

## 2022-04-27 NOTE — TELEPHONE ENCOUNTER
Absolutely. Please hold the cholesterol medication. I appreciate Mr. Marleny Meraz calling to check.  I hope he feels better

## 2022-05-09 RX ORDER — PANTOPRAZOLE SODIUM 40 MG/1
TABLET, DELAYED RELEASE ORAL
Qty: 28 TABLET | Refills: 0 | Status: SHIPPED | OUTPATIENT
Start: 2022-05-09 | End: 2022-05-18

## 2022-05-13 RX ORDER — CLARITHROMYCIN 500 MG/1
TABLET, COATED ORAL
Qty: 28 TABLET | Refills: 0 | OUTPATIENT
Start: 2022-05-13

## 2022-05-13 RX ORDER — AMOXICILLIN 500 MG/1
CAPSULE ORAL
Qty: 56 CAPSULE | Refills: 0 | OUTPATIENT
Start: 2022-05-13

## 2022-05-18 RX ORDER — PANTOPRAZOLE SODIUM 40 MG/1
TABLET, DELAYED RELEASE ORAL
Qty: 28 TABLET | Refills: 0 | Status: SHIPPED | OUTPATIENT
Start: 2022-05-18 | End: 2022-05-31

## 2022-05-31 RX ORDER — PANTOPRAZOLE SODIUM 40 MG/1
TABLET, DELAYED RELEASE ORAL
Qty: 28 TABLET | Refills: 0 | Status: SHIPPED | OUTPATIENT
Start: 2022-05-31 | End: 2022-06-14

## 2022-06-02 ENCOUNTER — OFFICE VISIT (OUTPATIENT)
Dept: GASTROENTEROLOGY | Age: 77
End: 2022-06-02
Payer: COMMERCIAL

## 2022-06-02 VITALS
SYSTOLIC BLOOD PRESSURE: 116 MMHG | HEIGHT: 69 IN | WEIGHT: 186.6 LBS | OXYGEN SATURATION: 97 % | HEART RATE: 57 BPM | DIASTOLIC BLOOD PRESSURE: 60 MMHG | BODY MASS INDEX: 27.64 KG/M2

## 2022-06-02 DIAGNOSIS — K29.70 HELICOBACTER PYLORI GASTRITIS: Primary | ICD-10-CM

## 2022-06-02 DIAGNOSIS — B96.81 HELICOBACTER PYLORI GASTRITIS: Primary | ICD-10-CM

## 2022-06-02 PROCEDURE — 1036F TOBACCO NON-USER: CPT | Performed by: SPECIALIST

## 2022-06-02 PROCEDURE — G8427 DOCREV CUR MEDS BY ELIG CLIN: HCPCS | Performed by: SPECIALIST

## 2022-06-02 PROCEDURE — 99212 OFFICE O/P EST SF 10 MIN: CPT | Performed by: SPECIALIST

## 2022-06-02 PROCEDURE — 3017F COLORECTAL CA SCREEN DOC REV: CPT | Performed by: SPECIALIST

## 2022-06-02 PROCEDURE — G8417 CALC BMI ABV UP PARAM F/U: HCPCS | Performed by: SPECIALIST

## 2022-06-02 PROCEDURE — 1123F ACP DISCUSS/DSCN MKR DOCD: CPT | Performed by: SPECIALIST

## 2022-06-02 RX ORDER — ATORVASTATIN CALCIUM 40 MG/1
40 TABLET, FILM COATED ORAL DAILY
COMMUNITY

## 2022-06-02 ASSESSMENT — ENCOUNTER SYMPTOMS
ANAL BLEEDING: 0
RESPIRATORY NEGATIVE: 1
ABDOMINAL PAIN: 0
NAUSEA: 0
CONSTIPATION: 0
VOMITING: 0
BLOOD IN STOOL: 0
ABDOMINAL DISTENTION: 0
GASTROINTESTINAL NEGATIVE: 1
DIARRHEA: 0
EYES NEGATIVE: 1
RECTAL PAIN: 0

## 2022-06-02 NOTE — PROGRESS NOTES
Gastroenterology Clinic Follow up Visit    Giuseppe Chahal  [id number]  Chief Complaint   Patient presents with    Follow Up After Procedure     EGD 04/21/22       HPI and A/P at last visit summarized below:  Patient is here for follow-up, EGD showed a gastric polyp and the biopsy showed reactive gastropathy with H. pylori gastritis. Patient was treated with amoxicillin and Biaxin and PPI for 2 weeks. Patient feels okay, no abdominal pain no nausea or vomiting    Review of Systems   Constitutional: Negative. HENT: Negative. Eyes: Negative. Respiratory: Negative. Gastrointestinal: Negative. Negative for abdominal distention, abdominal pain, anal bleeding, blood in stool, constipation, diarrhea, nausea, rectal pain and vomiting. No GI issues currently   Genitourinary: Negative. Musculoskeletal: Negative. Neurological: Negative. Psychiatric/Behavioral: Negative. Past medical history, past surgical history, medication list, social and familyhistory reviewed    Blood pressure 116/60, pulse 57, height 5' 9\" (1.753 m), weight 186 lb 9.6 oz (84.6 kg), SpO2 97 %. Physical Exam  Constitutional:       Appearance: He is well-developed. HENT:      Head: Normocephalic. Eyes:      Pupils: Pupils are equal, round, and reactive to light. Cardiovascular:      Rate and Rhythm: Normal rate and regular rhythm. Heart sounds: Normal heart sounds. Pulmonary:      Effort: Pulmonary effort is normal.      Breath sounds: Normal breath sounds. Abdominal:      General: Bowel sounds are normal.      Palpations: Abdomen is soft. Skin:     General: Skin is warm and dry. Neurological:      Mental Status: He is alert. Laboratory, Pathology, Radiology reviewed in detail with relevantimportant investigations summarized below:    No results for input(s): WBC, HGB, HCT, MCV, PLT in the last 720 hours.   Lab Results   Component Value Date    ALT 14 09/20/2018    AST 13 09/20/2018 ALKPHOS 81 09/20/2018    BILITOT 0.5 09/20/2018     No results found. Endoscopic investigations:     Assessment and Plan:  Edgardo Barkley. 77 y.o. male for follow up. H. pylori gastritis patient was treated with amoxicillin Biaxin and PPI for 2 weeks, he feels okay now, will order stool H. pylori antigen to be done after 3 weeks   Diagnosis Orders   1. Helicobacter pylori gastritis  H. Pylori Antigen, Stool       Return in about 2 months (around 8/2/2022). Anahi Santillan MD   StaffGastroenterologist  [facility]    Please note this report has been partially produced using speech recognitionsoftware  and may cause contain errors related to that system including grammar, punctuation and spelling as well as words andphrases that may seem inappropriate. If there are questions or concerns please feel free to contact me to clarify.

## 2022-06-06 DIAGNOSIS — B96.81 HELICOBACTER PYLORI GASTRITIS: ICD-10-CM

## 2022-06-06 DIAGNOSIS — K29.70 HELICOBACTER PYLORI GASTRITIS: ICD-10-CM

## 2022-06-06 DIAGNOSIS — D64.9 NORMOCYTIC ANEMIA: ICD-10-CM

## 2022-06-06 LAB
FERRITIN: 22 NG/ML (ref 30–400)
IRON SATURATION: 16 % (ref 20–55)
IRON: 49 UG/DL (ref 59–158)
TOTAL IRON BINDING CAPACITY: 299 UG/DL (ref 250–450)
UNSATURATED IRON BINDING CAPACITY: 250 UG/DL (ref 112–347)

## 2022-06-08 LAB — H PYLORI ANTIGEN STOOL: NEGATIVE

## 2022-06-09 DIAGNOSIS — D64.9 NORMOCYTIC ANEMIA: Primary | ICD-10-CM

## 2022-06-09 NOTE — RESULT ENCOUNTER NOTE
Please let patient know there does appear to be low iron on labs orders a few months ago that were just done now. would like to see his blood count which was not rechecked. Order written.

## 2022-06-14 RX ORDER — PANTOPRAZOLE SODIUM 40 MG/1
TABLET, DELAYED RELEASE ORAL
Qty: 28 TABLET | Refills: 0 | Status: SHIPPED | OUTPATIENT
Start: 2022-06-14 | End: 2022-06-27

## 2022-06-17 RX ORDER — GLUCOSAMINE HCL/CHONDROITIN SU 500-400 MG
CAPSULE ORAL
Qty: 100 STRIP | Refills: 3 | Status: SHIPPED | OUTPATIENT
Start: 2022-06-17 | End: 2022-08-30 | Stop reason: SDUPTHER

## 2022-06-27 RX ORDER — PANTOPRAZOLE SODIUM 40 MG/1
TABLET, DELAYED RELEASE ORAL
Qty: 28 TABLET | Refills: 0 | Status: SHIPPED | OUTPATIENT
Start: 2022-06-27 | End: 2022-07-11

## 2022-07-11 RX ORDER — PANTOPRAZOLE SODIUM 40 MG/1
TABLET, DELAYED RELEASE ORAL
Qty: 28 TABLET | Refills: 0 | Status: SHIPPED | OUTPATIENT
Start: 2022-07-11 | End: 2022-08-30 | Stop reason: SDUPTHER

## 2022-08-03 ENCOUNTER — OFFICE VISIT (OUTPATIENT)
Dept: GASTROENTEROLOGY | Age: 77
End: 2022-08-03
Payer: COMMERCIAL

## 2022-08-03 VITALS
OXYGEN SATURATION: 98 % | BODY MASS INDEX: 27.47 KG/M2 | SYSTOLIC BLOOD PRESSURE: 100 MMHG | DIASTOLIC BLOOD PRESSURE: 70 MMHG | WEIGHT: 186 LBS | HEART RATE: 62 BPM

## 2022-08-03 DIAGNOSIS — K29.50 CHRONIC GASTRITIS WITHOUT BLEEDING, UNSPECIFIED GASTRITIS TYPE: Primary | ICD-10-CM

## 2022-08-03 PROCEDURE — G8427 DOCREV CUR MEDS BY ELIG CLIN: HCPCS | Performed by: SPECIALIST

## 2022-08-03 PROCEDURE — G8417 CALC BMI ABV UP PARAM F/U: HCPCS | Performed by: SPECIALIST

## 2022-08-03 PROCEDURE — 99212 OFFICE O/P EST SF 10 MIN: CPT | Performed by: SPECIALIST

## 2022-08-03 PROCEDURE — 1123F ACP DISCUSS/DSCN MKR DOCD: CPT | Performed by: SPECIALIST

## 2022-08-03 PROCEDURE — 1036F TOBACCO NON-USER: CPT | Performed by: SPECIALIST

## 2022-08-03 RX ORDER — PANTOPRAZOLE SODIUM 40 MG/1
40 TABLET, DELAYED RELEASE ORAL
Qty: 30 TABLET | Refills: 3 | Status: SHIPPED | OUTPATIENT
Start: 2022-08-03

## 2022-08-03 RX ORDER — ATORVASTATIN CALCIUM 20 MG/1
TABLET, FILM COATED ORAL
COMMUNITY
Start: 2022-07-31 | End: 2022-08-30

## 2022-08-03 RX ORDER — PANTOPRAZOLE SODIUM 40 MG/1
TABLET, DELAYED RELEASE ORAL
Qty: 28 TABLET | Refills: 0 | OUTPATIENT
Start: 2022-08-03

## 2022-08-03 ASSESSMENT — ENCOUNTER SYMPTOMS
ABDOMINAL PAIN: 0
RECTAL PAIN: 0
ABDOMINAL DISTENTION: 0
RESPIRATORY NEGATIVE: 1
DIARRHEA: 0
CONSTIPATION: 0
NAUSEA: 0
GASTROINTESTINAL NEGATIVE: 1
EYES NEGATIVE: 1
BLOOD IN STOOL: 0
ANAL BLEEDING: 0
VOMITING: 0

## 2022-08-03 NOTE — PROGRESS NOTES
Gastroenterology Clinic Follow up Visit    Gae Cabot  [id number]  Chief Complaint   Patient presents with    Follow-up       HPI and A/P at last visit summarized below:  Patient is here for follow-up, he was diagnosed to have H. pylori gastritis and was treated and follow-up stool study showed negative for H. pylori antigen, patient has no symptoms of GERD, patient has been on pantoprazole. It seems that the medication is alleviating some of his abdominal discomfort which he experienced after certain food,    Review of Systems   Constitutional: Negative. HENT: Negative. Eyes: Negative. Respiratory: Negative. Gastrointestinal: Negative. Negative for abdominal distention, abdominal pain, anal bleeding, blood in stool, constipation, diarrhea, nausea, rectal pain and vomiting. No active GI issues currently   Genitourinary: Negative. Musculoskeletal: Negative. Neurological: Negative. Psychiatric/Behavioral: Negative. Past medical history, past surgical history, medication list, social and familyhistory reviewed    Blood pressure 100/70, pulse 62, weight 186 lb (84.4 kg), SpO2 98 %. Physical Exam  Constitutional:       Appearance: He is well-developed. HENT:      Head: Normocephalic. Eyes:      Pupils: Pupils are equal, round, and reactive to light. Cardiovascular:      Rate and Rhythm: Normal rate and regular rhythm. Heart sounds: Normal heart sounds. Pulmonary:      Effort: Pulmonary effort is normal.      Breath sounds: Normal breath sounds. Abdominal:      General: Bowel sounds are normal.      Palpations: Abdomen is soft. Skin:     General: Skin is warm and dry. Neurological:      Mental Status: He is alert. Laboratory, Pathology, Radiology reviewed in detail with relevantimportant investigations summarized below:    No results for input(s): WBC, HGB, HCT, MCV, PLT in the last 720 hours.   Lab Results   Component Value Date    ALT 14 09/20/2018 AST 13 09/20/2018    ALKPHOS 81 09/20/2018    BILITOT 0.5 09/20/2018     No results found. Endoscopic investigations:     Assessment and Plan:  Silvestre Salma. 69 y.o. male for follow up. H. pylori gastritis which was treated successfully and stool study showed clearance of bacteria, patient has dyspeptic symptoms which seems to be controlled with pantoprazole. Shahbaz Cardoso He is due for surveillance colonoscopy in April 2024      No follow-ups on file. Katlyn Sarabia MD   StaffGastroenterologist  [facility]    Please note this report has been partially produced using speech recognitionsoftware  and may cause contain errors related to that system including grammar, punctuation and spelling as well as words andphrases that may seem inappropriate. If there are questions or concerns please feel free to contact me to clarify.

## 2022-08-08 RX ORDER — LANCETS 30 GAUGE
EACH MISCELLANEOUS
Qty: 100 EACH | Refills: 1 | Status: SHIPPED | OUTPATIENT
Start: 2022-08-08 | End: 2022-08-30 | Stop reason: SDUPTHER

## 2022-08-30 ENCOUNTER — OFFICE VISIT (OUTPATIENT)
Dept: FAMILY MEDICINE CLINIC | Age: 77
End: 2022-08-30
Payer: COMMERCIAL

## 2022-08-30 ENCOUNTER — OFFICE VISIT (OUTPATIENT)
Dept: ENDOCRINOLOGY | Age: 77
End: 2022-08-30
Payer: COMMERCIAL

## 2022-08-30 VITALS
DIASTOLIC BLOOD PRESSURE: 80 MMHG | TEMPERATURE: 96.7 F | BODY MASS INDEX: 27.25 KG/M2 | OXYGEN SATURATION: 98 % | HEIGHT: 69 IN | HEART RATE: 60 BPM | WEIGHT: 184 LBS | SYSTOLIC BLOOD PRESSURE: 136 MMHG

## 2022-08-30 VITALS
WEIGHT: 186 LBS | BODY MASS INDEX: 27.55 KG/M2 | HEIGHT: 69 IN | DIASTOLIC BLOOD PRESSURE: 85 MMHG | HEART RATE: 60 BPM | SYSTOLIC BLOOD PRESSURE: 128 MMHG | OXYGEN SATURATION: 98 %

## 2022-08-30 DIAGNOSIS — E83.52 HYPERCALCEMIA: ICD-10-CM

## 2022-08-30 DIAGNOSIS — E78.5 HYPERLIPIDEMIA WITH TARGET LDL LESS THAN 100: ICD-10-CM

## 2022-08-30 DIAGNOSIS — N18.31 STAGE 3A CHRONIC KIDNEY DISEASE (HCC): ICD-10-CM

## 2022-08-30 DIAGNOSIS — I10 ESSENTIAL HYPERTENSION, BENIGN: ICD-10-CM

## 2022-08-30 DIAGNOSIS — D64.9 NORMOCYTIC ANEMIA: ICD-10-CM

## 2022-08-30 DIAGNOSIS — Z76.0 MEDICATION REFILL: ICD-10-CM

## 2022-08-30 LAB
CHP ED QC CHECK: NORMAL
GLUCOSE BLD-MCNC: 114 MG/DL
HBA1C MFR BLD: 7.7 %

## 2022-08-30 PROCEDURE — 1036F TOBACCO NON-USER: CPT | Performed by: INTERNAL MEDICINE

## 2022-08-30 PROCEDURE — G8427 DOCREV CUR MEDS BY ELIG CLIN: HCPCS | Performed by: FAMILY MEDICINE

## 2022-08-30 PROCEDURE — G8417 CALC BMI ABV UP PARAM F/U: HCPCS | Performed by: FAMILY MEDICINE

## 2022-08-30 PROCEDURE — 82962 GLUCOSE BLOOD TEST: CPT | Performed by: INTERNAL MEDICINE

## 2022-08-30 PROCEDURE — 3051F HG A1C>EQUAL 7.0%<8.0%: CPT | Performed by: INTERNAL MEDICINE

## 2022-08-30 PROCEDURE — 3051F HG A1C>EQUAL 7.0%<8.0%: CPT | Performed by: FAMILY MEDICINE

## 2022-08-30 PROCEDURE — 1123F ACP DISCUSS/DSCN MKR DOCD: CPT | Performed by: FAMILY MEDICINE

## 2022-08-30 PROCEDURE — 1036F TOBACCO NON-USER: CPT | Performed by: FAMILY MEDICINE

## 2022-08-30 PROCEDURE — 99213 OFFICE O/P EST LOW 20 MIN: CPT | Performed by: INTERNAL MEDICINE

## 2022-08-30 PROCEDURE — 99214 OFFICE O/P EST MOD 30 MIN: CPT | Performed by: FAMILY MEDICINE

## 2022-08-30 PROCEDURE — G8417 CALC BMI ABV UP PARAM F/U: HCPCS | Performed by: INTERNAL MEDICINE

## 2022-08-30 PROCEDURE — G8427 DOCREV CUR MEDS BY ELIG CLIN: HCPCS | Performed by: INTERNAL MEDICINE

## 2022-08-30 PROCEDURE — 83036 HEMOGLOBIN GLYCOSYLATED A1C: CPT | Performed by: INTERNAL MEDICINE

## 2022-08-30 PROCEDURE — 1123F ACP DISCUSS/DSCN MKR DOCD: CPT | Performed by: INTERNAL MEDICINE

## 2022-08-30 RX ORDER — GLIMEPIRIDE 4 MG/1
TABLET ORAL
Qty: 180 TABLET | Refills: 1 | Status: SHIPPED | OUTPATIENT
Start: 2022-08-30

## 2022-08-30 RX ORDER — ZOSTER VACCINE RECOMBINANT, ADJUVANTED 50 MCG/0.5
0.5 KIT INTRAMUSCULAR SEE ADMIN INSTRUCTIONS
Qty: 0.5 ML | Refills: 0 | Status: CANCELLED | OUTPATIENT
Start: 2022-08-30 | End: 2023-02-26

## 2022-08-30 RX ORDER — LANCETS 30 GAUGE
EACH MISCELLANEOUS
Qty: 100 EACH | Refills: 1 | Status: SHIPPED | OUTPATIENT
Start: 2022-08-30

## 2022-08-30 RX ORDER — GLUCOSAMINE HCL/CHONDROITIN SU 500-400 MG
CAPSULE ORAL
Qty: 100 STRIP | Refills: 3 | Status: SHIPPED | OUTPATIENT
Start: 2022-08-30

## 2022-08-30 RX ORDER — DEXTROSE 4 G
TABLET,CHEWABLE ORAL
Qty: 100 EACH | Refills: 1 | Status: SHIPPED | OUTPATIENT
Start: 2022-08-30

## 2022-08-30 SDOH — ECONOMIC STABILITY: FOOD INSECURITY: WITHIN THE PAST 12 MONTHS, YOU WORRIED THAT YOUR FOOD WOULD RUN OUT BEFORE YOU GOT MONEY TO BUY MORE.: NEVER TRUE

## 2022-08-30 SDOH — ECONOMIC STABILITY: FOOD INSECURITY: WITHIN THE PAST 12 MONTHS, THE FOOD YOU BOUGHT JUST DIDN'T LAST AND YOU DIDN'T HAVE MONEY TO GET MORE.: NEVER TRUE

## 2022-08-30 ASSESSMENT — PATIENT HEALTH QUESTIONNAIRE - PHQ9
SUM OF ALL RESPONSES TO PHQ QUESTIONS 1-9: 0
SUM OF ALL RESPONSES TO PHQ QUESTIONS 1-9: 0
2. FEELING DOWN, DEPRESSED OR HOPELESS: 0
1. LITTLE INTEREST OR PLEASURE IN DOING THINGS: 0
SUM OF ALL RESPONSES TO PHQ QUESTIONS 1-9: 0
SUM OF ALL RESPONSES TO PHQ9 QUESTIONS 1 & 2: 0
SUM OF ALL RESPONSES TO PHQ QUESTIONS 1-9: 0

## 2022-08-30 ASSESSMENT — SOCIAL DETERMINANTS OF HEALTH (SDOH): HOW HARD IS IT FOR YOU TO PAY FOR THE VERY BASICS LIKE FOOD, HOUSING, MEDICAL CARE, AND HEATING?: NOT HARD AT ALL

## 2022-08-30 NOTE — PROGRESS NOTES
8/30/2022    Assessment:       Diagnosis Orders   1. 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 Medication refill              PLAN:     Orders Placed This Encounter   Procedures    Hemoglobin A1C     Standing Status:   Future     Standing Expiration Date:   2/87/1949    Basic Metabolic Panel, Fasting     Standing Status:   Future     Standing Expiration Date:   8/30/2023    POCT Glucose    POCT glycosylated hemoglobin (Hb A1C)     Orders Placed This Encounter   Medications    glimepiride (AMARYL) 4 MG tablet     Sig: bid     Dispense:  180 tablet     Refill:  1    metFORMIN (GLUCOPHAGE) 1000 MG tablet     Sig: take 1 tablet by mouth twice a day with meals     Dispense:  180 tablet     Refill:  3    RA Alcohol Swabs 70 % PADS     Sig: use as directed once daily     Dispense:  100 each     Refill:  1    SITagliptin (JANUVIA) 100 MG tablet     Sig: take 1 tablet by mouth once daily     Dispense:  90 tablet     Refill:  3    Lancets (ONETOUCH DELICA PLUS [id number]) MISC     Sig: Test BS 3x daily Dx E11.65     Dispense:  100 each     Refill:  1    blood glucose monitor strips     Sig: Pt test 3x daily     Dispense:  100 strip     Refill:  3           Orders Placed This Encounter   Procedures    POCT Glucose    POCT glycosylated hemoglobin (Hb A1C)     No orders of the defined types were placed in this encounter. No follow-ups on file.   Subjective:     Chief Complaint   Patient presents with    Diabetes     Vitals:    08/30/22 1310   BP: 128/85   Pulse: 60   SpO2: 98%   Weight: 186 lb (84.4 kg)   Height: 5' 9\" (1.753 m)     Wt Readings from Last 3 Encounters:   08/30/22 186 lb (84.4 kg)   08/03/22 186 lb (84.4 kg)   06/02/22 186 lb 9.6 oz (84.6 kg)     BP Readings from Last 3 Encounters:   08/30/22 128/85   08/03/22 100/70   06/02/22 116/60     Follow-up on type 2 diabetes patient on metformin glimepiride and Januvia hemoglobin A1c stable at 7.7 denies any hypoglycemia overall blood sugars close to 160    Diabetes  He presents for his follow-up diabetic visit. He has type 2 diabetes mellitus. There are no hypoglycemic associated symptoms. Pertinent negatives for diabetes include no polydipsia and no polyuria. There are no hypoglycemic complications. Symptoms are stable. Diabetic complications include impotence. Current diabetic treatment includes oral agent (triple therapy). His overall blood glucose range is 140-180 mg/dl. (Lab Results       Component                Value               Date                       LABA1C                   7.7                 08/30/2022              ) An ACE inhibitor/angiotensin II receptor blocker is being taken. Past Medical History:   Diagnosis Date    Abnormal EKG 2/25/2021    Adenocarcinoma of prostate, stage 2 ([facility] 2018    Jim 3+4, Dr Jassi Valdes  PSA 7.69 intermediate risk    Erectile dysfunction associated with type 2 diabetes mellitus [facility]     Essential hypertension, benign     Hyperlipidemia     Intellectual disability     Irregular heart rhythm 2/25/2021    Low HDL (under 40)     Obesity (BMI 30-39. 9)     Obesity (BMI 30-39. 9)     Pes planus of both feet 2015    Dr Collette Patience    S/P colonoscopy with polypectomy 2014, 2019    Dr Erick Castellanos 3a chronic kidney disease ([facility] 2/25/2021    Uncontrolled type 2 diabetes mellitus with microalbuminuria [facility]) 2007    Dr Chinyere Moya    Unspecified deformity of ankle and foot, acquired     Vitamin B12 deficiency anemia 2016     Past Surgical History:   Procedure Laterality Date    COLONOSCOPY  03/20/2014    Polyp, Diverticulosis, Internal Hemorrhoids-Dr Sanders    COLONOSCOPY N/A 4/11/2019    COLORECTAL CANCER SCREENING, NOT HIGH RISK performed by Nidhi Chen MD at [address] [facility]      Full upper/lower extractions    PROSTATE BIOPSY  1/2015    Dr Luis Armando Zavala N/A 4/21/2022    EGD DIAGNOSTIC ONLY performed by Wyatt Adams MD at [address] [place] [address] History    Marital status: Single     Spouse name: Not on file    Number of children: 2    Years of education: Not on file    Highest education level: Not on file   Occupational History    Occupation: [employer], others, SSI   Tobacco Use    Smoking status: Former     Packs/day: 0.50     Years: 53.00     Pack years: 26.50     Types: Cigarettes     Start date:      Quit date: 2004     Years since quittin.6    Smokeless tobacco: Never   Vaping Use    Vaping Use: Never used   Substance and Sexual Activity    Alcohol use: Not Currently     Comment: occasionally, beer- recovering alcoholic    Drug use: No    Sexual activity: Yes     Partners: Female   Other Topics Concern    Not on file   Social History Narrative    Born in [place], one of 6    Lives in a house in [place] with girlfriend    2 sons in [place], keeps in touch with one of them    Grands: many    Worked \"everywhere\", on disability         Hobbies: video games     Social Determinants of Health     Financial Resource Strain: Not on file   Food Insecurity: Not on file   Transportation Needs: Not on file   Physical Activity: Not on file   Stress: Not on file   Social Connections: Not on file   Intimate Partner Violence: Not on file   Housing Stability: Not on file     Family History   Problem Relation Age of Onset    Stroke Mother         dec age [de-identified]    Cancer Brother         liver, dec age 79    Diabetes Father     Cancer Father         prostate    Colon Cancer Neg Hx      No Known Allergies    Current Outpatient Medications:     Lancets (ONETOUCH DELICA PLUS [id number]) MISC, Test BS 3x daily Dx E11.65, Disp: 100 each, Rfl: 1    atorvastatin (LIPITOR) 20 MG tablet, , Disp: , Rfl:     pantoprazole (PROTONIX) 40 MG tablet, Take 1 tablet by mouth every morning (before breakfast), Disp: 30 tablet, Rfl: 3    blood glucose monitor strips, Pt test 3x daily, Disp: 100 strip, Rfl: 3 atorvastatin (LIPITOR) 40 MG tablet, Take 40 mg by mouth daily, Disp: , Rfl:     ammonium lactate (LAC-HYDRIN) 12 % lotion, Ammonium Lactate 12 % External Lotion APPLY AND RUB IN A THIN FILM TO AFFECTED AREAS TWICE DAILY. (AM AND PM).  Quantity: 0 Refills: 0 Ordered: 22-Oct-2021 DO Active, Disp: , Rfl:     glimepiride (AMARYL) 4 MG tablet, bid, Disp: 180 tablet, Rfl: 1    metFORMIN (GLUCOPHAGE) 1000 MG tablet, take 1 tablet by mouth twice a day with meals, Disp: 180 tablet, Rfl: 3    SITagliptin (JANUVIA) 100 MG tablet, take 1 tablet by mouth once daily, Disp: 90 tablet, Rfl: 3    cloNIDine (CATAPRES) 0.1 MG tablet, take 1 tablet by mouth twice a day, Disp: 180 tablet, Rfl: 4    aspirin (RA ASPIRIN EC) 81 MG EC tablet, take 1 tablet by mouth once daily, Disp: 90 tablet, Rfl: 4    amLODIPine-benazepril (LOTREL) 5-20 MG per capsule, take 1 capsule by mouth once daily, Disp: 90 capsule, Rfl: 4    Cyanocobalamin ER (RA VITAMIN B-12 TR) 1000 MCG TBCR, take 1 tablet by mouth once daily, Disp: 90 tablet, Rfl: 4    vitamin D (RA VITAMIN D-3) 25 MCG (1000 UT) TABS tablet, take 1 tablet by mouth once daily, Disp: 90 tablet, Rfl: 4    Lancets MISC, 1 each by In Vitro route 3 times daily DX: E11.65, IDDM (please dispense covered brand), Disp: 100 each, Rfl: 6    Blood Glucose Monitoring Suppl (ONE TOUCH ULTRA MINI) w/Device KIT, 1 kit by Does not apply route 3 times daily DX: E11.65, IDDM, Disp: 1 kit, Rfl: 0    metoprolol succinate (TOPROL XL) 25 MG extended release tablet, Take 25 mg by mouth daily , Disp: , Rfl:     RA Alcohol Swabs 70 % PADS, use as directed once daily, Disp: 100 each, Rfl: 1    acetaminophen (TYLENOL) 500 MG tablet, Take 1 tablet by mouth 4 times daily as needed for Pain, Disp: 120 tablet, Rfl: 2    sildenafil (VIAGRA) 100 MG tablet, Take 1 tablet by mouth daily as needed for Erectile Dysfunction, Disp: 5 tablet, Rfl: 11  Lab Results   Component Value Date     03/11/2022    K 4.7 03/11/2022     03/11/2022    CO2 22 03/11/2022    BUN 10 03/11/2022    CREATININE 1.05 03/11/2022    GLUCOSE 114 08/30/2022    CALCIUM 9.3 03/11/2022    PROT 7.6 09/20/2018    LABALBU 4.5 09/20/2018    BILITOT 0.5 09/20/2018    ALKPHOS 81 09/20/2018    AST 13 09/20/2018    ALT 14 09/20/2018    LABGLOM >60.0 03/11/2022    GFRAA >60.0 03/11/2022    GLOB 3.1 09/20/2018     Lab Results   Component Value Date    WBC 6.3 03/11/2022    HGB 10.5 (L) 03/11/2022    HCT 33.1 (L) 03/11/2022    MCV 82.8 03/11/2022     03/11/2022     Lab Results   Component Value Date    LABA1C 7.7 08/30/2022    LABA1C 7.7 (H) 03/11/2022    LABA1C 7.1 03/10/2022     Lab Results   Component Value Date    CHOLFAST 143 03/11/2022    TRIGLYCFAST 92 03/11/2022    HDL 35 (L) 03/11/2022    HDL 33 (L) 05/04/2020    HDL 29 (L) 02/05/2019    LDLCALC 90 03/11/2022    LDLCALC 76 05/04/2020    LDLCALC 92 02/05/2019    CHOL 127 05/04/2020    CHOL 140 02/05/2019    CHOL 151 10/05/2017    TRIG 90 05/04/2020    TRIG 93 02/05/2019    TRIG 89 10/05/2017     No results found for: TESTM  Lab Results   Component Value Date    TSH 1.530 05/04/2020    TSH 2.070 02/05/2016    TSH 1.840 02/17/2014     No results found for: TPOABS    Review of Systems   Endocrine: Negative for polydipsia and polyuria. Genitourinary:  Positive for impotence. Objective:   Physical Exam  Vitals reviewed. Constitutional:       General: He is not in acute distress. Appearance: Normal appearance. HENT:      Head: Normocephalic and atraumatic. Right Ear: External ear normal.      Left Ear: External ear normal.      Nose: Nose normal.   Eyes:      General: No scleral icterus. Right eye: No discharge. Left eye: No discharge. Extraocular Movements: Extraocular movements intact. Conjunctiva/sclera: Conjunctivae normal.   Cardiovascular:      Rate and Rhythm: Normal rate.    Pulmonary:      Effort: Pulmonary effort is normal.   Musculoskeletal:         General: Normal range of motion. Cervical back: Normal range of motion and neck supple. Neurological:      General: No focal deficit present. Mental Status: He is alert and oriented to person, place, and time.    Psychiatric:         Mood and Affect: Mood normal.         Behavior: Behavior normal.

## 2022-08-30 NOTE — PROGRESS NOTES
Range & Units 3/11/22 0831 8/17/21 1455 5/4/20 1308 9/20/18 1659 2/5/16 1415 1/23/15 1305 2/17/14 1121   WBC 4.8 - 10.8 K/uL 6.3  6.6  5.9  6.3  5.0  6.9  6.8    RBC 4.70 - 6.10 M/uL 4.00 Low   4.56 Low   4.46 Low   4.76  4.51 Low   4.99  4.69 Low     Hemoglobin 14.0 - 18.0 g/dL 10.5 Low   12.1 Low   11.7 Low   12.6 Low   11.9 Low   13.4 Low   12.8 Low     Hematocrit 42.0 - 52.0 % 33.1 Low   37.9 Low   37.0 Low   38.8 Low   37.4 Low   41.6 Low   38.7 Low     MCV 80.0 - 100.0 fL 82.8  83.0  82.9  81.4  82.9  83.5  82.6    MCH 27.0 - 31.3 pg 26.2 Low   26.4 Low   26.2 Low   26.5 Low   26.3 Low   26.8 Low   27.2    MCHC 33.0 - 37.0 % 31.7 Low   31.8 Low   31.6 Low   32.5 Low   31.8 Low   32.1 Low   33.0    RDW 11.5 - 14.5 % 18.8 High   16.2 High   16.6 High   16.1 High   16.0 High   15.6 High   15.3 High     Platelets 509 - 881 K/uL 269  227  230  243  257  259  202          No other questions and or concerns for today's visit            Past Medical History:   Diagnosis Date    Abnormal EKG 2/25/2021    Adenocarcinoma of prostate, stage 2 ([facility] 2018    Jim 3+4, Dr Thomas Revering  PSA 7.69 intermediate risk    Erectile dysfunction associated with type 2 diabetes mellitus ([address]     Essential hypertension, benign     Hyperlipidemia     Intellectual disability     Irregular heart rhythm 2/25/2021    Low HDL (under 40)     Obesity (BMI 30-39. 9)     Obesity (BMI 30-39. 9)     Pes planus of both feet 2015    Dr Brigid Martell    S/P colonoscopy with polypectomy 2014, 2019    Dr Armani Patel chronic kidney disease ([facility] 2/25/2021    Uncontrolled type 2 diabetes mellitus with microalbuminuria [facility]) 2007    Dr Isa Ibrahim    Unspecified deformity of ankle and foot, acquired     Vitamin B12 deficiency anemia 2016     Past Surgical History:   Procedure Laterality Date    COLONOSCOPY  03/20/2014    Polyp, Diverticulosis, Internal Hemorrhoids-Dr Sanders    COLONOSCOPY N/A 4/11/2019    COLORECTAL CANCER SCREENING, NOT HIGH RISK performed by Diabetes Father     Cancer Father         prostate    Colon Cancer Neg Hx      No Known Allergies  Current Outpatient Medications   Medication Sig Dispense Refill    glimepiride (AMARYL) 4 MG tablet bid 180 tablet 1    metFORMIN (GLUCOPHAGE) 1000 MG tablet take 1 tablet by mouth twice a day with meals 180 tablet 3    RA Alcohol Swabs 70 % PADS use as directed once daily 100 each 1    SITagliptin (JANUVIA) 100 MG tablet take 1 tablet by mouth once daily 90 tablet 3    Lancets (ONETOUCH DELICA PLUS [id number]) MISC Test BS 3x daily Dx E11.65 100 each 1    blood glucose monitor strips Pt test 3x daily 100 strip 3    pantoprazole (PROTONIX) 40 MG tablet Take 1 tablet by mouth every morning (before breakfast) 30 tablet 3    atorvastatin (LIPITOR) 40 MG tablet Take 40 mg by mouth daily      ammonium lactate (LAC-HYDRIN) 12 % lotion Ammonium Lactate 12 % External Lotion APPLY AND RUB IN A THIN FILM TO AFFECTED AREAS TWICE DAILY. (AM AND PM).  Quantity: 0 Refills: 0 Ordered: 22-Oct-2021 DO Active      cloNIDine (CATAPRES) 0.1 MG tablet take 1 tablet by mouth twice a day 180 tablet 4    aspirin (RA ASPIRIN EC) 81 MG EC tablet take 1 tablet by mouth once daily 90 tablet 4    amLODIPine-benazepril (LOTREL) 5-20 MG per capsule take 1 capsule by mouth once daily 90 capsule 4    Cyanocobalamin ER (RA VITAMIN B-12 TR) 1000 MCG TBCR take 1 tablet by mouth once daily 90 tablet 4    vitamin D (RA VITAMIN D-3) 25 MCG (1000 UT) TABS tablet take 1 tablet by mouth once daily 90 tablet 4    Lancets MISC 1 each by In Vitro route 3 times daily DX: E11.65, IDDM (please dispense covered brand) 100 each 6    Blood Glucose Monitoring Suppl (ONE TOUCH ULTRA MINI) w/Device KIT 1 kit by Does not apply route 3 times daily DX: E11.65, IDDM 1 kit 0    metoprolol succinate (TOPROL XL) 25 MG extended release tablet Take 25 mg by mouth daily       acetaminophen (TYLENOL) 500 MG tablet Take 1 tablet by mouth 4 times daily as needed for Pain 120 tablet 2 sildenafil (VIAGRA) 100 MG tablet Take 1 tablet by mouth daily as needed for Erectile Dysfunction 5 tablet 11     No current facility-administered medications for this visit. PMH, Surgical Hx, Family Hx, and Social Hxreviewed and updated. Health Maintenance reviewed. Objective    Vitals:    08/30/22 1421   BP: 136/80   Pulse: 60   Temp: (!) 96.7 °F (35.9 °C)   TempSrc: Tympanic   SpO2: 98%   Weight: 184 lb (83.5 kg)   Height: 5' 9\" (1.753 m)        Physical Exam  Constitutional:       Appearance: He is well-developed. HENT:      Head: Normocephalic and atraumatic. Comments: edentulous  Eyes:      Conjunctiva/sclera: Conjunctivae normal.   Pulmonary:      Effort: Pulmonary effort is normal.   Neurological:      Mental Status: He is alert and oriented to person, place, and time. Psychiatric:         Mood and Affect: Mood normal.         Behavior: Behavior normal.         Thought Content: Thought content normal.         Judgment: Judgment normal.         Lab Results   Component Value Date    LABA1C 7.7 08/30/2022    LABA1C 7.7 (H) 03/11/2022    LABA1C 7.1 03/10/2022     Lab Results   Component Value Date    LABMICR 1.50 02/05/2020    CREATININE 1.05 03/11/2022     Lab Results   Component Value Date    ALT 14 09/20/2018    AST 13 09/20/2018     Lab Results   Component Value Date    CHOL 127 05/04/2020    TRIG 90 05/04/2020    HDL 35 (L) 03/11/2022    [address] 90 03/11/2022        Assessment & Plan   Visit Diagnoses and Associated Orders       Uncontrolled type 2 diabetes mellitus with microalbuminuria ([facility]    -  Primary    worse, fair control; reviewed lab results, diet, exercise; attended by Dr. Chelsea Guevara whom he did see today. Hyperlipidemia with target LDL less than 100        Adherent to plan of care. Follow labs.          Stage 3a chronic kidney disease (HCC)        stable, well-controlled on current meds         Essential hypertension, benign        stable and well-controlled on current meds Normocytic anemia        reassess CBC, iron studies and B12    CBC with Auto Differential [id number] Custom]   - Future Order    Ferritin [[id number] Custom]   - Future Order    Iron and TIBC [[id number] Custom]   - Future Order    Vitamin B12 [[id number] Custom]   - Future Order         Hypercalcemia        reassess labs                   Reviewed with the patient: all disease processes, current clinical status, medications, activities and diet. Side effects, adverse effects of the medication prescribed today, as well as treatment plan/ rationale and result expectations have been discussed with the patient who expresses understanding and desires to proceed. Close follow up to evaluate treatment results and for coordination of care. I have reviewed the patient's medical history in detail and updated the computerized patient record. More than 50% of the appointment was spent in face-to-face counseling, education and care coordination. Orders Placed This Encounter   Procedures    CBC with Auto Differential     Standing Status:   Future     Standing Expiration Date:   8/30/2023    Ferritin     Standing Status:   Future     Standing Expiration Date:   8/30/2023    Iron and TIBC     Standing Status:   Future     Standing Expiration Date:   8/30/2023     Order Specific Question:   Is Patient Fasting? Answer:   0     Order Specific Question:   No of Hours? Answer:   0    Vitamin B12     Standing Status:   Future     Standing Expiration Date:   8/30/2023     No orders of the defined types were placed in this encounter. Medications Discontinued During This Encounter   Medication Reason    atorvastatin (LIPITOR) 20 MG tablet LIST CLEANUP     Return in about 6 months (around 2/28/2023). Controlled Substance Monitoring:    Acute and Chronic Pain Monitoring:   No flowsheet data found.         Scar Green MD

## 2022-11-16 ENCOUNTER — TELEMEDICINE (OUTPATIENT)
Dept: FAMILY MEDICINE CLINIC | Age: 77
End: 2022-11-16
Payer: COMMERCIAL

## 2022-11-16 DIAGNOSIS — Z00.00 MEDICARE ANNUAL WELLNESS VISIT, SUBSEQUENT: Primary | ICD-10-CM

## 2022-11-16 PROCEDURE — G0439 PPPS, SUBSEQ VISIT: HCPCS | Performed by: PHYSICIAN ASSISTANT

## 2022-11-16 PROCEDURE — 1123F ACP DISCUSS/DSCN MKR DOCD: CPT | Performed by: PHYSICIAN ASSISTANT

## 2022-11-16 PROCEDURE — G8484 FLU IMMUNIZE NO ADMIN: HCPCS | Performed by: PHYSICIAN ASSISTANT

## 2022-11-16 ASSESSMENT — PATIENT HEALTH QUESTIONNAIRE - PHQ9
SUM OF ALL RESPONSES TO PHQ QUESTIONS 1-9: 0
1. LITTLE INTEREST OR PLEASURE IN DOING THINGS: 0
SUM OF ALL RESPONSES TO PHQ QUESTIONS 1-9: 0
SUM OF ALL RESPONSES TO PHQ QUESTIONS 1-9: 0
SUM OF ALL RESPONSES TO PHQ9 QUESTIONS 1 & 2: 0
2. FEELING DOWN, DEPRESSED OR HOPELESS: 0
SUM OF ALL RESPONSES TO PHQ QUESTIONS 1-9: 0

## 2022-11-16 ASSESSMENT — LIFESTYLE VARIABLES: HOW OFTEN DO YOU HAVE A DRINK CONTAINING ALCOHOL: NEVER

## 2022-11-16 NOTE — PATIENT INSTRUCTIONS
Personalized Preventive Plan for Shaji Arenas 11/16/2022  Medicare offers a range of preventive health benefits. Some of the tests and screenings are paid in full while other may be subject to a deductible, co-insurance, and/or copay. Some of these benefits include a comprehensive review of your medical history including lifestyle, illnesses that may run in your family, and various assessments and screenings as appropriate. After reviewing your medical record and screening and assessments performed today your provider may have ordered immunizations, labs, imaging, and/or referrals for you. A list of these orders (if applicable) as well as your Preventive Care list are included within your After Visit Summary for your review. Other Preventive Recommendations:    A preventive eye exam performed by an eye specialist is recommended every 1-2 years to screen for glaucoma; cataracts, macular degeneration, and other eye disorders. A preventive dental visit is recommended every 6 months. Try to get at least 150 minutes of exercise per week or 10,000 steps per day on a pedometer . Order or download the FREE \"Exercise & Physical Activity: Your Everyday Guide\" from [employer]. Call 4-142.126.7914 or search [employer] online. You need 6337-3686 mg of calcium and 2111-1902 IU of vitamin D per day. It is possible to meet your calcium requirement with diet alone, but a vitamin D supplement is usually necessary to meet this goal.  When exposed to the sun, use a sunscreen that protects against both UVA and UVB radiation with an SPF of 30 or greater. Reapply every 2 to 3 hours or after sweating, drying off with a towel, or swimming. Always wear a seat belt when traveling in a car. Always wear a helmet when riding a bicycle or motorcycle.

## 2022-11-16 NOTE — PROGRESS NOTES
Medicare Annual Wellness Visit    Edgardo Knutson is here for Medicare AWV (/)    Assessment & Plan   Medicare annual wellness visit, subsequent    Recommendations for Preventive Services Due: see orders and patient instructions/AVS.  Recommended screening schedule for the next 5-10 years is provided to the patient in written form: see Patient Instructions/AVS.     Return for Medicare Annual Wellness Visit in 1 year. Subjective       Patient's complete Health Risk Assessment and screening values have been reviewed and are found in Flowsheets. The following problems were reviewed today and where indicated follow up appointments were made and/or referrals ordered. Positive Risk Factor Screenings with Interventions:              Health Habits/Nutrition:  Physical Activity: Sufficiently Active    Days of Exercise per Week: 5 days    Minutes of Exercise per Session: 40 min     Have you lost any weight without trying in the past 3 months?: No     Have you seen the dentist within the past year?: (!) No  Health Habits/Nutrition Interventions:  Dental exam overdue:  patient declines dental evaluation             Objective      Patient-Reported Vitals  No data recorded          No Known Allergies  Prior to Visit Medications    Medication Sig Taking?  Authorizing Provider   glimepiride (AMARYL) 4 MG tablet bid  Edil Wu MD   metFORMIN (GLUCOPHAGE) 1000 MG tablet take 1 tablet by mouth twice a day with meals  Edil Wu MD   RA Alcohol Swabs 70 % PADS use as directed once daily  Ilene Rutherford MD   SITagliptin (JANUVIA) 100 MG tablet take 1 tablet by mouth once daily  Ilene Rutherford MD   Lancets (ONETOUCH DELICA PLUS [id number]) MISC Test BS 3x daily Dx E11.65  Ilene Rutherford MD   blood glucose monitor strips Pt test 3x daily  Ilene Rutherford MD   pantoprazole (PROTONIX) 40 MG tablet Take 1 tablet by mouth every morning (before breakfast)  Teresa Vines MD   atorvastatin (LIPITOR) 40 MG tablet Take 40 mg by mouth daily Historical Provider, MD   ammonium lactate (LAC-HYDRIN) 12 % lotion Ammonium Lactate 12 % External Lotion APPLY AND RUB IN A THIN FILM TO AFFECTED AREAS TWICE DAILY. (AM AND PM). Quantity: 0 Refills: 0 Ordered: 22-Oct-2021 DO Active  Historical Provider, MD   cloNIDine (CATAPRES) 0.1 MG tablet take 1 tablet by mouth twice a day  Ruth Burr MD   aspirin (RA ASPIRIN EC) 81 MG EC tablet take 1 tablet by mouth once daily  Ruth Burr MD   amLODIPine-benazepril (LOTREL) 5-20 MG per capsule take 1 capsule by mouth once daily  Ruth Burr MD   Cyanocobalamin ER (RA VITAMIN B-12 TR) 1000 MCG TBCR take 1 tablet by mouth once daily  Ruth Burr MD   vitamin D (RA VITAMIN D-3) 25 MCG (1000 UT) TABS tablet take 1 tablet by mouth once daily  Ruth Burr MD   Lancets MISC 1 each by In Vitro route 3 times daily DX: E11.65, IDDM (please dispense covered brand)  Rolando Clements MD   Blood Glucose Monitoring Suppl (ONE TOUCH ULTRA MINI) w/Device KIT 1 kit by Does not apply route 3 times daily DX: E11.65, IDDM  Edil Wu MD   metoprolol succinate (TOPROL XL) 25 MG extended release tablet Take 25 mg by mouth daily   Historical Provider, MD   acetaminophen (TYLENOL) 500 MG tablet Take 1 tablet by mouth 4 times daily as needed for Pain  Kristopher Chou MD   sildenafil (VIAGRA) 100 MG tablet Take 1 tablet by mouth daily as needed for Erectile Dysfunction  Gillian Vogel MD       [facility] (Including outside providers/suppliers regularly involved in providing care):   Patient Care Team:  Arabella Dodd PA-C as PCP - General (Family Medicine)  Arabella Dodd PA-C as PCP - [facility] Empaneled Provider  Gillian Vogel MD (Urology)     Reviewed and updated this visit:  Allergies  Meds  Problems           Josphine Stefany., was evaluated through a synchronous (real-time) audio-video encounter.  The patient (or guardian if applicable) is aware that this is a billable service, which includes applicable co-pays. This Virtual Visit was conducted with patient's (and/or legal guardian's) consent. The visit was conducted pursuant to the emergency declaration under the [facility], [address] [place] [address] authority and the [employer] and [employer] General Act. Patient identification was verified, and a caregiver was present when appropriate. The patient was located at Home: [address] [place] [id number]. Provider was located at [facility] ([address] [place] [address]): Health system [address]  [address] [facility],  [id number] [place] [address].

## 2022-11-21 RX ORDER — PANTOPRAZOLE SODIUM 40 MG/1
40 TABLET, DELAYED RELEASE ORAL
Qty: 30 TABLET | Refills: 3 | Status: SHIPPED | OUTPATIENT
Start: 2022-11-21

## 2023-01-23 RX ORDER — LANCETS 30 GAUGE
EACH MISCELLANEOUS
Qty: 100 EACH | Refills: 3 | Status: SHIPPED | OUTPATIENT
Start: 2023-01-23

## 2023-02-06 DIAGNOSIS — C61 PROSTATE CANCER (HCC): ICD-10-CM

## 2023-02-06 LAB — PROSTATE SPECIFIC ANTIGEN: 0.16 NG/ML (ref 0–4)

## 2023-02-07 ENCOUNTER — OFFICE VISIT (OUTPATIENT)
Dept: UROLOGY | Age: 78
End: 2023-02-07
Payer: COMMERCIAL

## 2023-02-07 VITALS
OXYGEN SATURATION: 97 % | BODY MASS INDEX: 26.05 KG/M2 | HEART RATE: 71 BPM | WEIGHT: 175.9 LBS | SYSTOLIC BLOOD PRESSURE: 120 MMHG | DIASTOLIC BLOOD PRESSURE: 64 MMHG | HEIGHT: 69 IN

## 2023-02-07 DIAGNOSIS — C61 PROSTATE CANCER (HCC): Primary | ICD-10-CM

## 2023-02-07 PROCEDURE — 3078F DIAST BP <80 MM HG: CPT | Performed by: UROLOGY

## 2023-02-07 PROCEDURE — G8484 FLU IMMUNIZE NO ADMIN: HCPCS | Performed by: UROLOGY

## 2023-02-07 PROCEDURE — G8417 CALC BMI ABV UP PARAM F/U: HCPCS | Performed by: UROLOGY

## 2023-02-07 PROCEDURE — 1123F ACP DISCUSS/DSCN MKR DOCD: CPT | Performed by: UROLOGY

## 2023-02-07 PROCEDURE — 99213 OFFICE O/P EST LOW 20 MIN: CPT | Performed by: UROLOGY

## 2023-02-07 PROCEDURE — G8427 DOCREV CUR MEDS BY ELIG CLIN: HCPCS | Performed by: UROLOGY

## 2023-02-07 PROCEDURE — 1036F TOBACCO NON-USER: CPT | Performed by: UROLOGY

## 2023-02-07 PROCEDURE — 3074F SYST BP LT 130 MM HG: CPT | Performed by: UROLOGY

## 2023-02-07 NOTE — PROGRESS NOTES
[facility] EVALUATION NOTE                                                 H&P          Note:  Assessment and plan  Prostate cancer follow-up  56-year-old male who underwent radiation therapy hormonal therapy for prostate cancer Jim score 4+3  PSA on presentation was 7.69  Most recent PSA is 0.16 slightly lower than it was 2 to 6 months ago  Patient having no urinary issues      The note below is complete evaluation of patient on follow-up/consultation                                                                                                                                                 Reason for Visit  Prostate cancer follow-up    History of Present Illness  56-year-old male with history of prostate cancer treated with hormonal therapy and radiation therapy in 2015  Current PSA 0.16  Minimal voiding issues      Urologic Review of Systems/Symptoms  No issues with voiding    Review of Systems  Hospitalization: None recent  All 14 categories of Review of Systems otherwise reviewed no other findings reported. Patient continues to check his blood sugars daily  Past Medical History:   Diagnosis Date    Abnormal EKG 2/25/2021    Adenocarcinoma of prostate, stage 2 ([facility] 2018    Jim 3+4, Dr Antonia Frazier  PSA 7.69 intermediate risk    Erectile dysfunction associated with type 2 diabetes mellitus ([facility]     Essential hypertension, benign     Hyperlipidemia     Intellectual disability     Irregular heart rhythm 2/25/2021    Low HDL (under 40)     Obesity (BMI 30-39. 9)     Obesity (BMI 30-39. 9)     Pes planus of both feet 2015    Dr Nelson Schwab    S/P colonoscopy with polypectomy 2014, 2019    [facility] Patricia Velasco [facility] 2/25/2021    Uncontrolled type 2 diabetes mellitus with microalbuminuria 2007    Dr Agatha Primrose    Unspecified deformity of ankle and foot, acquired     Vitamin B12 deficiency anemia 2016     Past Surgical History:   Procedure Laterality Date    COLONOSCOPY  03/20/2014 Polyp, Diverticulosis, Internal Hemorrhoids-Dr Sanders    COLONOSCOPY N/A 2019    COLORECTAL CANCER SCREENING, NOT HIGH RISK performed by Shira Castillo MD at [address] [facility]      Full upper/lower extractions    PROSTATE BIOPSY  2015    Dr Moody Bills N/A 2022    EGD DIAGNOSTIC ONLY performed by Shira Castillo MD at [address] [facility] History    Marital status: Single     Spouse name: None    Number of children: 2    Years of education: None    Highest education level: None   Occupational History    Occupation: [employer], others, SSI   Tobacco Use    Smoking status: Former     Packs/day: 0.50     Years: 53.00     Pack years: 26.50     Types: Cigarettes     Start date:      Quit date:      Years since quittin.1    Smokeless tobacco: Never   Vaping Use    Vaping Use: Never used   Substance and Sexual Activity    Alcohol use: Not Currently     Comment: occasionally, beer- recovering alcoholic    Drug use: No    Sexual activity: Yes     Partners: Female   Social History Narrative    Born in [place], one of 6    Lives in a house in [place] with girlfriend    2 sons in [place], keeps in touch with one of them    Grands: many    Worked \"everywhere\", on disability         Hobbies: video games     Social Determinants of Health     Financial Resource Strain: Low Risk     Difficulty of Paying Living Expenses: Not hard at all   Food Insecurity: No Food Insecurity    Worried About [address] in the Last Year: Never true    [address] in the Last Year: Never true   Transportation Needs: No Transportation Needs    Lack of Transportation (Medical): No    Lack of Transportation (Non-Medical):  No   Physical Activity: Sufficiently Active    Days of Exercise per Week: 5 days    Minutes of Exercise per Session: 40 min     Family History   Problem Relation Age of Onset    Stroke Mother         dec age [de-identified] Cancer Brother         liver, dec age 79    Diabetes Father     Cancer Father         prostate    Colon Cancer Neg Hx      Current Outpatient Medications   Medication Sig Dispense Refill    Lancets (Sandrea Layer PLUS [id number]) MISC use 1 LANCET to TEST BLOOD SUGAR three times a day 100 each 3    pantoprazole (PROTONIX) 40 MG tablet TAKE 1 TABLET BY MOUTH EVERY MORNING (BEFORE BREAKFAST) 30 tablet 3    metFORMIN (GLUCOPHAGE) 1000 MG tablet take 1 tablet by mouth twice a day with meals 180 tablet 3    RA Alcohol Swabs 70 % PADS use as directed once daily 100 each 1    SITagliptin (JANUVIA) 100 MG tablet take 1 tablet by mouth once daily 90 tablet 3    blood glucose monitor strips Pt test 3x daily 100 strip 3    atorvastatin (LIPITOR) 40 MG tablet Take 40 mg by mouth daily      ammonium lactate (LAC-HYDRIN) 12 % lotion Ammonium Lactate 12 % External Lotion APPLY AND RUB IN A THIN FILM TO AFFECTED AREAS TWICE DAILY. (AM AND PM).  Quantity: 0 Refills: 0 Ordered: 22-Oct-2021 DO Active      cloNIDine (CATAPRES) 0.1 MG tablet take 1 tablet by mouth twice a day 180 tablet 4    aspirin (RA ASPIRIN EC) 81 MG EC tablet take 1 tablet by mouth once daily 90 tablet 4    amLODIPine-benazepril (LOTREL) 5-20 MG per capsule take 1 capsule by mouth once daily 90 capsule 4    Cyanocobalamin ER (RA VITAMIN B-12 TR) 1000 MCG TBCR take 1 tablet by mouth once daily 90 tablet 4    vitamin D (RA VITAMIN D-3) 25 MCG (1000 UT) TABS tablet take 1 tablet by mouth once daily 90 tablet 4    Lancets MISC 1 each by In Vitro route 3 times daily DX: E11.65, IDDM (please dispense covered brand) 100 each 6    Blood Glucose Monitoring Suppl (ONE TOUCH ULTRA MINI) w/Device KIT 1 kit by Does not apply route 3 times daily DX: E11.65, IDDM 1 kit 0    metoprolol succinate (TOPROL XL) 25 MG extended release tablet Take 25 mg by mouth daily       acetaminophen (TYLENOL) 500 MG tablet Take 1 tablet by mouth 4 times daily as needed for Pain 120 tablet 2 sildenafil (VIAGRA) 100 MG tablet Take 1 tablet by mouth daily as needed for Erectile Dysfunction 5 tablet 11    glimepiride (AMARYL) 4 MG tablet bid 180 tablet 1     No current facility-administered medications for this visit. Patient has no known allergies. All reviewed and verified by Dr Vasiel Valencia on today's visit    PSA   Date Value Ref Range Status   02/06/2023 0.16 0.00 - 4.00 ng/mL Final   02/07/2022 0.22 0.00 - 4.00 ng/mL Final   01/28/2021 0.25 0.00 - 6.22 ng/mL Final   08/12/2020 0.22 0.00 - 6.22 ng/mL Final   02/05/2020 0.24 0.00 - 6.22 ng/mL Final     Diagnostic Psa   Date Value Ref Range Status   12/04/2014 7.69 (H) 0.00 - 5.40 ng/mL Final   07/15/2014 6.31 (H) 0.00 - 5.40 ng/mL Final     No results found for this visit on 02/07/23. Physical Exam  Vitals:    02/07/23 1131   BP: 120/64   Pulse: 71   SpO2: 97%   Weight: 175 lb 14.4 oz (79.8 kg)   Height: 5' 9\" (1.753 m)     Constitutional: Not in distress. Urologic Exam  No change in neurologic exam  Additional findings  PSAs reviewed  Remainder the physical exam is normal  Assessment/Medical Necessity-Decision Making  Prostate cancer diagnosed in 2015 with excellent PSA control since treatment in 2016  Plan  PSA 1 year with follow-up  Greater than 50% of 20 minutes spent consulting patient face-to-face  Orders Placed This Encounter   Procedures    PSA, Diagnostic     Standing Status:   Future     Standing Expiration Date:   2/7/2024     No orders of the defined types were placed in this encounter. Brooks Tee MD       Please note this report has been partially produced using speech recognition software  And may cause contain errors related to that system including grammar, punctuation and spelling as well as words and phrases that may seem inappropriate. If there are questions or concerns please feel free to contact me to clarify.

## 2023-02-28 ENCOUNTER — OFFICE VISIT (OUTPATIENT)
Dept: FAMILY MEDICINE CLINIC | Age: 78
End: 2023-02-28
Payer: COMMERCIAL

## 2023-02-28 VITALS
BODY MASS INDEX: 26.07 KG/M2 | OXYGEN SATURATION: 98 % | WEIGHT: 176 LBS | HEART RATE: 71 BPM | TEMPERATURE: 98.1 F | DIASTOLIC BLOOD PRESSURE: 66 MMHG | SYSTOLIC BLOOD PRESSURE: 138 MMHG | HEIGHT: 69 IN

## 2023-02-28 DIAGNOSIS — E78.5 HYPERLIPIDEMIA WITH TARGET LDL LESS THAN 100: ICD-10-CM

## 2023-02-28 DIAGNOSIS — D64.9 NORMOCYTIC ANEMIA: ICD-10-CM

## 2023-02-28 DIAGNOSIS — E11.9 TYPE 2 DIABETES MELLITUS WITHOUT COMPLICATION, WITHOUT LONG-TERM CURRENT USE OF INSULIN (HCC): Primary | ICD-10-CM

## 2023-02-28 DIAGNOSIS — I10 ESSENTIAL HYPERTENSION, BENIGN: ICD-10-CM

## 2023-02-28 DIAGNOSIS — D51.9 ANEMIA DUE TO VITAMIN B12 DEFICIENCY, UNSPECIFIED B12 DEFICIENCY TYPE: ICD-10-CM

## 2023-02-28 DIAGNOSIS — N18.31 STAGE 3A CHRONIC KIDNEY DISEASE (HCC): Chronic | ICD-10-CM

## 2023-02-28 DIAGNOSIS — E11.9 TYPE 2 DIABETES MELLITUS WITHOUT COMPLICATION, WITHOUT LONG-TERM CURRENT USE OF INSULIN (HCC): ICD-10-CM

## 2023-02-28 LAB
ALBUMIN SERPL-MCNC: 4.1 G/DL (ref 3.5–4.6)
ALP BLD-CCNC: 71 U/L (ref 35–104)
ALT SERPL-CCNC: 8 U/L (ref 0–41)
ANION GAP SERPL CALCULATED.3IONS-SCNC: 13 MEQ/L (ref 9–15)
AST SERPL-CCNC: 9 U/L (ref 0–40)
BILIRUB SERPL-MCNC: 0.5 MG/DL (ref 0.2–0.7)
BUN BLDV-MCNC: 14 MG/DL (ref 8–23)
CALCIUM SERPL-MCNC: 8.9 MG/DL (ref 8.5–9.9)
CHLORIDE BLD-SCNC: 100 MEQ/L (ref 95–107)
CHOLESTEROL, FASTING: 123 MG/DL (ref 0–199)
CO2: 25 MEQ/L (ref 20–31)
CREAT SERPL-MCNC: 1.58 MG/DL (ref 0.7–1.2)
GFR SERPL CREATININE-BSD FRML MDRD: 44.9 ML/MIN/{1.73_M2}
GLOBULIN: 2.9 G/DL (ref 2.3–3.5)
GLUCOSE BLD-MCNC: 140 MG/DL (ref 70–99)
HBA1C MFR BLD: 7.3 % (ref 4.8–5.9)
HCT VFR BLD CALC: 36.4 % (ref 42–52)
HDLC SERPL-MCNC: 28 MG/DL (ref 40–59)
HEMOGLOBIN: 11.5 G/DL (ref 14–18)
LDL CHOLESTEROL CALCULATED: 75 MG/DL (ref 0–129)
MCH RBC QN AUTO: 25.7 PG (ref 27–31.3)
MCHC RBC AUTO-ENTMCNC: 31.6 % (ref 33–37)
MCV RBC AUTO: 81.2 FL (ref 79–92.2)
PDW BLD-RTO: 17.1 % (ref 11.5–14.5)
PLATELET # BLD: 249 K/UL (ref 130–400)
POTASSIUM SERPL-SCNC: 3.6 MEQ/L (ref 3.4–4.9)
RBC # BLD: 4.49 M/UL (ref 4.7–6.1)
SODIUM BLD-SCNC: 138 MEQ/L (ref 135–144)
TOTAL PROTEIN: 7 G/DL (ref 6.3–8)
TRIGLYCERIDE, FASTING: 99 MG/DL (ref 0–150)
WBC # BLD: 8 K/UL (ref 4.8–10.8)

## 2023-02-28 PROCEDURE — 3051F HG A1C>EQUAL 7.0%<8.0%: CPT | Performed by: PHYSICIAN ASSISTANT

## 2023-02-28 PROCEDURE — 1036F TOBACCO NON-USER: CPT | Performed by: PHYSICIAN ASSISTANT

## 2023-02-28 PROCEDURE — G8484 FLU IMMUNIZE NO ADMIN: HCPCS | Performed by: PHYSICIAN ASSISTANT

## 2023-02-28 PROCEDURE — G8417 CALC BMI ABV UP PARAM F/U: HCPCS | Performed by: PHYSICIAN ASSISTANT

## 2023-02-28 PROCEDURE — 99214 OFFICE O/P EST MOD 30 MIN: CPT | Performed by: PHYSICIAN ASSISTANT

## 2023-02-28 PROCEDURE — G8427 DOCREV CUR MEDS BY ELIG CLIN: HCPCS | Performed by: PHYSICIAN ASSISTANT

## 2023-02-28 PROCEDURE — 3078F DIAST BP <80 MM HG: CPT | Performed by: PHYSICIAN ASSISTANT

## 2023-02-28 PROCEDURE — 3075F SYST BP GE 130 - 139MM HG: CPT | Performed by: PHYSICIAN ASSISTANT

## 2023-02-28 PROCEDURE — 1123F ACP DISCUSS/DSCN MKR DOCD: CPT | Performed by: PHYSICIAN ASSISTANT

## 2023-02-28 RX ORDER — CLONIDINE HYDROCHLORIDE 0.1 MG/1
TABLET ORAL
Qty: 180 TABLET | Refills: 4 | Status: SHIPPED | OUTPATIENT
Start: 2023-02-28

## 2023-02-28 SDOH — ECONOMIC STABILITY: FOOD INSECURITY: WITHIN THE PAST 12 MONTHS, THE FOOD YOU BOUGHT JUST DIDN'T LAST AND YOU DIDN'T HAVE MONEY TO GET MORE.: NEVER TRUE

## 2023-02-28 SDOH — ECONOMIC STABILITY: INCOME INSECURITY: HOW HARD IS IT FOR YOU TO PAY FOR THE VERY BASICS LIKE FOOD, HOUSING, MEDICAL CARE, AND HEATING?: NOT HARD AT ALL

## 2023-02-28 SDOH — ECONOMIC STABILITY: HOUSING INSECURITY
IN THE LAST 12 MONTHS, WAS THERE A TIME WHEN YOU DID NOT HAVE A STEADY PLACE TO SLEEP OR SLEPT IN A SHELTER (INCLUDING NOW)?: NO

## 2023-02-28 SDOH — ECONOMIC STABILITY: FOOD INSECURITY: WITHIN THE PAST 12 MONTHS, YOU WORRIED THAT YOUR FOOD WOULD RUN OUT BEFORE YOU GOT MONEY TO BUY MORE.: NEVER TRUE

## 2023-02-28 ASSESSMENT — ENCOUNTER SYMPTOMS
PHOTOPHOBIA: 0
NAUSEA: 0
SHORTNESS OF BREATH: 0
COLOR CHANGE: 0
BLOOD IN STOOL: 0
DIARRHEA: 0
CHEST TIGHTNESS: 0
ABDOMINAL PAIN: 0
VOMITING: 0

## 2023-02-28 ASSESSMENT — PATIENT HEALTH QUESTIONNAIRE - PHQ9
1. LITTLE INTEREST OR PLEASURE IN DOING THINGS: 0
SUM OF ALL RESPONSES TO PHQ QUESTIONS 1-9: 0
SUM OF ALL RESPONSES TO PHQ QUESTIONS 1-9: 0
SUM OF ALL RESPONSES TO PHQ9 QUESTIONS 1 & 2: 0
SUM OF ALL RESPONSES TO PHQ QUESTIONS 1-9: 0
2. FEELING DOWN, DEPRESSED OR HOPELESS: 0
SUM OF ALL RESPONSES TO PHQ QUESTIONS 1-9: 0

## 2023-02-28 NOTE — PROGRESS NOTES
Subjective  Frank Mcfadden, 69 y.o. male presents today with:  Chief Complaint   Patient presents with    Follow-up    Diabetes    Other     Pt need a clonidine  refill      HPI  In the office today for follow up. Last OV with me: 11/16/22 via VV for AWV. Overall, feeling OK. Due for routine labs. History of prostate CA. Followed by Dr. Antonia Frazier. Treated with hormonal and radiation therapy in 2015. Serial PSAs have been stable. Very little LUTS. HTN controlled with current medication regimen. Needs clonodine refilled. Denies chest pain, tightness, SOB. No tobacco use. Occasional alcohol. Compliant with his diabetes medications. Denies side effects from medications. Due for HgbA1c. Lost his significant other last week; she was living in the nursing home due to chronic medical conditions. Death was still unexpected. Doing 'ok\". Review of Systems   Constitutional:  Negative for activity change, appetite change, chills, fatigue, fever and unexpected weight change. HENT:  Negative for congestion and nosebleeds. Eyes:  Negative for photophobia. Respiratory:  Negative for chest tightness and shortness of breath. Cardiovascular:  Negative for chest pain, palpitations and leg swelling. Gastrointestinal:  Negative for abdominal pain, blood in stool, diarrhea, nausea and vomiting. Genitourinary:  Negative for decreased urine volume, difficulty urinating, frequency and urgency. Musculoskeletal:  Negative for arthralgias and myalgias. Skin:  Negative for color change and rash. Neurological:  Negative for dizziness and headaches. Hematological:  Does not bruise/bleed easily. Psychiatric/Behavioral:  Positive for dysphoric mood. Negative for agitation and sleep disturbance. Decreased concentration: grief. The patient is not nervous/anxious.       Past Medical History:   Diagnosis Date    Abnormal EKG 2/25/2021    Adenocarcinoma of prostate, stage 2 ([facility] 2018 Jim 3+4, Dr Edi Bobby  PSA 7.69 intermediate risk    Erectile dysfunction associated with type 2 diabetes mellitus ([facility]     Essential hypertension, benign     Hyperlipidemia     Intellectual disability     Irregular heart rhythm 2021    Low HDL (under 40)     Obesity (BMI 30-39. 9)     Obesity (BMI 30-39. 9)     Pes planus of both feet     Dr Yunior Romero    S/P colonoscopy with polypectomy ,     Dr Iram Cox chronic kidney disease ([facility] 2021    Uncontrolled type 2 diabetes mellitus with microalbuminuria     Dr Lul Olguin    Unspecified deformity of ankle and foot, acquired     Vitamin B12 deficiency anemia      Past Surgical History:   Procedure Laterality Date    COLONOSCOPY  2014    Polyp, Diverticulosis, Internal Hemorrhoids-Dr Sanders    COLONOSCOPY N/A 2019    COLORECTAL CANCER SCREENING, NOT HIGH RISK performed by Sophia Saravia MD at [address] [facility]      Full upper/lower extractions    PROSTATE BIOPSY  2015    Dr Sidra Cantu 2022    EGD DIAGNOSTIC ONLY performed by Sophia Saravia MD at [address] [facility] History    Marital status: Single     Spouse name: Not on file    Number of children: 2    Years of education: Not on file    Highest education level: Not on file   Occupational History    Occupation: [employer], others, SSI   Tobacco Use    Smoking status: Former     Packs/day: 0.50     Years: 53.00     Pack years: 26.50     Types: Cigarettes     Start date:      Quit date:      Years since quittin.1    Smokeless tobacco: Never   Vaping Use    Vaping Use: Never used   Substance and Sexual Activity    Alcohol use: Not Currently     Comment: occasionally, beer- recovering alcoholic    Drug use: No    Sexual activity: Yes     Partners: Female   Other Topics Concern    Not on file   Social History Narrative    Born in [place], one of 11    Lives in a house in [place] with girlfriend    2 sons in [place], keeps in touch with one of them    Grands: many    Worked \"everywhere\", on disability         Hobbies: video games     Social Determinants of Health     Financial Resource Strain: Low Risk     Difficulty of Paying Living Expenses: Not hard at all   Food Insecurity: No Food Insecurity    Worried About [address] in the Last Year: Never true    [address] in the Last Year: Never true   Transportation Needs: No Transportation Needs    Lack of Transportation (Medical): No    Lack of Transportation (Non-Medical):  No   Physical Activity: Sufficiently Active    Days of Exercise per Week: 5 days    Minutes of Exercise per Session: 40 min   Stress: Not on file   Social Connections: Not on file   Intimate Partner Violence: Not on file   Housing Stability: Unknown    Unable to Pay for Housing in the Last Year: Not on file    Number of Places Lived in the Last Year: Not on file    Unstable Housing in the Last Year: No     Family History   Problem Relation Age of Onset    Stroke Mother         dec age [de-identified]    Cancer Brother         liver, dec age 79    Diabetes Father     Cancer Father         prostate    Colon Cancer Neg Hx      No Known Allergies  Current Outpatient Medications   Medication Sig Dispense Refill    cloNIDine (CATAPRES) 0.1 MG tablet take 1 tablet by mouth twice a day 180 tablet 4    Lancets (ONETOUCH DELICA PLUS [id number]) MISC use 1 LANCET to TEST BLOOD SUGAR three times a day 100 each 3    pantoprazole (PROTONIX) 40 MG tablet TAKE 1 TABLET BY MOUTH EVERY MORNING (BEFORE BREAKFAST) 30 tablet 3    glimepiride (AMARYL) 4 MG tablet bid 180 tablet 1    metFORMIN (GLUCOPHAGE) 1000 MG tablet take 1 tablet by mouth twice a day with meals 180 tablet 3    RA Alcohol Swabs 70 % PADS use as directed once daily 100 each 1    SITagliptin (JANUVIA) 100 MG tablet take 1 tablet by mouth once daily 90 tablet 3    blood glucose monitor strips Pt test 3x daily 100 strip 3 atorvastatin (LIPITOR) 40 MG tablet Take 40 mg by mouth daily      ammonium lactate (LAC-HYDRIN) 12 % lotion Ammonium Lactate 12 % External Lotion APPLY AND RUB IN A THIN FILM TO AFFECTED AREAS TWICE DAILY. (AM AND PM). Quantity: 0 Refills: 0 Ordered: 22-Oct-2021 DO Active      aspirin (RA ASPIRIN EC) 81 MG EC tablet take 1 tablet by mouth once daily 90 tablet 4    amLODIPine-benazepril (LOTREL) 5-20 MG per capsule take 1 capsule by mouth once daily 90 capsule 4    Cyanocobalamin ER (RA VITAMIN B-12 TR) 1000 MCG TBCR take 1 tablet by mouth once daily 90 tablet 4    vitamin D (RA VITAMIN D-3) 25 MCG (1000 UT) TABS tablet take 1 tablet by mouth once daily 90 tablet 4    Lancets MISC 1 each by In Vitro route 3 times daily DX: E11.65, IDDM (please dispense covered brand) 100 each 6    Blood Glucose Monitoring Suppl (ONE TOUCH ULTRA MINI) w/Device KIT 1 kit by Does not apply route 3 times daily DX: E11.65, IDDM 1 kit 0    metoprolol succinate (TOPROL XL) 25 MG extended release tablet Take 25 mg by mouth daily       acetaminophen (TYLENOL) 500 MG tablet Take 1 tablet by mouth 4 times daily as needed for Pain 120 tablet 2    sildenafil (VIAGRA) 100 MG tablet Take 1 tablet by mouth daily as needed for Erectile Dysfunction 5 tablet 11     No current facility-administered medications for this visit. PMH, Surgical Hx, Family Hx, and Social Hx reviewed and updated. Health Maintenance reviewed. Objective  Vitals:    02/28/23 1509   BP: 138/66   Pulse: 71   Temp: 98.1 °F (36.7 °C)   SpO2: 98%   Weight: 176 lb (79.8 kg)   Height: 5' 9\" (1.753 m)     BP Readings from Last 3 Encounters:   02/28/23 138/66   02/07/23 120/64   08/30/22 136/80     Wt Readings from Last 3 Encounters:   02/28/23 176 lb (79.8 kg)   02/07/23 175 lb 14.4 oz (79.8 kg)   08/30/22 184 lb (83.5 kg)     Physical Exam  Constitutional:       General: He is not in acute distress. Appearance: He is well-developed. He is not diaphoretic.    HENT: Head: Normocephalic and atraumatic. Right Ear: External ear normal. Decreased hearing noted. Left Ear: External ear normal. Decreased hearing noted. Nose: Nose normal.   Eyes:      Conjunctiva/sclera: Conjunctivae normal.   Cardiovascular:      Rate and Rhythm: Normal rate and regular rhythm. Pulmonary:      Effort: Pulmonary effort is normal.      Breath sounds: Normal breath sounds. Musculoskeletal:      Cervical back: Normal range of motion. Skin:     General: Skin is warm and dry. Neurological:      Mental Status: He is alert and oriented to person, place, and time. Cranial Nerves: No cranial nerve deficit. Assessment & Plan    Diagnosis Orders   1. Type 2 diabetes mellitus without complication, without long-term current use of insulin (HCC)  Comprehensive Metabolic Panel    Hemoglobin A1C      2. Essential hypertension, benign  cloNIDine (CATAPRES) 0.1 MG tablet    Comprehensive Metabolic Panel      3. Hyperlipidemia with target LDL less than 100  Lipid, Fasting      4. Normocytic anemia  CBC    Vitamin B12 & Folate    Ferritin    Iron and TIBC      5. Stage 3a chronic kidney disease (HCC)  CBC    Comprehensive Metabolic Panel      6. Anemia due to vitamin B12 deficiency, unspecified B12 deficiency type  Vitamin B12 & Folate      Routine labs. Continue current medications. 6 month follow up with me. Contact office with any questions or concerns.      Orders Placed This Encounter   Procedures    CBC     Standing Status:   Future     Number of Occurrences:   1     Standing Expiration Date:   2/28/2024    Comprehensive Metabolic Panel     Standing Status:   Future     Number of Occurrences:   1     Standing Expiration Date:   2/28/2024    Hemoglobin A1C     Standing Status:   Future     Number of Occurrences:   1     Standing Expiration Date:   2/28/2024    Vitamin B12 & Folate     Standing Status:   Future     Number of Occurrences:   1     Standing Expiration Date:   2/28/2024 Ferritin     Standing Status:   Future     Number of Occurrences:   1     Standing Expiration Date:   2/28/2024    Iron and TIBC     Standing Status:   Future     Number of Occurrences:   1     Standing Expiration Date:   2/28/2024    Lipid, Fasting     Standing Status:   Future     Number of Occurrences:   1     Standing Expiration Date:   2/28/2024     Orders Placed This Encounter   Medications    cloNIDine (CATAPRES) 0.1 MG tablet     Sig: take 1 tablet by mouth twice a day     Dispense:  180 tablet     Refill:  4     Medications Discontinued During This Encounter   Medication Reason    cloNIDine (CATAPRES) 0.1 MG tablet REORDER     No follow-ups on file. Reviewed with the patient: current clinical status, medications, activities and diet. Side effects, adverse effects of the medication prescribed today, as well as treatment plan/ rationale and result expectations have been discussed with the patient who expresses understanding and desires to proceed. Close follow up to evaluate treatment results and for coordination of care. I have reviewed the patient's medical history in detail and updated the computerized patient record.     Sameera Wilson PA-C

## 2023-03-01 LAB
FERRITIN: 47 NG/ML (ref 30–400)
FOLATE: 8.4 NG/ML
IRON SATURATION: 16 % (ref 20–55)
IRON: 40 UG/DL (ref 59–158)
TOTAL IRON BINDING CAPACITY: 254 UG/DL (ref 250–450)
UNSATURATED IRON BINDING CAPACITY: 214 UG/DL (ref 112–347)
VITAMIN B-12: 512 PG/ML (ref 232–1245)

## 2023-03-02 RX ORDER — LANOLIN ALCOHOL/MO/W.PET/CERES
325 CREAM (GRAM) TOPICAL
Qty: 90 TABLET | Refills: 3 | Status: SHIPPED | OUTPATIENT
Start: 2023-03-02

## 2023-03-20 RX ORDER — PANTOPRAZOLE SODIUM 40 MG/1
40 TABLET, DELAYED RELEASE ORAL
Qty: 30 TABLET | Refills: 3 | Status: SHIPPED | OUTPATIENT
Start: 2023-03-20

## 2023-03-30 ENCOUNTER — OFFICE VISIT (OUTPATIENT)
Dept: ENDOCRINOLOGY | Age: 78
End: 2023-03-30

## 2023-03-30 VITALS
WEIGHT: 176 LBS | BODY MASS INDEX: 26.07 KG/M2 | HEART RATE: 64 BPM | HEIGHT: 69 IN | DIASTOLIC BLOOD PRESSURE: 73 MMHG | OXYGEN SATURATION: 97 % | SYSTOLIC BLOOD PRESSURE: 121 MMHG

## 2023-03-30 DIAGNOSIS — Z76.0 MEDICATION REFILL: ICD-10-CM

## 2023-03-30 DIAGNOSIS — E11.9 TYPE 2 DIABETES MELLITUS WITHOUT COMPLICATION, WITHOUT LONG-TERM CURRENT USE OF INSULIN (HCC): Primary | ICD-10-CM

## 2023-03-30 DIAGNOSIS — E11.69 TYPE 2 DIABETES MELLITUS WITH OTHER SPECIFIED COMPLICATION, WITHOUT LONG-TERM CURRENT USE OF INSULIN (HCC): ICD-10-CM

## 2023-03-30 LAB
CHP ED QC CHECK: NORMAL
GLUCOSE BLD-MCNC: 100 MG/DL

## 2023-03-30 RX ORDER — GLUCOSAMINE HCL/CHONDROITIN SU 500-400 MG
CAPSULE ORAL
Qty: 100 STRIP | Refills: 3 | Status: SHIPPED | OUTPATIENT
Start: 2023-03-30

## 2023-03-30 RX ORDER — DEXTROSE 4 G
TABLET,CHEWABLE ORAL
Qty: 100 EACH | Refills: 1 | Status: SHIPPED | OUTPATIENT
Start: 2023-03-30

## 2023-03-30 RX ORDER — LANCETS 30 GAUGE
EACH MISCELLANEOUS
Qty: 100 EACH | Refills: 3 | Status: SHIPPED | OUTPATIENT
Start: 2023-03-30

## 2023-03-30 RX ORDER — GLIMEPIRIDE 4 MG/1
TABLET ORAL
Qty: 180 TABLET | Refills: 1 | Status: SHIPPED | OUTPATIENT
Start: 2023-03-30

## 2023-03-30 NOTE — PROGRESS NOTES
atraumatic.      Right Ear: External ear normal.      Left Ear: External ear normal.      Nose: Nose normal.   Eyes:      General: No scleral icterus.        Right eye: No discharge.         Left eye: No discharge.      Extraocular Movements: Extraocular movements intact.      Conjunctiva/sclera: Conjunctivae normal.   Cardiovascular:      Rate and Rhythm: Normal rate.   Pulmonary:      Effort: Pulmonary effort is normal.   Musculoskeletal:         General: Normal range of motion.      Cervical back: Normal range of motion and neck supple.   Skin:     Findings: No lesion.   Neurological:      General: No focal deficit present.      Mental Status: He is alert and oriented to person, place, and time.   Psychiatric:         Mood and Affect: Mood normal.         Behavior: Behavior normal.

## 2023-04-04 ASSESSMENT — ENCOUNTER SYMPTOMS
EYES NEGATIVE: 1
VISUAL CHANGE: 0

## 2023-06-01 DIAGNOSIS — Z76.0 MEDICATION REFILL: ICD-10-CM

## 2023-06-01 RX ORDER — AVOBENZONE, HOMOSALATE, OCTISALATE, OCTOCRYLENE 30; 100; 50; 25 MG/ML; MG/ML; MG/ML; MG/ML
SPRAY TOPICAL
Qty: 90 TABLET | Refills: 4 | Status: SHIPPED | OUTPATIENT
Start: 2023-06-01

## 2023-06-26 RX ORDER — PANTOPRAZOLE SODIUM 40 MG/1
40 TABLET, DELAYED RELEASE ORAL
Qty: 60 TABLET | Refills: 1 | Status: SHIPPED | OUTPATIENT
Start: 2023-06-26

## 2023-07-18 DIAGNOSIS — E11.9 TYPE 2 DIABETES MELLITUS WITHOUT COMPLICATION, WITHOUT LONG-TERM CURRENT USE OF INSULIN (HCC): ICD-10-CM

## 2023-07-18 RX ORDER — BLOOD SUGAR DIAGNOSTIC
STRIP MISCELLANEOUS
Qty: 100 EACH | Refills: 3 | Status: SHIPPED | OUTPATIENT
Start: 2023-07-18

## 2023-07-18 RX ORDER — LANCETS 30 GAUGE
EACH MISCELLANEOUS
Qty: 100 EACH | Refills: 3 | Status: SHIPPED | OUTPATIENT
Start: 2023-07-18

## 2023-07-24 DIAGNOSIS — E11.69 TYPE 2 DIABETES MELLITUS WITH OTHER SPECIFIED COMPLICATION, WITHOUT LONG-TERM CURRENT USE OF INSULIN (HCC): ICD-10-CM

## 2023-07-24 RX ORDER — ISOPROPYL ALCOHOL 70 ML/100ML
SWAB TOPICAL
Qty: 100 EACH | Refills: 1 | Status: SHIPPED | OUTPATIENT
Start: 2023-07-24

## 2023-08-08 DIAGNOSIS — C61 PROSTATE CANCER (HCC): ICD-10-CM

## 2023-08-08 DIAGNOSIS — E11.9 TYPE 2 DIABETES MELLITUS WITHOUT COMPLICATION, WITHOUT LONG-TERM CURRENT USE OF INSULIN (HCC): ICD-10-CM

## 2023-08-08 LAB
ANION GAP SERPL CALCULATED.3IONS-SCNC: 12 MEQ/L (ref 9–15)
BUN SERPL-MCNC: 7 MG/DL (ref 8–23)
CALCIUM SERPL-MCNC: 9.4 MG/DL (ref 8.5–9.9)
CHLORIDE SERPL-SCNC: 103 MEQ/L (ref 95–107)
CO2 SERPL-SCNC: 25 MEQ/L (ref 20–31)
CREAT SERPL-MCNC: 1 MG/DL (ref 0.7–1.2)
GLUCOSE SERPL-MCNC: 130 MG/DL (ref 70–99)
HBA1C MFR BLD: 7.7 % (ref 4.8–5.9)
POTASSIUM SERPL-SCNC: 3.8 MEQ/L (ref 3.4–4.9)
PSA SERPL-MCNC: 0.17 NG/ML (ref 0–4)
SODIUM SERPL-SCNC: 140 MEQ/L (ref 135–144)

## 2023-08-13 DIAGNOSIS — Z76.0 MEDICATION REFILL: ICD-10-CM

## 2023-08-13 DIAGNOSIS — E78.5 HYPERLIPIDEMIA WITH TARGET LDL LESS THAN 100: ICD-10-CM

## 2023-08-14 RX ORDER — ATORVASTATIN CALCIUM 20 MG/1
TABLET, FILM COATED ORAL
Qty: 90 TABLET | Refills: 4 | OUTPATIENT
Start: 2023-08-14

## 2023-08-16 RX ORDER — ATORVASTATIN CALCIUM 40 MG/1
40 TABLET, FILM COATED ORAL DAILY
Qty: 90 TABLET | Refills: 4 | Status: SHIPPED | OUTPATIENT
Start: 2023-08-16

## 2023-08-28 ENCOUNTER — OFFICE VISIT (OUTPATIENT)
Dept: FAMILY MEDICINE CLINIC | Age: 78
End: 2023-08-28
Payer: COMMERCIAL

## 2023-08-28 ENCOUNTER — OFFICE VISIT (OUTPATIENT)
Dept: ENDOCRINOLOGY | Age: 78
End: 2023-08-28
Payer: COMMERCIAL

## 2023-08-28 VITALS
HEIGHT: 69 IN | SYSTOLIC BLOOD PRESSURE: 118 MMHG | DIASTOLIC BLOOD PRESSURE: 71 MMHG | HEART RATE: 60 BPM | WEIGHT: 178 LBS | BODY MASS INDEX: 26.36 KG/M2 | OXYGEN SATURATION: 98 %

## 2023-08-28 VITALS
TEMPERATURE: 97.5 F | SYSTOLIC BLOOD PRESSURE: 136 MMHG | HEIGHT: 69 IN | WEIGHT: 176 LBS | RESPIRATION RATE: 18 BRPM | OXYGEN SATURATION: 100 % | HEART RATE: 58 BPM | DIASTOLIC BLOOD PRESSURE: 74 MMHG | BODY MASS INDEX: 26.07 KG/M2

## 2023-08-28 DIAGNOSIS — E11.9 TYPE 2 DIABETES MELLITUS WITHOUT COMPLICATION, WITHOUT LONG-TERM CURRENT USE OF INSULIN (HCC): Primary | ICD-10-CM

## 2023-08-28 DIAGNOSIS — N18.31 STAGE 3A CHRONIC KIDNEY DISEASE (HCC): Chronic | ICD-10-CM

## 2023-08-28 DIAGNOSIS — E11.69 TYPE 2 DIABETES MELLITUS WITH OTHER SPECIFIED COMPLICATION, WITHOUT LONG-TERM CURRENT USE OF INSULIN (HCC): Primary | ICD-10-CM

## 2023-08-28 DIAGNOSIS — I10 ESSENTIAL HYPERTENSION, BENIGN: ICD-10-CM

## 2023-08-28 DIAGNOSIS — G89.29 CHRONIC PAIN OF BOTH KNEES: ICD-10-CM

## 2023-08-28 DIAGNOSIS — M25.562 CHRONIC PAIN OF BOTH KNEES: ICD-10-CM

## 2023-08-28 DIAGNOSIS — D51.9 ANEMIA DUE TO VITAMIN B12 DEFICIENCY, UNSPECIFIED B12 DEFICIENCY TYPE: ICD-10-CM

## 2023-08-28 DIAGNOSIS — Z76.0 MEDICATION REFILL: ICD-10-CM

## 2023-08-28 DIAGNOSIS — Z85.46 HISTORY OF PROSTATE CANCER: ICD-10-CM

## 2023-08-28 DIAGNOSIS — M25.561 CHRONIC PAIN OF BOTH KNEES: ICD-10-CM

## 2023-08-28 LAB
CHP ED QC CHECK: NORMAL
GLUCOSE BLD-MCNC: 113 MG/DL

## 2023-08-28 PROCEDURE — 3051F HG A1C>EQUAL 7.0%<8.0%: CPT | Performed by: PHYSICIAN ASSISTANT

## 2023-08-28 PROCEDURE — 3078F DIAST BP <80 MM HG: CPT | Performed by: PHYSICIAN ASSISTANT

## 2023-08-28 PROCEDURE — 82962 GLUCOSE BLOOD TEST: CPT | Performed by: INTERNAL MEDICINE

## 2023-08-28 PROCEDURE — 1123F ACP DISCUSS/DSCN MKR DOCD: CPT | Performed by: INTERNAL MEDICINE

## 2023-08-28 PROCEDURE — 3074F SYST BP LT 130 MM HG: CPT | Performed by: INTERNAL MEDICINE

## 2023-08-28 PROCEDURE — G8427 DOCREV CUR MEDS BY ELIG CLIN: HCPCS | Performed by: INTERNAL MEDICINE

## 2023-08-28 PROCEDURE — 99213 OFFICE O/P EST LOW 20 MIN: CPT | Performed by: INTERNAL MEDICINE

## 2023-08-28 PROCEDURE — G8427 DOCREV CUR MEDS BY ELIG CLIN: HCPCS | Performed by: PHYSICIAN ASSISTANT

## 2023-08-28 PROCEDURE — 3051F HG A1C>EQUAL 7.0%<8.0%: CPT | Performed by: INTERNAL MEDICINE

## 2023-08-28 PROCEDURE — G8417 CALC BMI ABV UP PARAM F/U: HCPCS | Performed by: INTERNAL MEDICINE

## 2023-08-28 PROCEDURE — 1036F TOBACCO NON-USER: CPT | Performed by: PHYSICIAN ASSISTANT

## 2023-08-28 PROCEDURE — 1123F ACP DISCUSS/DSCN MKR DOCD: CPT | Performed by: PHYSICIAN ASSISTANT

## 2023-08-28 PROCEDURE — 3074F SYST BP LT 130 MM HG: CPT | Performed by: PHYSICIAN ASSISTANT

## 2023-08-28 PROCEDURE — 3078F DIAST BP <80 MM HG: CPT | Performed by: INTERNAL MEDICINE

## 2023-08-28 PROCEDURE — 99214 OFFICE O/P EST MOD 30 MIN: CPT | Performed by: PHYSICIAN ASSISTANT

## 2023-08-28 PROCEDURE — G8417 CALC BMI ABV UP PARAM F/U: HCPCS | Performed by: PHYSICIAN ASSISTANT

## 2023-08-28 PROCEDURE — 1036F TOBACCO NON-USER: CPT | Performed by: INTERNAL MEDICINE

## 2023-08-28 RX ORDER — GLIMEPIRIDE 4 MG/1
TABLET ORAL
Qty: 180 TABLET | Refills: 1 | Status: SHIPPED | OUTPATIENT
Start: 2023-08-28

## 2023-08-28 ASSESSMENT — ENCOUNTER SYMPTOMS
COLOR CHANGE: 0
PHOTOPHOBIA: 0
SHORTNESS OF BREATH: 0
NAUSEA: 0
DIARRHEA: 0
VOMITING: 0
ABDOMINAL PAIN: 0
BLOOD IN STOOL: 0
CHEST TIGHTNESS: 0

## 2023-08-28 NOTE — PROGRESS NOTES
8/28/2023    Assessment:       Diagnosis Orders   1. Type 2 diabetes mellitus with other specified complication, without long-term current use of insulin ([facility])  POCT Glucose      2. Medication refill              PLAN:     Orders Placed This Encounter   Procedures    Hemoglobin A1C     Standing Status:   Future     Standing Expiration Date:   1/63/9307    Basic Metabolic Panel     Standing Status:   Future     Standing Expiration Date:   8/28/2024    POCT Glucose     Orders Placed This Encounter   Medications    glimepiride (AMARYL) 4 MG tablet     Sig: bid     Dispense:  180 tablet     Refill:  1    metFORMIN (GLUCOPHAGE) 1000 MG tablet     Sig: take 1 tablet by mouth twice a day with meals     Dispense:  180 tablet     Refill:  3    SITagliptin (JANUVIA) 100 MG tablet     Sig: take 1 tablet by mouth once daily     Dispense:  90 tablet     Refill:  3     Continue current medication regimen A1c goal of 8 or lower        Orders Placed This Encounter   Procedures    POCT Glucose     No orders of the defined types were placed in this encounter. No follow-ups on file. Subjective:     Chief Complaint   Patient presents with    Diabetes     Vitals:    08/28/23 1414   BP: 118/71   Pulse: 60   SpO2: 98%   Weight: 178 lb (80.7 kg)   Height: 5' 9\" (1.753 m)     Wt Readings from Last 3 Encounters:   08/28/23 178 lb (80.7 kg)   03/30/23 176 lb (79.8 kg)   02/28/23 176 lb (79.8 kg)     BP Readings from Last 3 Encounters:   08/28/23 118/71   03/30/23 121/73   02/28/23 138/66     Follow-up with IV diabetes patient is on metformin glimepiride Januvia hemoglobin A1c was done recently stable    Hemoglobin A1C       Date                     Value               Ref Range           Status                08/08/2023               7.7 (H)             4.8 - 5.9 %         Final            ----------      Diabetes  He presents for his follow-up diabetic visit. He has type 2 diabetes mellitus.  Pertinent negatives for diabetes include no

## 2023-08-28 NOTE — PROGRESS NOTES
Subjective  Elijahtaiwo Meyer., 69 y.o. male presents today with:  Chief Complaint   Patient presents with    Follow-up     Patient presents today for general follow up     Diabetes     Checking bs at home,     Medication Refill     Needs handicap placard       HPI  In the office today for routine follow up. Last OV with me: 2/28/23. Overall, feeling OK. Due for routine labs. History of prostate CA. Was followed by Dr. Marissa Person. Treated with hormonal and radiation therapy in 2015. Serial PSAs have been stable. Very little LUTS. HTN controlled with current medication regimen. Denies chest pain, tightness, SOB. No tobacco use. Occasional alcohol. Compliant with his diabetes medications. Denies side effects from medications. Stable control. Requesting handicap placard renewal.  Needs for chronic knee pain, difficulty ambulating. Review of Systems   Constitutional:  Negative for activity change, appetite change, chills, fatigue, fever and unexpected weight change. HENT:  Negative for congestion and nosebleeds. Eyes:  Negative for photophobia. Respiratory:  Negative for chest tightness and shortness of breath. Cardiovascular:  Negative for chest pain, palpitations and leg swelling. Gastrointestinal:  Negative for abdominal pain, blood in stool, diarrhea, nausea and vomiting. Genitourinary:  Negative for decreased urine volume, difficulty urinating, frequency and urgency. Musculoskeletal:  Positive for arthralgias and gait problem. Negative for myalgias. Skin:  Negative for color change and rash. Neurological:  Negative for dizziness and headaches. Hematological:  Does not bruise/bleed easily. Psychiatric/Behavioral:  Positive for dysphoric mood. Negative for agitation and sleep disturbance. Decreased concentration: grief. The patient is not nervous/anxious.       Past Medical History:   Diagnosis Date    Abnormal EKG 2/25/2021    Adenocarcinoma of prostate,

## 2023-09-05 ASSESSMENT — ENCOUNTER SYMPTOMS: VISUAL CHANGE: 0

## 2023-10-18 RX ORDER — PANTOPRAZOLE SODIUM 40 MG/1
40 TABLET, DELAYED RELEASE ORAL
Qty: 60 TABLET | Refills: 1 | Status: SHIPPED | OUTPATIENT
Start: 2023-10-18

## 2023-11-08 PROBLEM — R79.81 LOW OXYGEN SATURATION: Status: ACTIVE | Noted: 2023-11-08

## 2023-11-08 PROBLEM — E66.3 OVERWEIGHT: Status: ACTIVE | Noted: 2023-11-08

## 2023-11-08 PROBLEM — I49.1 PAC (PREMATURE ATRIAL CONTRACTION): Status: ACTIVE | Noted: 2023-11-08

## 2023-11-08 PROBLEM — R94.31 ABNORMAL EKG: Status: ACTIVE | Noted: 2023-11-08

## 2023-11-08 PROBLEM — R06.83 SNORING: Status: ACTIVE | Noted: 2023-11-08

## 2023-11-08 PROBLEM — E11.9 TYPE 2 DIABETES MELLITUS WITHOUT COMPLICATION (MULTI): Status: ACTIVE | Noted: 2023-11-08

## 2023-11-08 PROBLEM — R40.0 SOMNOLENCE, DAYTIME: Status: ACTIVE | Noted: 2023-11-08

## 2023-11-08 PROBLEM — I10 ESSENTIAL HYPERTENSION: Status: ACTIVE | Noted: 2023-11-08

## 2023-11-08 PROBLEM — E78.5 HYPERLIPIDEMIA: Status: ACTIVE | Noted: 2023-11-08

## 2023-11-08 RX ORDER — ACETAMINOPHEN, DEXTROMETHORPHAN HBR, DOXYLAMINE SUCCINATE, PHENYLEPHRINE HCL 650; 20; 12.5; 1 MG/30ML; MG/30ML; MG/30ML; MG/30ML
1 SOLUTION ORAL DAILY
COMMUNITY

## 2023-11-08 RX ORDER — METFORMIN HYDROCHLORIDE 1000 MG/1
1 TABLET ORAL EVERY 12 HOURS
COMMUNITY
Start: 2021-08-25

## 2023-11-08 RX ORDER — AMLODIPINE AND BENAZEPRIL HYDROCHLORIDE 5; 20 MG/1; MG/1
1 CAPSULE ORAL DAILY
COMMUNITY
Start: 2021-02-25

## 2023-11-08 RX ORDER — SILDENAFIL 100 MG/1
TABLET, FILM COATED ORAL
COMMUNITY

## 2023-11-08 RX ORDER — ACETAMINOPHEN 500 MG
TABLET ORAL
COMMUNITY

## 2023-11-08 RX ORDER — METOPROLOL SUCCINATE 25 MG/1
1 TABLET, EXTENDED RELEASE ORAL DAILY
COMMUNITY
Start: 2021-03-19

## 2023-11-08 RX ORDER — GLIMEPIRIDE 4 MG/1
1 TABLET ORAL 2 TIMES DAILY
COMMUNITY
Start: 2021-09-27

## 2023-11-08 RX ORDER — CHOLECALCIFEROL (VITAMIN D3) 25 MCG
1 TABLET ORAL DAILY
COMMUNITY
Start: 2021-02-25

## 2023-11-08 RX ORDER — ASPIRIN 81 MG/1
1 TABLET ORAL DAILY
COMMUNITY

## 2023-11-08 RX ORDER — AMMONIUM LACTATE 12 G/100G
LOTION TOPICAL 2 TIMES DAILY
COMMUNITY

## 2023-11-08 RX ORDER — CLONIDINE HYDROCHLORIDE 0.1 MG/1
1 TABLET ORAL 2 TIMES DAILY
COMMUNITY
Start: 2021-02-25

## 2023-11-08 RX ORDER — ATORVASTATIN CALCIUM 20 MG/1
1 TABLET, FILM COATED ORAL NIGHTLY
COMMUNITY
Start: 2021-02-25

## 2023-11-20 DIAGNOSIS — E11.9 TYPE 2 DIABETES MELLITUS WITHOUT COMPLICATION, WITHOUT LONG-TERM CURRENT USE OF INSULIN (HCC): ICD-10-CM

## 2023-11-20 RX ORDER — BLOOD SUGAR DIAGNOSTIC
STRIP MISCELLANEOUS
Qty: 100 EACH | Refills: 3 | Status: SHIPPED | OUTPATIENT
Start: 2023-11-20

## 2023-11-20 RX ORDER — LANCETS 30 GAUGE
EACH MISCELLANEOUS
Qty: 100 EACH | Refills: 3 | Status: SHIPPED | OUTPATIENT
Start: 2023-11-20

## 2023-11-27 ENCOUNTER — TELEPHONE (OUTPATIENT)
Dept: ENDOCRINOLOGY | Age: 78
End: 2023-11-27

## 2023-11-27 ENCOUNTER — OFFICE VISIT (OUTPATIENT)
Dept: FAMILY MEDICINE CLINIC | Age: 78
End: 2023-11-27
Payer: COMMERCIAL

## 2023-11-27 VITALS
DIASTOLIC BLOOD PRESSURE: 78 MMHG | BODY MASS INDEX: 26.51 KG/M2 | WEIGHT: 179 LBS | SYSTOLIC BLOOD PRESSURE: 120 MMHG | HEART RATE: 64 BPM | OXYGEN SATURATION: 97 % | HEIGHT: 69 IN | RESPIRATION RATE: 18 BRPM

## 2023-11-27 DIAGNOSIS — M25.562 CHRONIC PAIN OF BOTH KNEES: ICD-10-CM

## 2023-11-27 DIAGNOSIS — M25.561 CHRONIC PAIN OF BOTH KNEES: ICD-10-CM

## 2023-11-27 DIAGNOSIS — G89.29 CHRONIC PAIN OF BOTH KNEES: ICD-10-CM

## 2023-11-27 DIAGNOSIS — Z00.00 MEDICARE ANNUAL WELLNESS VISIT, SUBSEQUENT: Primary | ICD-10-CM

## 2023-11-27 PROCEDURE — 3074F SYST BP LT 130 MM HG: CPT | Performed by: PHYSICIAN ASSISTANT

## 2023-11-27 PROCEDURE — G8484 FLU IMMUNIZE NO ADMIN: HCPCS | Performed by: PHYSICIAN ASSISTANT

## 2023-11-27 PROCEDURE — 1123F ACP DISCUSS/DSCN MKR DOCD: CPT | Performed by: PHYSICIAN ASSISTANT

## 2023-11-27 PROCEDURE — G0439 PPPS, SUBSEQ VISIT: HCPCS | Performed by: PHYSICIAN ASSISTANT

## 2023-11-27 PROCEDURE — 3078F DIAST BP <80 MM HG: CPT | Performed by: PHYSICIAN ASSISTANT

## 2023-11-27 ASSESSMENT — LIFESTYLE VARIABLES
HOW MANY STANDARD DRINKS CONTAINING ALCOHOL DO YOU HAVE ON A TYPICAL DAY: PATIENT DOES NOT DRINK
HOW OFTEN DO YOU HAVE A DRINK CONTAINING ALCOHOL: NEVER

## 2023-11-27 ASSESSMENT — PATIENT HEALTH QUESTIONNAIRE - PHQ9
SUM OF ALL RESPONSES TO PHQ QUESTIONS 1-9: 0
SUM OF ALL RESPONSES TO PHQ9 QUESTIONS 1 & 2: 0
SUM OF ALL RESPONSES TO PHQ QUESTIONS 1-9: 0
SUM OF ALL RESPONSES TO PHQ QUESTIONS 1-9: 0
1. LITTLE INTEREST OR PLEASURE IN DOING THINGS: 0
2. FEELING DOWN, DEPRESSED OR HOPELESS: 0
SUM OF ALL RESPONSES TO PHQ QUESTIONS 1-9: 0

## 2023-11-27 NOTE — PATIENT INSTRUCTIONS
For more information on your local Area Agency on Aging or [place] on Aging please visit the appropriate web site below:    [facility]: MobileCycles.pl    [place]: https://aging. [place].gov/    [address] [address]: https://aging.[place].gov/    [place]: InsuranceSquad.es           [facility]  Dental care for older adults: Overview  Dental care for older people is much the same as for younger adults. But older adults do have concerns that younger adults do not. Older adults may have problems with gum disease and decay on the roots of their teeth. They may need missing teeth replaced or broken fillings fixed. Or they may have dentures that need to be cared for. Some older adults may have trouble holding a toothbrush. You can help remind the person you are caring for to brush and floss their teeth or to clean their dentures. In some cases, you may need to do the brushing and other dental care tasks. People who have trouble using their hands or who have dementia may need this extra help. How can you help with dental care? Normal dental care  To keep the teeth and gums healthy:  Brush the teeth with fluoride toothpaste twice a day--in the morning and at night--and floss at least once a day. Plaque can quickly build up on the teeth of older adults. Watch for the signs of gum disease. These signs include gums that bleed after brushing or after eating hard foods, such as apples. See a dentist regularly. Many experts recommend checkups every 6 months. Keep the dentist up to date on any new medications the person is taking. Encourage a balanced diet that includes whole grains, vegetables, and fruits, and that is low in saturated fat and sodium. Encourage the person you're caring for not to use tobacco products. They can affect dental and general health. Many older adults have a fixed income and feel that they can't afford dental care.  But most [facility] and

## 2023-11-27 NOTE — TELEPHONE ENCOUNTER
Left a message for patient received a PA for test strips onetouch ultra, PA was done online through [employer]. PA was not needed.

## 2024-02-05 DIAGNOSIS — E11.69 TYPE 2 DIABETES MELLITUS WITH OTHER SPECIFIED COMPLICATION, WITHOUT LONG-TERM CURRENT USE OF INSULIN (HCC): ICD-10-CM

## 2024-02-05 DIAGNOSIS — C61 PROSTATE CANCER (HCC): Primary | ICD-10-CM

## 2024-02-05 LAB
ANION GAP SERPL CALCULATED.3IONS-SCNC: 17 MEQ/L (ref 9–15)
BUN SERPL-MCNC: 9 MG/DL (ref 8–23)
CALCIUM SERPL-MCNC: 9.3 MG/DL (ref 8.5–9.9)
CHLORIDE SERPL-SCNC: 105 MEQ/L (ref 95–107)
CO2 SERPL-SCNC: 20 MEQ/L (ref 20–31)
CREAT SERPL-MCNC: 1.23 MG/DL (ref 0.7–1.2)
GLUCOSE SERPL-MCNC: 130 MG/DL (ref 70–99)
HBA1C MFR BLD: 7.6 % (ref 4.8–5.9)
POTASSIUM SERPL-SCNC: 3.7 MEQ/L (ref 3.4–4.9)
SODIUM SERPL-SCNC: 142 MEQ/L (ref 135–144)

## 2024-02-05 RX ORDER — LANCETS 33 GAUGE
EACH MISCELLANEOUS
Qty: 300 EACH | Refills: 1 | Status: SHIPPED | OUTPATIENT
Start: 2024-02-05

## 2024-02-05 RX ORDER — BLOOD SUGAR DIAGNOSTIC
STRIP MISCELLANEOUS
Qty: 300 EACH | Refills: 1 | Status: SHIPPED | OUTPATIENT
Start: 2024-02-05

## 2024-02-26 ENCOUNTER — OFFICE VISIT (OUTPATIENT)
Dept: ENDOCRINOLOGY | Age: 79
End: 2024-02-26
Payer: COMMERCIAL

## 2024-02-26 VITALS
SYSTOLIC BLOOD PRESSURE: 109 MMHG | DIASTOLIC BLOOD PRESSURE: 68 MMHG | HEART RATE: 67 BPM | WEIGHT: 174 LBS | HEIGHT: 69 IN | OXYGEN SATURATION: 97 % | BODY MASS INDEX: 25.77 KG/M2

## 2024-02-26 DIAGNOSIS — Z76.0 MEDICATION REFILL: ICD-10-CM

## 2024-02-26 DIAGNOSIS — E11.9 TYPE 2 DIABETES MELLITUS WITHOUT COMPLICATION, WITHOUT LONG-TERM CURRENT USE OF INSULIN (HCC): Primary | ICD-10-CM

## 2024-02-26 DIAGNOSIS — E11.69 TYPE 2 DIABETES MELLITUS WITH OTHER SPECIFIED COMPLICATION, WITHOUT LONG-TERM CURRENT USE OF INSULIN (HCC): ICD-10-CM

## 2024-02-26 LAB
CHP ED QC CHECK: NORMAL
GLUCOSE BLD-MCNC: 169 MG/DL

## 2024-02-26 PROCEDURE — G8484 FLU IMMUNIZE NO ADMIN: HCPCS | Performed by: INTERNAL MEDICINE

## 2024-02-26 PROCEDURE — G8417 CALC BMI ABV UP PARAM F/U: HCPCS | Performed by: INTERNAL MEDICINE

## 2024-02-26 PROCEDURE — 1036F TOBACCO NON-USER: CPT | Performed by: INTERNAL MEDICINE

## 2024-02-26 PROCEDURE — 3078F DIAST BP <80 MM HG: CPT | Performed by: INTERNAL MEDICINE

## 2024-02-26 PROCEDURE — 1123F ACP DISCUSS/DSCN MKR DOCD: CPT | Performed by: INTERNAL MEDICINE

## 2024-02-26 PROCEDURE — 99213 OFFICE O/P EST LOW 20 MIN: CPT | Performed by: INTERNAL MEDICINE

## 2024-02-26 PROCEDURE — 3074F SYST BP LT 130 MM HG: CPT | Performed by: INTERNAL MEDICINE

## 2024-02-26 PROCEDURE — 82962 GLUCOSE BLOOD TEST: CPT | Performed by: INTERNAL MEDICINE

## 2024-02-26 PROCEDURE — 3051F HG A1C>EQUAL 7.0%<8.0%: CPT | Performed by: INTERNAL MEDICINE

## 2024-02-26 PROCEDURE — G8427 DOCREV CUR MEDS BY ELIG CLIN: HCPCS | Performed by: INTERNAL MEDICINE

## 2024-02-26 RX ORDER — GLIMEPIRIDE 4 MG/1
TABLET ORAL
Qty: 180 TABLET | Refills: 1 | Status: SHIPPED | OUTPATIENT
Start: 2024-02-26 | End: 2024-02-29 | Stop reason: SDUPTHER

## 2024-02-26 RX ORDER — LANCETS 33 GAUGE
EACH MISCELLANEOUS
Qty: 300 EACH | Refills: 1 | Status: SHIPPED | OUTPATIENT
Start: 2024-02-26

## 2024-02-26 RX ORDER — BLOOD SUGAR DIAGNOSTIC
STRIP MISCELLANEOUS
Qty: 300 EACH | Refills: 1 | Status: SHIPPED | OUTPATIENT
Start: 2024-02-26

## 2024-02-26 NOTE — PROGRESS NOTES
2/26/2024    Assessment:       Diagnosis Orders   1. Type 2 diabetes mellitus without complication, without long-term current use of insulin ([facility])  POCT Glucose      2. Type 2 diabetes mellitus with other specified complication, without long-term current use of insulin ([facility])        3. Medication refill              PLAN:     Orders Placed This Encounter   Procedures    Basic Metabolic Panel     Standing Status:   Future     Standing Expiration Date:   2/26/2025    Hemoglobin A1C     Standing Status:   Future     Standing Expiration Date:   2/26/2025    POCT Glucose       Orders Placed This Encounter   Medications    blood glucose test strips (ONETOUCH ULTRA) strip     Sig: Test 3x daily Dx E11.65     Dispense:  300 each     Refill:  1    DISCONTD: glimepiride (AMARYL) 4 MG tablet     Sig: bid     Dispense:  180 tablet     Refill:  1    OneTouch Delica Lancets 33G MISC     Sig: Test 3x daily Dx E11.65     Dispense:  300 each     Refill:  1    DISCONTD: metFORMIN (GLUCOPHAGE) 1000 MG tablet     Sig: take 1 tablet by mouth twice a day with meals     Dispense:  180 tablet     Refill:  3    DISCONTD: SITagliptin (JANUVIA) 100 MG tablet     Sig: take 1 tablet by mouth once daily     Dispense:  90 tablet     Refill:  3           Orders Placed This Encounter   Procedures    POCT Glucose     No orders of the defined types were placed in this encounter.    No follow-ups on file.  Subjective:     Chief Complaint   Patient presents with    Diabetes     Vitals:    02/26/24 1433   BP: 109/68   Pulse: 67   SpO2: 97%   Weight: 78.9 kg (174 lb)   Height: 1.753 m (5' 9.02\")     Wt Readings from Last 3 Encounters:   02/26/24 78.9 kg (174 lb)   11/27/23 81.2 kg (179 lb)   08/28/23 79.8 kg (176 lb)     BP Readings from Last 3 Encounters:   02/26/24 109/68   11/27/23 120/78   08/28/23 136/74       Follow-up on type 2 diabetes patient on Januvia metformin glimepiride requesting refills history of chronic kidney disease hemoglobin A1c was

## 2024-02-29 ENCOUNTER — OFFICE VISIT (OUTPATIENT)
Dept: FAMILY MEDICINE CLINIC | Age: 79
End: 2024-02-29
Payer: COMMERCIAL

## 2024-02-29 VITALS
RESPIRATION RATE: 16 BRPM | WEIGHT: 175.04 LBS | HEART RATE: 70 BPM | HEIGHT: 69 IN | DIASTOLIC BLOOD PRESSURE: 70 MMHG | SYSTOLIC BLOOD PRESSURE: 122 MMHG | TEMPERATURE: 97.8 F | BODY MASS INDEX: 25.93 KG/M2 | OXYGEN SATURATION: 96 %

## 2024-02-29 DIAGNOSIS — D51.9 ANEMIA DUE TO VITAMIN B12 DEFICIENCY, UNSPECIFIED B12 DEFICIENCY TYPE: ICD-10-CM

## 2024-02-29 DIAGNOSIS — I10 ESSENTIAL HYPERTENSION, BENIGN: ICD-10-CM

## 2024-02-29 DIAGNOSIS — Z12.11 COLON CANCER SCREENING: ICD-10-CM

## 2024-02-29 DIAGNOSIS — E11.69 TYPE 2 DIABETES MELLITUS WITH OTHER SPECIFIED COMPLICATION, WITHOUT LONG-TERM CURRENT USE OF INSULIN (HCC): Primary | ICD-10-CM

## 2024-02-29 PROCEDURE — 3074F SYST BP LT 130 MM HG: CPT | Performed by: PHYSICIAN ASSISTANT

## 2024-02-29 PROCEDURE — G8484 FLU IMMUNIZE NO ADMIN: HCPCS | Performed by: PHYSICIAN ASSISTANT

## 2024-02-29 PROCEDURE — G8417 CALC BMI ABV UP PARAM F/U: HCPCS | Performed by: PHYSICIAN ASSISTANT

## 2024-02-29 PROCEDURE — 99214 OFFICE O/P EST MOD 30 MIN: CPT | Performed by: PHYSICIAN ASSISTANT

## 2024-02-29 PROCEDURE — 1036F TOBACCO NON-USER: CPT | Performed by: PHYSICIAN ASSISTANT

## 2024-02-29 PROCEDURE — 1123F ACP DISCUSS/DSCN MKR DOCD: CPT | Performed by: PHYSICIAN ASSISTANT

## 2024-02-29 PROCEDURE — 3051F HG A1C>EQUAL 7.0%<8.0%: CPT | Performed by: PHYSICIAN ASSISTANT

## 2024-02-29 PROCEDURE — 3078F DIAST BP <80 MM HG: CPT | Performed by: PHYSICIAN ASSISTANT

## 2024-02-29 PROCEDURE — G8427 DOCREV CUR MEDS BY ELIG CLIN: HCPCS | Performed by: PHYSICIAN ASSISTANT

## 2024-02-29 RX ORDER — GLIMEPIRIDE 4 MG/1
TABLET ORAL
Qty: 180 TABLET | Refills: 4 | Status: SHIPPED | OUTPATIENT
Start: 2024-02-29

## 2024-02-29 RX ORDER — ATORVASTATIN CALCIUM 40 MG/1
40 TABLET, FILM COATED ORAL DAILY
Qty: 90 TABLET | Refills: 4 | Status: SHIPPED | OUTPATIENT
Start: 2024-02-29

## 2024-02-29 RX ORDER — CLONIDINE HYDROCHLORIDE 0.1 MG/1
TABLET ORAL
Qty: 180 TABLET | Refills: 4 | Status: SHIPPED | OUTPATIENT
Start: 2024-02-29

## 2024-02-29 RX ORDER — PANTOPRAZOLE SODIUM 40 MG/1
40 TABLET, DELAYED RELEASE ORAL
Qty: 90 TABLET | Refills: 4 | Status: SHIPPED | OUTPATIENT
Start: 2024-02-29

## 2024-02-29 RX ORDER — METOPROLOL SUCCINATE 25 MG/1
25 TABLET, EXTENDED RELEASE ORAL DAILY
Qty: 90 TABLET | Refills: 4 | Status: SHIPPED | OUTPATIENT
Start: 2024-02-29

## 2024-02-29 RX ORDER — SILDENAFIL 100 MG/1
100 TABLET, FILM COATED ORAL DAILY PRN
Qty: 5 TABLET | Refills: 11 | Status: SHIPPED | OUTPATIENT
Start: 2024-02-29

## 2024-02-29 RX ORDER — AMLODIPINE BESYLATE AND BENAZEPRIL HYDROCHLORIDE 5; 20 MG/1; MG/1
CAPSULE ORAL
Qty: 90 CAPSULE | Refills: 4 | Status: SHIPPED | OUTPATIENT
Start: 2024-02-29

## 2024-02-29 RX ORDER — ASPIRIN 81 MG/1
TABLET ORAL
Qty: 90 TABLET | Refills: 4 | Status: SHIPPED | OUTPATIENT
Start: 2024-02-29

## 2024-02-29 SDOH — ECONOMIC STABILITY: INCOME INSECURITY: HOW HARD IS IT FOR YOU TO PAY FOR THE VERY BASICS LIKE FOOD, HOUSING, MEDICAL CARE, AND HEATING?: NOT HARD AT ALL

## 2024-02-29 SDOH — ECONOMIC STABILITY: FOOD INSECURITY: WITHIN THE PAST 12 MONTHS, YOU WORRIED THAT YOUR FOOD WOULD RUN OUT BEFORE YOU GOT MONEY TO BUY MORE.: NEVER TRUE

## 2024-02-29 SDOH — ECONOMIC STABILITY: FOOD INSECURITY: WITHIN THE PAST 12 MONTHS, THE FOOD YOU BOUGHT JUST DIDN'T LAST AND YOU DIDN'T HAVE MONEY TO GET MORE.: NEVER TRUE

## 2024-02-29 ASSESSMENT — ENCOUNTER SYMPTOMS
COLOR CHANGE: 0
VOMITING: 0
PHOTOPHOBIA: 0
DIARRHEA: 0
NAUSEA: 0
ABDOMINAL PAIN: 0
SHORTNESS OF BREATH: 0
BLOOD IN STOOL: 0
CHEST TIGHTNESS: 0

## 2024-02-29 ASSESSMENT — PATIENT HEALTH QUESTIONNAIRE - PHQ9
SUM OF ALL RESPONSES TO PHQ QUESTIONS 1-9: 0
SUM OF ALL RESPONSES TO PHQ QUESTIONS 1-9: 0
SUM OF ALL RESPONSES TO PHQ9 QUESTIONS 1 & 2: 0
SUM OF ALL RESPONSES TO PHQ QUESTIONS 1-9: 0
2. FEELING DOWN, DEPRESSED OR HOPELESS: 0
1. LITTLE INTEREST OR PLEASURE IN DOING THINGS: 0
SUM OF ALL RESPONSES TO PHQ QUESTIONS 1-9: 0

## 2024-02-29 NOTE — PROGRESS NOTES
What Type Of Note Output Would You Prefer (Optional)?: Standard Output
How Severe Is Your Acne?: mild
Is This A New Presentation, Or A Follow-Up?: Acne
deficiency, unspecified B12 deficiency type  CBC    Vitamin B12 & Folate      4. Colon cancer screening  Ambulatory referral to Gastroenterology      Labs for routine follow up.   Continue current medications.   6 month follow up with me.    Orders Placed This Encounter   Procedures    CBC     Standing Status:   Future     Standing Expiration Date:   2/28/2025    Lipid, Fasting     Standing Status:   Future     Standing Expiration Date:   2/28/2025    Vitamin B12 & Folate     Standing Status:   Future     Standing Expiration Date:   2/28/2025    Ambulatory referral to Gastroenterology     Referral Priority:   Routine     Referral Type:   Eval and Treat     Referral Reason:   Specialty Services Required     Requested Specialty:   Gastroenterology     Number of Visits Requested:   1     Orders Placed This Encounter   Medications    SITagliptin (JANUVIA) 100 MG tablet     Sig: take 1 tablet by mouth once daily     Dispense:  90 tablet     Refill:  4    sildenafil (VIAGRA) 100 MG tablet     Sig: Take 1 tablet by mouth daily as needed for Erectile Dysfunction     Dispense:  5 tablet     Refill:  11    pantoprazole (PROTONIX) 40 MG tablet     Sig: Take 1 tablet by mouth every morning (before breakfast)     Dispense:  90 tablet     Refill:  4    metoprolol succinate (TOPROL XL) 25 MG extended release tablet     Sig: Take 1 tablet by mouth daily     Dispense:  90 tablet     Refill:  4    metFORMIN (GLUCOPHAGE) 1000 MG tablet     Sig: take 1 tablet by mouth twice a day with meals     Dispense:  180 tablet     Refill:  4    glimepiride (AMARYL) 4 MG tablet     Sig: bid     Dispense:  180 tablet     Refill:  4    cloNIDine (CATAPRES) 0.1 MG tablet     Sig: take 1 tablet by mouth twice a day     Dispense:  180 tablet     Refill:  4    atorvastatin (LIPITOR) 40 MG tablet     Sig: Take 1 tablet by mouth daily     Dispense:  90 tablet     Refill:  4    aspirin (RA ASPIRIN EC) 81 MG EC tablet     Sig: take 1 tablet by mouth once

## 2024-03-14 NOTE — TELEPHONE ENCOUNTER
9/30/2020 8:29 AM   Candi Phelps   1995   [id number]     TELE PSYCHIATRY Disclaimer   *The patient was informed despite using HIPPA compliant technology there may be risks including security breach, technological failure, inability to perform a comprehensive physical exam which could delay or prevent an accurate diagnosis, and potential complications from treatment decisions rendered over a telemedical platform. The patient understands and consented to the use of tele-health service as being a safe measure to mitigate during COVID 19 Pandemic .  The patient was also informed of the relationship between the physician and patient and the respective role of any other health care provider with respect to management of the patient; and notified that the pt may decline to receive medical services by telemedicine and may withdraw from such care at any time.     Patient's Current location: [address] [place] [address]  In Case of Emergency pts next of kin  Name:Andreia (Mother)  Phone number:  [phone]  Visit type: Virtual visit with synchronous audio and video  Total time spent with patient: 15 min          OUTPATIENT PSYCHIATRY FOLLOW- UP VISIT    Reason for Encounter:  Candi Phelps, a 25 y.o. female,who presents today for follow up of ADHD and anxiety.  Met with patient.    Interval History and Content of Current Session:    Today,  Pt reports she does feel that the Wellbutrin is helping with her attention and anxiety substantially. States that the Wellbutrin is helping with energy during the day, and the Trazodone is helping her rest without feeling too groggy. Denies feeling antsy or restless. 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 States that she's taking the vyvanse 3 days a week for work. Does not take it on her off days the other 4 days. Denies SE from any medications. Denies changes in appetite. Denies SI/HI/AVH                 Social stressors:  Denies    Psychiatric Review  Refill Request    LOV  2/29/2024  Next  8/29/2024    # [phone]   Of Systems - Is patient experiencing or having changes in:    Symptoms of Depression:      Symptoms of ELLE:   Symptoms of psychosis:  disorganized behavior or abnormal motor behavior, or negative symptoms (diminshed emotional expression, avolition, anhedonia, alogia, asociality       Risk Parameters:  Patient reports no suicidal ideation  Patient reports no homicidal ideation  Patient reports no self-injurious behavior  Patient reports no violent behavior    Psychotropic medication review  Previous Trials-  Prozac (sexual SE) and Detrostat (didn't help with attention)    Current meds-  Vyvanse 60 mg daily   Wellbutrin  mg daily  Trazodone 50 mg PRN nightly for insomnia      Compliance: no    Side effects: None      Review of Systems     Past Medical, Family and Social History: The patient's past medical, family and social history have been reviewed and updated as appropriate within the electronic medical record - see encounter notes.    Medical Review of Symptoms  History obtained from the patient   General : NO chills or fever   Respiratory: NO cough, shortness of breath   Cardiovascular: NO chest pain, palpitations or racing heart   Gastrointestinal: NO nausea, vomiting, constipation or diarrhea   Neurological: NO confusion, dizziness, headaches or tremors   Psychiatric: please see HPI     Objective     ALL MEDICATIONS:    Current Outpatient Medications:     buPROPion (WELLBUTRIN XL) 150 MG TB24 tablet, Take 1 tablet (150 mg total) by mouth once daily., Disp: 30 tablet, Rfl: 1    fluticasone (FLONASE) 50 mcg/actuation nasal spray, 2 sprays (100 mcg total) by Each Nare route once daily., Disp: 1 Bottle, Rfl: 0    hyoscyamine (LEVSIN/SL) 0.125 mg Subl, hyoscyamine 0.125 mg sublingual tablet  Place 1 tablet 4 times a day by sublingual route as needed., Disp: , Rfl:     levocetirizine (XYZAL) 5 MG tablet, Take 1 tablet (5 mg total) by mouth once daily., Disp: 30 tablet, Rfl: 0     norelgestromin-ethinyl estradiol (ORTHO EVRA) 150-35 mcg/24 hr, Place 1 patch onto the skin every 7 days., Disp: 3 patch, Rfl: 11    silver sulfADIAZINE 1% (SILVADENE) 1 % cream, Apply to affected area daily, Disp: 85 g, Rfl: 0    traZODone (DESYREL) 50 MG tablet, Take 1 tablet (50 mg total) by mouth nightly as needed for Insomnia., Disp: 30 tablet, Rfl: 1    VYVANSE 70 mg capsule, TK ONE C PO  QAM, Disp: , Rfl: 0    ALLERGIES:  Review of patient's allergies indicates:  No Known Allergies    RELEVANT LABS/STUDIES:    Lab Results   Component Value Date    WBC 10.00 08/19/2016    HGB 13.1 08/19/2016    HCT 37.7 08/19/2016    MCV 92 08/19/2016     08/19/2016     BMP  Lab Results   Component Value Date     08/19/2016    K 4.2 08/19/2016     08/19/2016    CO2 19 (L) 08/19/2016    BUN 21 (H) 08/19/2016    CREATININE 0.9 08/19/2016    CALCIUM 9.7 08/19/2016    ANIONGAP 15 08/19/2016    ESTGFRAFRICA >60.0 08/19/2016    EGFRNONAA >60.0 08/19/2016     Lab Results   Component Value Date    ALT 16 08/19/2016    AST 25 08/19/2016    ALKPHOS 87 08/19/2016    BILITOT 0.5 08/19/2016     Lab Results   Component Value Date    TSH 1.240 08/19/2016     No results found for: LABA1C, HGBA1C    Constitutional  Vitals:  Most recent vital signs, dated greater than 90 days prior to this appointment, were reviewed.    There were no vitals filed for this visit.         PHYSICAL EXAM  General: well developed, well nourished  Neurologic:   Gait: Normal   Psychomotor signs:  No involuntary movements or tremor  AIMS: none    PSYCHIATRIC EXAM:     Mental Status Exam:  Appearance: unremarkable, age appropriate  Behavior/Cooperation: normal, cooperative  Speech: normal tone, normal rate, normal pitch, normal volume  Language: uses words appropriately; NO aphasia or dysarthria  Mood: great   Affect: normal mood-congruent, appropriate  Thought Process: normal and logical  Thought Content: normal, no suicidality, no homicidality,  delusions, or paranoia  Level of Consciousness: Alert and Oriented x3  Memory:  Intact  Attention/concentration: appropriate for age/education.   Fund of Knowledge: appears adequate  Insight: fair  Judgment: fair    Assessment and Diagnosis   Status/Progress: Based on the examination today, the patient's problem(s) is/are improved.  New problems have not been presented today.   Co-morbidities are not complicating management of the primary condition.  There are no active rule-out diagnoses for this patient at this time.     General Impression:       ICD-10-CM ICD-9-CM   1. ADHD (attention deficit hyperactivity disorder), inattentive type  F90.0 314.00   2. ELLE (generalized anxiety disorder)  F41.1 300.02       Intervention/Counseling/Treatment Plan   · Medication Management: -  ? Increase Wellbutrin XR to 300 mg daily, patient educated that if the wellbutrin makes her too restless, then can consider switching it to a different SR formulation at a lower dose.   ? Continue Vyvanse 60 mg daily. Patient educated to take Vyvanse on 2 of her off days to help with attention and focus.  ? Per check of the , patient las had medication filled 8/14/2020  ? Continue Trazodone 50 mg PRN nightly for insomnia      · Labs: reviewed most recent  · The treatment plan and follow up plan were reviewed with the patient.  · Discussed with patient informed consent, risks vs. benefits, alternative treatments, side effect profile and the inherent unpredictability of individual responses to these treatments. The patient expresses understanding of the above and displays the capacity to agree with this current plan and had no other questions.  · Encouraged Patient to keep future appointments.   · Take medications as prescribed and abstain from substance abuse.   · In the event of an emergency patient was advised to go to the emergency room.    Return to Clinic: 1 month    > than 50% of total time spend on coordination of care and counseling    (which included pts differential diagnosis and prognosis for psychiatric conditions, risks, benefits of treatments, instructions and adherence to treatment plan, risk reduction, reviewing current psychiatric medication regimen, medical problems and social stressors. In addtion to possible discussion with other healthcare provider/s)    Add on Psychotherapy time:0  Total Face time: 15 minutes      Clint Concepcion MD  [facility]   9/30/2020 8:29 AM

## 2024-03-15 RX ORDER — LANOLIN ALCOHOL/MO/W.PET/CERES
325 CREAM (GRAM) TOPICAL
Qty: 90 TABLET | Refills: 3 | Status: SHIPPED | OUTPATIENT
Start: 2024-03-15

## 2024-03-18 DIAGNOSIS — C61 PROSTATE CANCER (HCC): ICD-10-CM

## 2024-03-18 LAB — PSA SERPL-MCNC: 0.14 NG/ML (ref 0–4)

## 2024-03-20 ENCOUNTER — OFFICE VISIT (OUTPATIENT)
Dept: UROLOGY | Age: 79
End: 2024-03-20
Payer: COMMERCIAL

## 2024-03-20 VITALS
HEART RATE: 65 BPM | HEIGHT: 69 IN | BODY MASS INDEX: 25.92 KG/M2 | DIASTOLIC BLOOD PRESSURE: 82 MMHG | SYSTOLIC BLOOD PRESSURE: 130 MMHG | WEIGHT: 175 LBS

## 2024-03-20 DIAGNOSIS — C61 PROSTATE CANCER (HCC): Primary | ICD-10-CM

## 2024-03-20 DIAGNOSIS — Z12.5 SCREENING PSA (PROSTATE SPECIFIC ANTIGEN): ICD-10-CM

## 2024-03-20 PROCEDURE — 3075F SYST BP GE 130 - 139MM HG: CPT | Performed by: PHYSICIAN ASSISTANT

## 2024-03-20 PROCEDURE — G8427 DOCREV CUR MEDS BY ELIG CLIN: HCPCS | Performed by: PHYSICIAN ASSISTANT

## 2024-03-20 PROCEDURE — G8484 FLU IMMUNIZE NO ADMIN: HCPCS | Performed by: PHYSICIAN ASSISTANT

## 2024-03-20 PROCEDURE — 99203 OFFICE O/P NEW LOW 30 MIN: CPT | Performed by: PHYSICIAN ASSISTANT

## 2024-03-20 PROCEDURE — 3079F DIAST BP 80-89 MM HG: CPT | Performed by: PHYSICIAN ASSISTANT

## 2024-03-20 PROCEDURE — G8417 CALC BMI ABV UP PARAM F/U: HCPCS | Performed by: PHYSICIAN ASSISTANT

## 2024-03-20 PROCEDURE — 1036F TOBACCO NON-USER: CPT | Performed by: PHYSICIAN ASSISTANT

## 2024-03-20 PROCEDURE — 1123F ACP DISCUSS/DSCN MKR DOCD: CPT | Performed by: PHYSICIAN ASSISTANT

## 2024-03-20 ASSESSMENT — ENCOUNTER SYMPTOMS: APNEA: 0

## 2024-03-20 NOTE — PROGRESS NOTES
Subjective:      Patient ID: Alex Sousa Jr. is a 78 y.o. male    HPI  78 year old male who was diagnosed with prostate cancer in 2015 and underwent radiation therapy and hormonal therapy. He denies any urological complaints. His PSA on 3/18/24 was 0.14, previously it was 0.17 on 8/8/23, and prior to this it was 0.16 on 2/6/22. He denies gross hematuria and dysuria    Past Medical History:   Diagnosis Date    Abnormal EKG 2/25/2021    Adenocarcinoma of prostate, stage 2 (HCC) 2018    Jim 3+4, Dr Hoffman  PSA 7.69 intermediate risk    Erectile dysfunction associated with type 2 diabetes mellitus (HCC)     Essential hypertension, benign     Hyperlipidemia     Intellectual disability     Irregular heart rhythm 2/25/2021    Low HDL (under 40)     Obesity (BMI 30-39.9)     Obesity (BMI 30-39.9)     Pes planus of both feet 2015    Dr Robles    S/P colonoscopy with polypectomy 2014, 2019    Dr Robles    Stage 3a chronic kidney disease (HCC) 2/25/2021    Uncontrolled type 2 diabetes mellitus with microalbuminuria 2007    Dr Wu    Unspecified deformity of ankle and foot, acquired     Vitamin B12 deficiency anemia 2016     Past Surgical History:   Procedure Laterality Date    COLONOSCOPY  03/20/2014    Polyp, Diverticulosis, Internal Hemorrhoids-Dr Sanders    COLONOSCOPY N/A 4/11/2019    COLORECTAL CANCER SCREENING, NOT HIGH RISK performed by Sandro Sanders MD at [facility]    DENTAL SURGERY      Full upper/lower extractions    PROSTATE BIOPSY  1/2015    Dr Hoffman    UPPER GASTROINTESTINAL ENDOSCOPY N/A 4/21/2022    EGD DIAGNOSTIC ONLY performed by Sandro Sanders MD at [facility]     Social History     Socioeconomic History    Marital status: Single     Spouse name: None    Number of children: 2    Years of education: None    Highest education level: None   Occupational History    Occupation: Fast food, others, [employer]   Tobacco Use    Smoking status: Former     Current packs/day: 0.00     Average

## 2024-04-09 RX ORDER — POLYETHYLENE GLYCOL 3350, SODIUM CHLORIDE, SODIUM BICARBONATE, POTASSIUM CHLORIDE 420; 11.2; 5.72; 1.48 G/4L; G/4L; G/4L; G/4L
4000 POWDER, FOR SOLUTION ORAL ONCE
Qty: 4000 ML | Refills: 0 | Status: SHIPPED | OUTPATIENT
Start: 2024-04-09 | End: 2024-04-09

## 2024-04-10 ENCOUNTER — PREP FOR PROCEDURE (OUTPATIENT)
Dept: GASTROENTEROLOGY | Age: 79
End: 2024-04-10

## 2024-04-11 RX ORDER — SODIUM CHLORIDE 0.9 % (FLUSH) 0.9 %
5-40 SYRINGE (ML) INJECTION EVERY 12 HOURS SCHEDULED
Status: CANCELLED | OUTPATIENT
Start: 2024-04-11

## 2024-04-11 RX ORDER — SODIUM CHLORIDE 9 MG/ML
INJECTION, SOLUTION INTRAVENOUS PRN
Status: CANCELLED | OUTPATIENT
Start: 2024-04-11

## 2024-04-11 RX ORDER — SODIUM CHLORIDE 9 MG/ML
INJECTION, SOLUTION INTRAVENOUS CONTINUOUS
Status: CANCELLED | OUTPATIENT
Start: 2024-04-11

## 2024-04-11 RX ORDER — SODIUM CHLORIDE 0.9 % (FLUSH) 0.9 %
5-40 SYRINGE (ML) INJECTION PRN
Status: CANCELLED | OUTPATIENT
Start: 2024-04-11

## 2024-04-23 ENCOUNTER — ANESTHESIA (OUTPATIENT)
Dept: ENDOSCOPY | Age: 79
End: 2024-04-23
Payer: COMMERCIAL

## 2024-04-23 ENCOUNTER — HOSPITAL ENCOUNTER (OUTPATIENT)
Age: 79
Setting detail: OUTPATIENT SURGERY
Discharge: HOME | End: 2024-04-23
Attending: SPECIALIST | Admitting: SPECIALIST
Payer: COMMERCIAL

## 2024-04-23 ENCOUNTER — ANESTHESIA EVENT (OUTPATIENT)
Dept: ENDOSCOPY | Age: 79
End: 2024-04-23
Payer: COMMERCIAL

## 2024-04-23 VITALS
HEART RATE: 56 BPM | TEMPERATURE: 97.6 F | RESPIRATION RATE: 16 BRPM | DIASTOLIC BLOOD PRESSURE: 75 MMHG | BODY MASS INDEX: 27.4 KG/M2 | HEIGHT: 69 IN | SYSTOLIC BLOOD PRESSURE: 136 MMHG | OXYGEN SATURATION: 95 % | WEIGHT: 185 LBS

## 2024-04-23 PROBLEM — Z86.010 H/O ADENOMATOUS POLYP OF COLON: Status: ACTIVE | Noted: 2024-04-23

## 2024-04-23 PROBLEM — Z86.0101 H/O ADENOMATOUS POLYP OF COLON: Status: ACTIVE | Noted: 2024-04-23

## 2024-04-23 LAB
GLUCOSE BLD-MCNC: 102 MG/DL (ref 70–99)
PERFORMED ON: ABNORMAL

## 2024-04-23 PROCEDURE — 3700000000 HC ANESTHESIA ATTENDED CARE: Performed by: SPECIALIST

## 2024-04-23 PROCEDURE — 2580000003 HC RX 258: Performed by: SPECIALIST

## 2024-04-23 PROCEDURE — 3700000001 HC ADD 15 MINUTES (ANESTHESIA): Performed by: SPECIALIST

## 2024-04-23 PROCEDURE — 6360000002 HC RX W HCPCS: Performed by: NURSE ANESTHETIST, CERTIFIED REGISTERED

## 2024-04-23 PROCEDURE — 7100000011 HC PHASE II RECOVERY - ADDTL 15 MIN: Performed by: SPECIALIST

## 2024-04-23 PROCEDURE — G0105 COLORECTAL SCRN; HI RISK IND: HCPCS | Performed by: SPECIALIST

## 2024-04-23 PROCEDURE — 6370000000 HC RX 637 (ALT 250 FOR IP): Performed by: SPECIALIST

## 2024-04-23 PROCEDURE — 3609027000 HC COLONOSCOPY: Performed by: SPECIALIST

## 2024-04-23 PROCEDURE — 7100000010 HC PHASE II RECOVERY - FIRST 15 MIN: Performed by: SPECIALIST

## 2024-04-23 PROCEDURE — 2709999900 HC NON-CHARGEABLE SUPPLY: Performed by: SPECIALIST

## 2024-04-23 RX ORDER — SODIUM CHLORIDE 0.9 % (FLUSH) 0.9 %
5-40 SYRINGE (ML) INJECTION EVERY 12 HOURS SCHEDULED
Status: DISCONTINUED | OUTPATIENT
Start: 2024-04-23 | End: 2024-04-23 | Stop reason: HOSPADM

## 2024-04-23 RX ORDER — SODIUM CHLORIDE 9 MG/ML
INJECTION, SOLUTION INTRAVENOUS CONTINUOUS
Status: DISCONTINUED | OUTPATIENT
Start: 2024-04-23 | End: 2024-04-23 | Stop reason: HOSPADM

## 2024-04-23 RX ORDER — SODIUM CHLORIDE 9 MG/ML
INJECTION, SOLUTION INTRAVENOUS
Status: COMPLETED
Start: 2024-04-23 | End: 2024-04-23

## 2024-04-23 RX ORDER — SIMETHICONE 40MG/0.6ML
SUSPENSION, DROPS(FINAL DOSAGE FORM)(ML) ORAL PRN
Status: DISCONTINUED | OUTPATIENT
Start: 2024-04-23 | End: 2024-04-23 | Stop reason: ALTCHOICE

## 2024-04-23 RX ORDER — SODIUM CHLORIDE 9 MG/ML
INJECTION, SOLUTION INTRAVENOUS PRN
Status: DISCONTINUED | OUTPATIENT
Start: 2024-04-23 | End: 2024-04-23 | Stop reason: HOSPADM

## 2024-04-23 RX ORDER — SODIUM CHLORIDE 0.9 % (FLUSH) 0.9 %
5-40 SYRINGE (ML) INJECTION PRN
Status: DISCONTINUED | OUTPATIENT
Start: 2024-04-23 | End: 2024-04-23 | Stop reason: HOSPADM

## 2024-04-23 RX ORDER — MAGNESIUM HYDROXIDE 1200 MG/15ML
LIQUID ORAL PRN
Status: DISCONTINUED | OUTPATIENT
Start: 2024-04-23 | End: 2024-04-23 | Stop reason: ALTCHOICE

## 2024-04-23 RX ORDER — PROPOFOL 10 MG/ML
INJECTION, EMULSION INTRAVENOUS PRN
Status: DISCONTINUED | OUTPATIENT
Start: 2024-04-23 | End: 2024-04-23 | Stop reason: SDUPTHER

## 2024-04-23 RX ADMIN — PROPOFOL 100 MG: 10 INJECTION, EMULSION INTRAVENOUS at 08:59

## 2024-04-23 RX ADMIN — SODIUM CHLORIDE: 9 INJECTION, SOLUTION INTRAVENOUS at 08:45

## 2024-04-23 RX ADMIN — PROPOFOL 50 MG: 10 INJECTION, EMULSION INTRAVENOUS at 09:05

## 2024-04-23 RX ADMIN — PROPOFOL 100 MG: 10 INJECTION, EMULSION INTRAVENOUS at 09:02

## 2024-04-23 ASSESSMENT — PAIN - FUNCTIONAL ASSESSMENT: PAIN_FUNCTIONAL_ASSESSMENT: 0-10

## 2024-04-23 NOTE — H&P
Patient Name: Alex Sousa Jr.  : 1945  MRN: [id number]  DATE: 24      ENDOSCOPY  History and Physical    Procedure:    [x] Diagnostic Colonoscopy       [] Screening Colonoscopy  [] EGD      [] ERCP      [] EUS       [] Other    [x] Previous office notes/History and Physical reviewed from the patients chart. Please see EMR for further details of HPI. I have examined the patient's status immediately prior to the procedure and:      Indications/HPI:    []Abdominal Pain  []Cancer- GI/Lung  []Fhx of colon CA/polyps  []History of Polyps  []Anderson’s   []Melena  []Abnormal Imaging  []Dysphagia    []Persistent Pneumonia  []Anemia  []Food Impaction  [x]History of Polyps  []GI Bleed  []Pulmonary nodule/Mass  []Change in bowel habits []Heartburn/Reflux  []Rectal Bleed (BRBPR)  []Chest Pain - Non Cardiac []Heme (+) Stoo  l[]Ulcers  []Constipation  []Hemoptysis   []Varices  []Diarrhea  []Hypoxemia  []Nausea/Vomiting  []Screening   []Crohns/Colitis  []Other:     Anesthesia:   [x] MAC [] Moderate Sedation   [] General   [] None     ROS: 12 pt Review of Symptoms was negative unless mentioned above    Medications:   Prior to Admission medications    Medication Sig Start Date End Date Taking? Authorizing Provider   ferrous sulfate (FE TABS) 325 (65 Fe) MG EC tablet Take 1 tablet by mouth daily (with breakfast) 3/15/24   Sameera Wilson PA-C   SITagliptin (JANUVIA) 100 MG tablet take 1 tablet by mouth once daily 24   Sameera Wilson PA-C   sildenafil (VIAGRA) 100 MG tablet Take 1 tablet by mouth daily as needed for Erectile Dysfunction 24   Sameera Wilson PA-C   pantoprazole (PROTONIX) 40 MG tablet Take 1 tablet by mouth every morning (before breakfast) 24   Sameera Wilson PA-C   metoprolol succinate (TOPROL XL) 25 MG extended release tablet Take 1 tablet by mouth daily 24   Sameera Wilson PA-C   metFORMIN (GLUCOPHAGE) 1000 MG tablet take 1 tablet by mouth twice a day with meals 24   Sameera Wilson

## 2024-04-23 NOTE — ANESTHESIA POSTPROCEDURE EVALUATION
Department of Anesthesiology  Postprocedure Note    Patient: Alex Sousa Jr.  MRN: [id number]  YOB: 1945  Date of evaluation: 4/23/2024    Procedure Summary       Date: 04/23/24 Room / Location: [facility] OR 01 / [facility]    Anesthesia Start: 0854 Anesthesia Stop:     Procedure: COLORECTAL CANCER SCREENING, HIGH RISK Diagnosis:       History of colon polyps      (History of colon polyps [Z86.010])    Surgeons: Sandro Sanders MD Responsible Provider: Gregoria Yu APRN - CRNA    Anesthesia Type: MAC ASA Status: 3            Anesthesia Type: MAC    Mariam Phase I: Mariam Score: 10    Mariam Phase II:      Anesthesia Post Evaluation    Patient location during evaluation: bedside  Patient participation: complete - patient participated  Level of consciousness: awake and awake and alert  Airway patency: patent  Nausea & Vomiting: no nausea and no vomiting  Cardiovascular status: blood pressure returned to baseline and hemodynamically stable  Respiratory status: acceptable  Hydration status: euvolemic  Pain management: adequate        No notable events documented.

## 2024-04-23 NOTE — ANESTHESIA PRE PROCEDURE
Department of Anesthesiology  Preprocedure Note       Name:  Alex Sousa Jr.   Age:  78 y.o.  :  1945                                          MRN:  [id number]         Date:  2024      Surgeon: Surgeon(s):  Sandro Sanders MD    Procedure: Procedure(s):  COLORECTAL CANCER SCREENING, HIGH RISK    Medications prior to admission:   Prior to Admission medications    Medication Sig Start Date End Date Taking? Authorizing Provider   ferrous sulfate (FE TABS) 325 (65 Fe) MG EC tablet Take 1 tablet by mouth daily (with breakfast) 3/15/24   Sameera Wilson PA-C   SITagliptin (JANUVIA) 100 MG tablet take 1 tablet by mouth once daily 24   Sameera Wilson PA-C   sildenafil (VIAGRA) 100 MG tablet Take 1 tablet by mouth daily as needed for Erectile Dysfunction 24   Sameera Wilson PA-C   pantoprazole (PROTONIX) 40 MG tablet Take 1 tablet by mouth every morning (before breakfast) 24   Sameera Wilson PA-C   metoprolol succinate (TOPROL XL) 25 MG extended release tablet Take 1 tablet by mouth daily 24   Sameera Wilson PA-C   metFORMIN (GLUCOPHAGE) 1000 MG tablet take 1 tablet by mouth twice a day with meals 24   Sameera Wilson PA-C   glimepiride (AMARYL) 4 MG tablet bid 24   Sameera Wilson PA-C   cloNIDine (CATAPRES) 0.1 MG tablet take 1 tablet by mouth twice a day 24   Sameera Wilson PA-C   atorvastatin (LIPITOR) 40 MG tablet Take 1 tablet by mouth daily 24   Sameera Wilson PA-C   aspirin (RA ASPIRIN EC) 81 MG EC tablet take 1 tablet by mouth once daily 24   Sameera Wilson PA-C   amLODIPine-benazepril (LOTREL) 5-20 MG per capsule take 1 capsule by mouth once daily 24   Sameera Wilson PA-C   blood glucose test strips (ONETOUCH ULTRA) strip Test 3x daily Dx E11.65 24   Edil Wu MD   OneTouch Delica Lancets 33G MISC Test 3x daily Dx E11.65 24   Edil Wu MD   Handicap Placard MISC by Does not apply route Please give a placard and sticker for license plate.

## 2024-05-22 ENCOUNTER — TELEPHONE (OUTPATIENT)
Dept: CARDIOLOGY CLINIC | Age: 79
End: 2024-05-22

## 2024-05-22 NOTE — TELEPHONE ENCOUNTER
Please send another prescription for diabetic shoes to [facility]. on [address] please fax to [phone]

## 2024-08-13 DIAGNOSIS — I10 ESSENTIAL HYPERTENSION, BENIGN: ICD-10-CM

## 2024-08-13 DIAGNOSIS — E11.69 TYPE 2 DIABETES MELLITUS WITH OTHER SPECIFIED COMPLICATION, WITHOUT LONG-TERM CURRENT USE OF INSULIN (HCC): ICD-10-CM

## 2024-08-13 DIAGNOSIS — N18.31 STAGE 3A CHRONIC KIDNEY DISEASE (HCC): Chronic | ICD-10-CM

## 2024-08-13 DIAGNOSIS — D51.9 ANEMIA DUE TO VITAMIN B12 DEFICIENCY, UNSPECIFIED B12 DEFICIENCY TYPE: ICD-10-CM

## 2024-08-13 DIAGNOSIS — E11.9 TYPE 2 DIABETES MELLITUS WITHOUT COMPLICATION, WITHOUT LONG-TERM CURRENT USE OF INSULIN (HCC): ICD-10-CM

## 2024-08-13 LAB
ALBUMIN SERPL-MCNC: 4.2 G/DL (ref 3.5–4.6)
ALP SERPL-CCNC: 76 U/L (ref 35–104)
ALT SERPL-CCNC: 8 U/L (ref 0–41)
ANION GAP SERPL CALCULATED.3IONS-SCNC: 15 MEQ/L (ref 9–15)
AST SERPL-CCNC: 12 U/L (ref 0–40)
BILIRUB SERPL-MCNC: 0.6 MG/DL (ref 0.2–0.7)
BUN SERPL-MCNC: 11 MG/DL (ref 8–23)
CALCIUM SERPL-MCNC: 9.3 MG/DL (ref 8.5–9.9)
CHLORIDE SERPL-SCNC: 105 MEQ/L (ref 95–107)
CHOLEST SERPL-MCNC: 131 MG/DL (ref 0–199)
CO2 SERPL-SCNC: 23 MEQ/L (ref 20–31)
CREAT SERPL-MCNC: 1.26 MG/DL (ref 0.7–1.2)
ERYTHROCYTE [DISTWIDTH] IN BLOOD BY AUTOMATED COUNT: 16.5 % (ref 11.5–14.5)
GLOBULIN SER CALC-MCNC: 3 G/DL (ref 2.3–3.5)
GLUCOSE SERPL-MCNC: 111 MG/DL (ref 70–99)
HCT VFR BLD AUTO: 34.8 % (ref 42–52)
HDLC SERPL-MCNC: 31 MG/DL (ref 40–59)
HGB BLD-MCNC: 11 G/DL (ref 14–18)
LDL CHOLESTEROL: 76 MG/DL (ref 0–129)
MCH RBC QN AUTO: 25.8 PG (ref 27–31.3)
MCHC RBC AUTO-ENTMCNC: 31.6 % (ref 33–37)
MCV RBC AUTO: 81.5 FL (ref 79–92.2)
PLATELET # BLD AUTO: 259 K/UL (ref 130–400)
POTASSIUM SERPL-SCNC: 4.5 MEQ/L (ref 3.4–4.9)
PROT SERPL-MCNC: 7.2 G/DL (ref 6.3–8)
RBC # BLD AUTO: 4.27 M/UL (ref 4.7–6.1)
SODIUM SERPL-SCNC: 143 MEQ/L (ref 135–144)
TRIGLYCERIDE, FASTING: 121 MG/DL (ref 0–150)
WBC # BLD AUTO: 8.1 K/UL (ref 4.8–10.8)

## 2024-08-14 LAB
ESTIMATED AVERAGE GLUCOSE: 166 MG/DL
FOLATE: 6.6 NG/ML (ref 4.8–24.2)
HBA1C MFR BLD: 7.4 % (ref 4–6)
VITAMIN B-12: 744 PG/ML (ref 232–1245)

## 2024-08-22 DIAGNOSIS — Z76.0 MEDICATION REFILL: ICD-10-CM

## 2024-08-22 RX ORDER — AVOBENZONE, HOMOSALATE, OCTISALATE, OCTOCRYLENE 30; 100; 50; 25 MG/ML; MG/ML; MG/ML; MG/ML
SPRAY TOPICAL
Qty: 90 TABLET | Refills: 4 | Status: SHIPPED | OUTPATIENT
Start: 2024-08-22

## 2024-08-26 ENCOUNTER — OFFICE VISIT (OUTPATIENT)
Dept: ENDOCRINOLOGY | Age: 79
End: 2024-08-26
Payer: COMMERCIAL

## 2024-08-26 VITALS
HEIGHT: 69 IN | HEART RATE: 61 BPM | SYSTOLIC BLOOD PRESSURE: 119 MMHG | WEIGHT: 172 LBS | OXYGEN SATURATION: 97 % | BODY MASS INDEX: 25.48 KG/M2 | DIASTOLIC BLOOD PRESSURE: 70 MMHG

## 2024-08-26 DIAGNOSIS — E11.9 TYPE 2 DIABETES MELLITUS WITHOUT COMPLICATION, WITHOUT LONG-TERM CURRENT USE OF INSULIN (HCC): Primary | ICD-10-CM

## 2024-08-26 DIAGNOSIS — E11.69 TYPE 2 DIABETES MELLITUS WITH OTHER SPECIFIED COMPLICATION, WITHOUT LONG-TERM CURRENT USE OF INSULIN (HCC): ICD-10-CM

## 2024-08-26 LAB
CHP ED QC CHECK: NORMAL
GLUCOSE BLD-MCNC: 108 MG/DL

## 2024-08-26 PROCEDURE — G8417 CALC BMI ABV UP PARAM F/U: HCPCS | Performed by: INTERNAL MEDICINE

## 2024-08-26 PROCEDURE — 1036F TOBACCO NON-USER: CPT | Performed by: INTERNAL MEDICINE

## 2024-08-26 PROCEDURE — 99213 OFFICE O/P EST LOW 20 MIN: CPT | Performed by: INTERNAL MEDICINE

## 2024-08-26 PROCEDURE — 3074F SYST BP LT 130 MM HG: CPT | Performed by: INTERNAL MEDICINE

## 2024-08-26 PROCEDURE — 3078F DIAST BP <80 MM HG: CPT | Performed by: INTERNAL MEDICINE

## 2024-08-26 PROCEDURE — G8427 DOCREV CUR MEDS BY ELIG CLIN: HCPCS | Performed by: INTERNAL MEDICINE

## 2024-08-26 PROCEDURE — 3051F HG A1C>EQUAL 7.0%<8.0%: CPT | Performed by: INTERNAL MEDICINE

## 2024-08-26 PROCEDURE — 82962 GLUCOSE BLOOD TEST: CPT | Performed by: INTERNAL MEDICINE

## 2024-08-26 PROCEDURE — 1123F ACP DISCUSS/DSCN MKR DOCD: CPT | Performed by: INTERNAL MEDICINE

## 2024-08-26 RX ORDER — ISOPROPYL ALCOHOL 70 ML/100ML
SWAB TOPICAL
Qty: 100 EACH | Refills: 1 | Status: SHIPPED | OUTPATIENT
Start: 2024-08-26

## 2024-08-26 RX ORDER — GLIMEPIRIDE 4 MG/1
TABLET ORAL
Qty: 180 TABLET | Refills: 4 | Status: SHIPPED | OUTPATIENT
Start: 2024-08-26

## 2024-08-26 NOTE — PROGRESS NOTES
children: 2    Years of education: Not on file    Highest education level: Not on file   Occupational History    Occupation: [employer] food, others, SSI   Tobacco Use    Smoking status: Former     Current packs/day: 0.00     Average packs/day: 0.5 packs/day for 53.0 years (26.5 ttl pk-yrs)     Types: Cigarettes     Start date:      Quit date:      Years since quittin.6     Passive exposure: Past    Smokeless tobacco: Never   Vaping Use    Vaping status: Never Used   Substance and Sexual Activity    Alcohol use: Not Currently    Drug use: No    Sexual activity: Yes     Partners: Female   Other Topics Concern    Not on file   Social History Narrative    Born in [place], one of 11    Lives in a house in [place] with girlfriend    2 sons in [place], keeps in touch with one of them    Grands: many    Worked \"everywhere\", on disability         Hobbies: nivio games     Social Determinants of Health     Financial Resource Strain: Low Risk  (2024)    Overall Financial Resource Strain (CARDIA)     Difficulty of Paying Living Expenses: Not hard at all   Food Insecurity: No Food Insecurity (2024)    Hunger Vital Sign     Worried About Running Out of Food in the Last Year: Never true     Ran Out of Food in the Last Year: Never true   Transportation Needs: Unknown (2024)    PRAPARE - Transportation     Lack of Transportation (Medical): Not on file     Lack of Transportation (Non-Medical): No   Physical Activity: Sufficiently Active (2023)    Exercise Vital Sign     Days of Exercise per Week: 7 days     Minutes of Exercise per Session: 30 min   Stress: Not on file   Social Connections: Not on file   Intimate Partner Violence: Not on file   Housing Stability: Unknown (2024)    Housing Stability Vital Sign     Unable to Pay for Housing in the Last Year: Not on file     Number of Places Lived in the Last Year: Not on file     Unstable Housing in the Last Year: No     Family History   Problem Relation  Good for 5 years, 11/28/2028., Disp: 2 each, Rfl: 0    Lancets (ONETOUCH DELICA PLUS [id number]) MISC, Test BS 3x daily Dx E11.65, Disp: 100 each, Rfl: 3    RA Alcohol Swabs 70 % PADS, use as directed once daily, Disp: 100 each, Rfl: 1    blood glucose monitor strips, Pt test 3x daily, Disp: 100 strip, Rfl: 3    ammonium lactate (LAC-HYDRIN) 12 % lotion, Ammonium Lactate 12 % External Lotion APPLY AND RUB IN A THIN FILM TO AFFECTED AREAS TWICE DAILY.(AM AND PM). Quantity: 0 Refills: 0 Ordered: 22-Oct-2021 DO Active, Disp: , Rfl:     Cyanocobalamin ER (RA VITAMIN B-12 TR) 1000 MCG TBCR, take 1 tablet by mouth once daily, Disp: 90 tablet, Rfl: 4    Lancets MISC, 1 each by In Vitro route 3 times daily DX: E11.65, IDDM (please dispense covered brand), Disp: 100 each, Rfl: 6    Blood Glucose Monitoring Suppl (ONE TOUCH ULTRA MINI) w/Device KIT, 1 kit by Does not apply route 3 times daily DX: E11.65, IDDM, Disp: 1 kit, Rfl: 0    acetaminophen (TYLENOL) 500 MG tablet, Take 1 tablet by mouth 4 times daily as needed for Pain, Disp: 120 tablet, Rfl: 2  Lab Results   Component Value Date     08/13/2024    K 4.5 08/13/2024     08/13/2024    CO2 23 08/13/2024    BUN 11 08/13/2024    CREATININE 1.26 (H) 08/13/2024    GLUCOSE 111 (H) 08/13/2024    CALCIUM 9.3 08/13/2024    BILITOT 0.6 08/13/2024    ALKPHOS 76 08/13/2024    AST 12 08/13/2024    ALT 8 08/13/2024    LABGLOM 58.3 (L) 08/13/2024    GFRAA >60.0 03/11/2022    GLOB 3.0 08/13/2024     Lab Results   Component Value Date    WBC 8.1 08/13/2024    HGB 11.0 (L) 08/13/2024    HCT 34.8 (L) 08/13/2024    MCV 81.5 08/13/2024     08/13/2024     Lab Results   Component Value Date    LABA1C 7.4 (H) 08/13/2024    LABA1C 7.6 (H) 02/05/2024    LABA1C 7.7 (H) 08/08/2023     Lab Results   Component Value Date    CHOLFAST 131 08/13/2024    CHOLFAST 123 02/28/2023    CHOLFAST 143 03/11/2022    TRIGLYCFAST 121 08/13/2024    TRIGLYCFAST 99 02/28/2023    TRIGLYCFAST 92

## 2024-08-29 ENCOUNTER — OFFICE VISIT (OUTPATIENT)
Dept: FAMILY MEDICINE CLINIC | Age: 79
End: 2024-08-29
Payer: COMMERCIAL

## 2024-08-29 VITALS
HEART RATE: 77 BPM | DIASTOLIC BLOOD PRESSURE: 60 MMHG | BODY MASS INDEX: 26.37 KG/M2 | SYSTOLIC BLOOD PRESSURE: 98 MMHG | OXYGEN SATURATION: 99 % | TEMPERATURE: 97.7 F | WEIGHT: 174 LBS | HEIGHT: 68 IN

## 2024-08-29 DIAGNOSIS — M25.562 CHRONIC PAIN OF BOTH KNEES: ICD-10-CM

## 2024-08-29 DIAGNOSIS — E78.5 HYPERLIPIDEMIA WITH TARGET LDL LESS THAN 100: ICD-10-CM

## 2024-08-29 DIAGNOSIS — I10 ESSENTIAL HYPERTENSION, BENIGN: ICD-10-CM

## 2024-08-29 DIAGNOSIS — N18.31 STAGE 3A CHRONIC KIDNEY DISEASE (HCC): Chronic | ICD-10-CM

## 2024-08-29 DIAGNOSIS — G89.29 CHRONIC PAIN OF BOTH KNEES: ICD-10-CM

## 2024-08-29 DIAGNOSIS — Z79.4 TYPE 2 DIABETES MELLITUS WITHOUT COMPLICATION, WITH LONG-TERM CURRENT USE OF INSULIN (HCC): Primary | ICD-10-CM

## 2024-08-29 DIAGNOSIS — E11.9 TYPE 2 DIABETES MELLITUS WITHOUT COMPLICATION, WITH LONG-TERM CURRENT USE OF INSULIN (HCC): Primary | ICD-10-CM

## 2024-08-29 DIAGNOSIS — M25.561 CHRONIC PAIN OF BOTH KNEES: ICD-10-CM

## 2024-08-29 PROCEDURE — 99214 OFFICE O/P EST MOD 30 MIN: CPT | Performed by: PHYSICIAN ASSISTANT

## 2024-08-29 PROCEDURE — G8417 CALC BMI ABV UP PARAM F/U: HCPCS | Performed by: PHYSICIAN ASSISTANT

## 2024-08-29 PROCEDURE — 1036F TOBACCO NON-USER: CPT | Performed by: PHYSICIAN ASSISTANT

## 2024-08-29 PROCEDURE — G8427 DOCREV CUR MEDS BY ELIG CLIN: HCPCS | Performed by: PHYSICIAN ASSISTANT

## 2024-08-29 PROCEDURE — 3074F SYST BP LT 130 MM HG: CPT | Performed by: PHYSICIAN ASSISTANT

## 2024-08-29 PROCEDURE — 3051F HG A1C>EQUAL 7.0%<8.0%: CPT | Performed by: PHYSICIAN ASSISTANT

## 2024-08-29 PROCEDURE — 1123F ACP DISCUSS/DSCN MKR DOCD: CPT | Performed by: PHYSICIAN ASSISTANT

## 2024-08-29 PROCEDURE — 3078F DIAST BP <80 MM HG: CPT | Performed by: PHYSICIAN ASSISTANT

## 2024-08-29 RX ORDER — AVOBENZONE, HOMOSALATE, OCTISALATE, OCTOCRYLENE 30; 100; 50; 25 MG/ML; MG/ML; MG/ML; MG/ML
SPRAY TOPICAL
Qty: 90 TABLET | Refills: 4 | Status: SHIPPED | OUTPATIENT
Start: 2024-08-29

## 2024-08-29 RX ORDER — ASPIRIN 81 MG/1
TABLET ORAL
Qty: 90 TABLET | Refills: 4 | Status: SHIPPED | OUTPATIENT
Start: 2024-08-29

## 2024-08-29 RX ORDER — SILDENAFIL 100 MG/1
100 TABLET, FILM COATED ORAL DAILY PRN
Qty: 5 TABLET | Refills: 11 | Status: SHIPPED | OUTPATIENT
Start: 2024-08-29

## 2024-08-29 RX ORDER — METOPROLOL SUCCINATE 25 MG/1
25 TABLET, EXTENDED RELEASE ORAL DAILY
Qty: 90 TABLET | Refills: 4 | Status: SHIPPED | OUTPATIENT
Start: 2024-08-29

## 2024-08-29 RX ORDER — ATORVASTATIN CALCIUM 40 MG/1
40 TABLET, FILM COATED ORAL DAILY
Qty: 90 TABLET | Refills: 4 | Status: SHIPPED | OUTPATIENT
Start: 2024-08-29

## 2024-08-29 RX ORDER — PANTOPRAZOLE SODIUM 40 MG/1
40 TABLET, DELAYED RELEASE ORAL
Qty: 90 TABLET | Refills: 4 | Status: SHIPPED | OUTPATIENT
Start: 2024-08-29

## 2024-08-29 RX ORDER — AMLODIPINE AND BENAZEPRIL HYDROCHLORIDE 5; 20 MG/1; MG/1
CAPSULE ORAL
Qty: 90 CAPSULE | Refills: 4 | Status: SHIPPED | OUTPATIENT
Start: 2024-08-29

## 2024-08-29 RX ORDER — CLONIDINE HYDROCHLORIDE 0.1 MG/1
TABLET ORAL
Qty: 180 TABLET | Refills: 4 | Status: SHIPPED | OUTPATIENT
Start: 2024-08-29

## 2024-08-29 RX ORDER — FERROUS SULFATE 325(65) MG
325 TABLET, DELAYED RELEASE (ENTERIC COATED) ORAL
Qty: 90 TABLET | Refills: 4 | Status: SHIPPED | OUTPATIENT
Start: 2024-08-29

## 2024-08-29 ASSESSMENT — ENCOUNTER SYMPTOMS
VOMITING: 0
SHORTNESS OF BREATH: 0
BLOOD IN STOOL: 0
ABDOMINAL PAIN: 0
NAUSEA: 0
DIARRHEA: 0
PHOTOPHOBIA: 0
COLOR CHANGE: 0
CHEST TIGHTNESS: 0

## 2024-08-29 NOTE — PROGRESS NOTES
Subjective  Alex Sousa ., 79 y.o. male presents today with:  Chief Complaint   Patient presents with    Diabetes       HPI  Last OV with me: 2/29/24.     Overall, doing well.   Due for routine labs.      History of prostate CA.  Was followed by Dr. Hoffman.  Treated with hormonal and radiation therapy in 2015.  Serial PSAs have been stable.    Very little LUTS.   Established care with Osman Estrella in urology 3/20/24.      HTN controlled with current medication regimen.  Denies chest pain, tightness, SOB. No tobacco use.  Occasional alcohol.     Compliant with his diabetes medications.  Denies side effects from medications.  Stable control.     Followed by endocrinology.     Review of Systems   Constitutional:  Negative for activity change, appetite change, chills, fatigue, fever and unexpected weight change.   HENT:  Negative for congestion and nosebleeds.    Eyes:  Negative for photophobia.   Respiratory:  Negative for chest tightness and shortness of breath.    Cardiovascular:  Negative for chest pain, palpitations and leg swelling.   Gastrointestinal:  Negative for abdominal pain, blood in stool, diarrhea, nausea and vomiting.   Genitourinary:  Negative for decreased urine volume, difficulty urinating, frequency and urgency.   Musculoskeletal:  Positive for arthralgias and gait problem. Negative for myalgias.   Skin:  Negative for color change and rash.   Neurological:  Negative for dizziness and headaches.   Hematological:  Does not bruise/bleed easily.   Psychiatric/Behavioral:  Negative for agitation, dysphoric mood and sleep disturbance. The patient is not nervous/anxious.        Past Medical History:   Diagnosis Date    Abnormal EKG 2/25/2021    Adenocarcinoma of prostate, stage 2 (HCC) 2018    Jim 3+4, Dr Hoffman  PSA 7.69 intermediate risk    Erectile dysfunction associated with type 2 diabetes mellitus (HCC)     Essential hypertension, benign     Hyperlipidemia     Intellectual disability   one of them    Grands: many    Worked \"everywhere\", on disability         Hobbies: video games     Social Determinants of Health     Financial Resource Strain: Low Risk  (2/29/2024)    Overall Financial Resource Strain (CARDIA)     Difficulty of Paying Living Expenses: Not hard at all   Food Insecurity: No Food Insecurity (2/29/2024)    Hunger Vital Sign     Worried About Running Out of Food in the Last Year: Never true     Ran Out of Food in the Last Year: Never true   Transportation Needs: Unknown (2/29/2024)    PRAPARE - Transportation     Lack of Transportation (Medical): Not on file     Lack of Transportation (Non-Medical): No   Physical Activity: Sufficiently Active (11/27/2023)    Exercise Vital Sign     Days of Exercise per Week: 7 days     Minutes of Exercise per Session: 30 min   Stress: Not on file   Social Connections: Not on file   Intimate Partner Violence: Not on file   Housing Stability: Unknown (2/29/2024)    Housing Stability Vital Sign     Unable to Pay for Housing in the Last Year: Not on file     Number of Places Lived in the Last Year: Not on file     Unstable Housing in the Last Year: No     Family History   Problem Relation Age of Onset    Stroke Mother         dec age 80    Cancer Brother         liver, dec age 67    Diabetes Father     Cancer Father         prostate    Colon Cancer Neg Hx      No Known Allergies  Current Outpatient Medications   Medication Sig Dispense Refill    amLODIPine-benazepril (LOTREL) 5-20 MG per capsule take 1 capsule by mouth once daily 90 capsule 4    aspirin (RA ASPIRIN EC) 81 MG EC tablet take 1 tablet by mouth once daily 90 tablet 4    atorvastatin (LIPITOR) 40 MG tablet Take 1 tablet by mouth daily 90 tablet 4    cloNIDine (CATAPRES) 0.1 MG tablet take 1 tablet by mouth twice a day 180 tablet 4    ferrous sulfate (FE TABS) 325 (65 Fe) MG EC tablet Take 1 tablet by mouth daily (with breakfast) 90 tablet 4    metoprolol succinate (TOPROL XL) 25 MG extended

## 2025-01-29 DIAGNOSIS — E11.9 TYPE 2 DIABETES MELLITUS WITHOUT COMPLICATION, WITHOUT LONG-TERM CURRENT USE OF INSULIN (HCC): ICD-10-CM

## 2025-01-29 RX ORDER — LANCETS 33 GAUGE
EACH MISCELLANEOUS
Qty: 300 EACH | Refills: 2 | Status: SHIPPED | OUTPATIENT
Start: 2025-01-29

## 2025-01-29 RX ORDER — BLOOD SUGAR DIAGNOSTIC
STRIP MISCELLANEOUS
Qty: 300 STRIP | Refills: 2 | Status: SHIPPED | OUTPATIENT
Start: 2025-01-29

## 2025-02-14 DIAGNOSIS — Z12.5 SCREENING PSA (PROSTATE SPECIFIC ANTIGEN): ICD-10-CM

## 2025-02-14 DIAGNOSIS — C61 PROSTATE CANCER (HCC): ICD-10-CM

## 2025-02-14 DIAGNOSIS — E11.9 TYPE 2 DIABETES MELLITUS WITHOUT COMPLICATION, WITHOUT LONG-TERM CURRENT USE OF INSULIN (HCC): ICD-10-CM

## 2025-02-14 LAB
ANION GAP SERPL CALCULATED.3IONS-SCNC: 15 MEQ/L (ref 9–15)
BUN SERPL-MCNC: 11 MG/DL (ref 8–23)
CALCIUM SERPL-MCNC: 9.2 MG/DL (ref 8.5–9.9)
CHLORIDE SERPL-SCNC: 102 MEQ/L (ref 95–107)
CO2 SERPL-SCNC: 22 MEQ/L (ref 20–31)
CREAT SERPL-MCNC: 1.23 MG/DL (ref 0.7–1.2)
ESTIMATED AVERAGE GLUCOSE: 151 MG/DL
GLUCOSE SERPL-MCNC: 145 MG/DL (ref 70–99)
HBA1C MFR BLD: 6.9 % (ref 4–6)
POTASSIUM SERPL-SCNC: 3.7 MEQ/L (ref 3.4–4.9)
PSA SERPL-MCNC: 0.16 NG/ML (ref 0–4)
SODIUM SERPL-SCNC: 139 MEQ/L (ref 135–144)

## 2025-02-25 ENCOUNTER — OFFICE VISIT (OUTPATIENT)
Age: 80
End: 2025-02-25
Payer: COMMERCIAL

## 2025-02-25 VITALS
HEIGHT: 68 IN | WEIGHT: 170 LBS | HEART RATE: 64 BPM | DIASTOLIC BLOOD PRESSURE: 60 MMHG | SYSTOLIC BLOOD PRESSURE: 110 MMHG | OXYGEN SATURATION: 99 % | BODY MASS INDEX: 25.76 KG/M2

## 2025-02-25 DIAGNOSIS — Z79.4 TYPE 2 DIABETES MELLITUS WITHOUT COMPLICATION, WITH LONG-TERM CURRENT USE OF INSULIN (HCC): ICD-10-CM

## 2025-02-25 DIAGNOSIS — E11.9 TYPE 2 DIABETES MELLITUS WITHOUT COMPLICATION, WITH LONG-TERM CURRENT USE OF INSULIN (HCC): ICD-10-CM

## 2025-02-25 DIAGNOSIS — E78.5 HYPERLIPIDEMIA WITH TARGET LDL LESS THAN 100: ICD-10-CM

## 2025-02-25 DIAGNOSIS — Z00.00 MEDICARE ANNUAL WELLNESS VISIT, SUBSEQUENT: Primary | ICD-10-CM

## 2025-02-25 DIAGNOSIS — I10 ESSENTIAL HYPERTENSION, BENIGN: ICD-10-CM

## 2025-02-25 PROCEDURE — 3078F DIAST BP <80 MM HG: CPT | Performed by: PHYSICIAN ASSISTANT

## 2025-02-25 PROCEDURE — 1123F ACP DISCUSS/DSCN MKR DOCD: CPT | Performed by: PHYSICIAN ASSISTANT

## 2025-02-25 PROCEDURE — 1159F MED LIST DOCD IN RCRD: CPT | Performed by: PHYSICIAN ASSISTANT

## 2025-02-25 PROCEDURE — 1126F AMNT PAIN NOTED NONE PRSNT: CPT | Performed by: PHYSICIAN ASSISTANT

## 2025-02-25 PROCEDURE — 3074F SYST BP LT 130 MM HG: CPT | Performed by: PHYSICIAN ASSISTANT

## 2025-02-25 PROCEDURE — G0439 PPPS, SUBSEQ VISIT: HCPCS | Performed by: PHYSICIAN ASSISTANT

## 2025-02-25 PROCEDURE — 3044F HG A1C LEVEL LT 7.0%: CPT | Performed by: PHYSICIAN ASSISTANT

## 2025-02-25 RX ORDER — PANTOPRAZOLE SODIUM 40 MG/1
40 TABLET, DELAYED RELEASE ORAL
Qty: 90 TABLET | Refills: 4 | Status: SHIPPED | OUTPATIENT
Start: 2025-02-25

## 2025-02-25 RX ORDER — AMLODIPINE AND BENAZEPRIL HYDROCHLORIDE 5; 20 MG/1; MG/1
CAPSULE ORAL
Qty: 90 CAPSULE | Refills: 4 | Status: SHIPPED | OUTPATIENT
Start: 2025-02-25

## 2025-02-25 RX ORDER — CLONIDINE HYDROCHLORIDE 0.1 MG/1
TABLET ORAL
Qty: 180 TABLET | Refills: 4 | Status: SHIPPED | OUTPATIENT
Start: 2025-02-25

## 2025-02-25 RX ORDER — ATORVASTATIN CALCIUM 40 MG/1
40 TABLET, FILM COATED ORAL DAILY
Qty: 90 TABLET | Refills: 4 | Status: SHIPPED | OUTPATIENT
Start: 2025-02-25

## 2025-02-25 SDOH — ECONOMIC STABILITY: FOOD INSECURITY: WITHIN THE PAST 12 MONTHS, YOU WORRIED THAT YOUR FOOD WOULD RUN OUT BEFORE YOU GOT MONEY TO BUY MORE.: NEVER TRUE

## 2025-02-25 SDOH — ECONOMIC STABILITY: FOOD INSECURITY: WITHIN THE PAST 12 MONTHS, THE FOOD YOU BOUGHT JUST DIDN'T LAST AND YOU DIDN'T HAVE MONEY TO GET MORE.: NEVER TRUE

## 2025-02-25 ASSESSMENT — PATIENT HEALTH QUESTIONNAIRE - PHQ9
SUM OF ALL RESPONSES TO PHQ QUESTIONS 1-9: 0
SUM OF ALL RESPONSES TO PHQ9 QUESTIONS 1 & 2: 0
1. LITTLE INTEREST OR PLEASURE IN DOING THINGS: NOT AT ALL
SUM OF ALL RESPONSES TO PHQ QUESTIONS 1-9: 0
2. FEELING DOWN, DEPRESSED OR HOPELESS: NOT AT ALL
SUM OF ALL RESPONSES TO PHQ QUESTIONS 1-9: 0
2. FEELING DOWN, DEPRESSED OR HOPELESS: NOT AT ALL
SUM OF ALL RESPONSES TO PHQ QUESTIONS 1-9: 0
SUM OF ALL RESPONSES TO PHQ9 QUESTIONS 1 & 2: 0
1. LITTLE INTEREST OR PLEASURE IN DOING THINGS: NOT AT ALL
SUM OF ALL RESPONSES TO PHQ QUESTIONS 1-9: 0
SUM OF ALL RESPONSES TO PHQ QUESTIONS 1-9: 0

## 2025-02-25 ASSESSMENT — LIFESTYLE VARIABLES
HOW OFTEN DO YOU HAVE A DRINK CONTAINING ALCOHOL: NEVER
HOW MANY STANDARD DRINKS CONTAINING ALCOHOL DO YOU HAVE ON A TYPICAL DAY: PATIENT DOES NOT DRINK

## 2025-02-25 NOTE — PROGRESS NOTES
Medicare Annual Wellness Visit    Alex Sousa Jr. is here for Follow-up (Pt here for follow up /) and Medicare AWV    Assessment & Plan   Medicare annual wellness visit, subsequent  Essential hypertension, benign  Comments:  Stable and well-controlled on current meds.  Orders:  -     amLODIPine-benazepril (LOTREL) 5-20 MG per capsule; take 1 capsule by mouth once daily, Disp-90 capsule, R-4Normal  -     cloNIDine (CATAPRES) 0.1 MG tablet; take 1 tablet by mouth twice a day, Disp-180 tablet, R-4Normal  Type 2 diabetes mellitus without complication, with long-term current use of insulin (HCC)  -     atorvastatin (LIPITOR) 40 MG tablet; Take 1 tablet by mouth daily, Disp-90 tablet, R-4Normal  -     Lipid, Fasting; Future  Hyperlipidemia with target LDL less than 100  -     atorvastatin (LIPITOR) 40 MG tablet; Take 1 tablet by mouth daily, Disp-90 tablet, R-4Normal  -     Lipid, Fasting; Future     Routine labs, he is doing well.  Needs fasting lipid.  Reviewed recent labs, they are stable and look great.     Return in about 6 months (around 8/25/2025) for follow up in office.     Subjective   The following acute and/or chronic problems were also addressed today:  Last OV with me: 8/29/24.     Doing very well.  No falls.   Has eye exam scheduled.     Complaint with his diabetic therapy; has upcoming visit with Dr. Wu.      HTN-controlled; needing refills of his medications.  No dizziness, no sob.   No edema.     No falls, ambulates with a cane.     PSA_stable.     He had colonoscopy with Dr. Sanders, 4/23/24.  Recommended that he doesn't need further screenings at this time, +diverticula.     Patient's complete Health Risk Assessment and screening values have been reviewed and are found in Flowsheets. The following problems were reviewed today and where indicated follow up appointments were made and/or referrals ordered.    Positive Risk Factor Screenings with Interventions:    Fall Risk:  Do you feel unsteady or are

## 2025-02-26 ENCOUNTER — OFFICE VISIT (OUTPATIENT)
Dept: ENDOCRINOLOGY | Age: 80
End: 2025-02-26
Payer: COMMERCIAL

## 2025-02-26 VITALS
SYSTOLIC BLOOD PRESSURE: 117 MMHG | OXYGEN SATURATION: 98 % | BODY MASS INDEX: 25.47 KG/M2 | HEIGHT: 69 IN | DIASTOLIC BLOOD PRESSURE: 76 MMHG | WEIGHT: 171.96 LBS

## 2025-02-26 DIAGNOSIS — E11.9 TYPE 2 DIABETES MELLITUS WITHOUT COMPLICATION, WITHOUT LONG-TERM CURRENT USE OF INSULIN (HCC): Primary | ICD-10-CM

## 2025-02-26 DIAGNOSIS — E11.69 TYPE 2 DIABETES MELLITUS WITH OTHER SPECIFIED COMPLICATION, WITHOUT LONG-TERM CURRENT USE OF INSULIN (HCC): ICD-10-CM

## 2025-02-26 LAB
CHP ED QC CHECK: NORMAL
GLUCOSE BLD-MCNC: 111 MG/DL

## 2025-02-26 PROCEDURE — 1036F TOBACCO NON-USER: CPT | Performed by: INTERNAL MEDICINE

## 2025-02-26 PROCEDURE — 82962 GLUCOSE BLOOD TEST: CPT | Performed by: INTERNAL MEDICINE

## 2025-02-26 PROCEDURE — 3078F DIAST BP <80 MM HG: CPT | Performed by: INTERNAL MEDICINE

## 2025-02-26 PROCEDURE — 1126F AMNT PAIN NOTED NONE PRSNT: CPT | Performed by: INTERNAL MEDICINE

## 2025-02-26 PROCEDURE — G8427 DOCREV CUR MEDS BY ELIG CLIN: HCPCS | Performed by: INTERNAL MEDICINE

## 2025-02-26 PROCEDURE — G8417 CALC BMI ABV UP PARAM F/U: HCPCS | Performed by: INTERNAL MEDICINE

## 2025-02-26 PROCEDURE — 3074F SYST BP LT 130 MM HG: CPT | Performed by: INTERNAL MEDICINE

## 2025-02-26 PROCEDURE — 99213 OFFICE O/P EST LOW 20 MIN: CPT | Performed by: INTERNAL MEDICINE

## 2025-02-26 PROCEDURE — 3044F HG A1C LEVEL LT 7.0%: CPT | Performed by: INTERNAL MEDICINE

## 2025-02-26 PROCEDURE — 1123F ACP DISCUSS/DSCN MKR DOCD: CPT | Performed by: INTERNAL MEDICINE

## 2025-02-26 PROCEDURE — 1159F MED LIST DOCD IN RCRD: CPT | Performed by: INTERNAL MEDICINE

## 2025-02-26 RX ORDER — BLOOD SUGAR DIAGNOSTIC
STRIP MISCELLANEOUS
Qty: 300 STRIP | Refills: 2 | Status: SHIPPED | OUTPATIENT
Start: 2025-02-26

## 2025-02-26 RX ORDER — GLIMEPIRIDE 4 MG/1
TABLET ORAL
Qty: 180 TABLET | Refills: 4 | Status: SHIPPED | OUTPATIENT
Start: 2025-02-26

## 2025-02-26 RX ORDER — LANCETS 33 GAUGE
EACH MISCELLANEOUS
Qty: 300 EACH | Refills: 1 | Status: SHIPPED | OUTPATIENT
Start: 2025-02-26

## 2025-02-26 RX ORDER — ISOPROPYL ALCOHOL 70 ML/100ML
SWAB TOPICAL
Qty: 100 EACH | Refills: 1 | Status: SHIPPED | OUTPATIENT
Start: 2025-02-26

## 2025-02-26 NOTE — PROGRESS NOTES
2/26/2025    Assessment:       Diagnosis Orders   1. Type 2 diabetes mellitus without complication, without long-term current use of insulin (HCC)  POCT Glucose      2. Type 2 diabetes mellitus with other specified complication, without long-term current use of insulin ([facility])              PLAN:     Orders Placed This Encounter   Procedures    Basic Metabolic Panel     Standing Status:   Future     Standing Expiration Date:   2/26/2026    Hemoglobin A1C     Standing Status:   Future     Standing Expiration Date:   2/26/2026    Albumin/Creatinine Ratio, Urine     Standing Status:   Future     Standing Expiration Date:   2/26/2026    POCT Glucose     Orders Placed This Encounter   Medications    blood glucose test strips (ONETOUCH ULTRA) strip     Sig: TEST THREE TIMES DAILY     Dispense:  300 strip     Refill:  2    glimepiride (AMARYL) 4 MG tablet     Sig: bid     Dispense:  180 tablet     Refill:  4    metFORMIN (GLUCOPHAGE) 1000 MG tablet     Sig: take 1 tablet by mouth twice a day with meals     Dispense:  180 tablet     Refill:  4    SITagliptin (JANUVIA) 100 MG tablet     Sig: take 1 tablet by mouth once daily     Dispense:  90 tablet     Refill:  4    RA Alcohol Swabs 70 % PADS     Sig: use as directed once daily     Dispense:  100 each     Refill:  1    OneTouch Delica Lancets 33G MISC     Sig: Test 3x daily Dx E11.65     Dispense:  300 each     Refill:  1           Orders Placed This Encounter   Procedures    POCT Glucose     No orders of the defined types were placed in this encounter.    No follow-ups on file.  Subjective:     Chief Complaint   Patient presents with    Diabetes     Vitals:    02/26/25 1441   BP: 117/76   SpO2: 98%   Weight: 78 kg (171 lb 15.3 oz)   Height: 1.753 m (5' 9\")     Wt Readings from Last 3 Encounters:   02/26/25 78 kg (171 lb 15.3 oz)   02/25/25 77.1 kg (170 lb)   08/29/24 78.9 kg (174 lb)     BP Readings from Last 3 Encounters:   02/26/25 117/76   02/25/25 110/60   08/29/24 98/60

## 2025-04-21 ENCOUNTER — OFFICE VISIT (OUTPATIENT)
Age: 80
End: 2025-04-21
Payer: COMMERCIAL

## 2025-04-21 VITALS
HEIGHT: 69 IN | BODY MASS INDEX: 26.66 KG/M2 | SYSTOLIC BLOOD PRESSURE: 120 MMHG | HEART RATE: 66 BPM | DIASTOLIC BLOOD PRESSURE: 70 MMHG | WEIGHT: 180 LBS

## 2025-04-21 DIAGNOSIS — Z85.46 HISTORY OF PROSTATE CANCER: Primary | ICD-10-CM

## 2025-04-21 PROCEDURE — G8417 CALC BMI ABV UP PARAM F/U: HCPCS | Performed by: PHYSICIAN ASSISTANT

## 2025-04-21 PROCEDURE — 3074F SYST BP LT 130 MM HG: CPT | Performed by: PHYSICIAN ASSISTANT

## 2025-04-21 PROCEDURE — 3078F DIAST BP <80 MM HG: CPT | Performed by: PHYSICIAN ASSISTANT

## 2025-04-21 PROCEDURE — G8427 DOCREV CUR MEDS BY ELIG CLIN: HCPCS | Performed by: PHYSICIAN ASSISTANT

## 2025-04-21 PROCEDURE — 99213 OFFICE O/P EST LOW 20 MIN: CPT | Performed by: PHYSICIAN ASSISTANT

## 2025-04-21 PROCEDURE — 1123F ACP DISCUSS/DSCN MKR DOCD: CPT | Performed by: PHYSICIAN ASSISTANT

## 2025-04-21 PROCEDURE — 1036F TOBACCO NON-USER: CPT | Performed by: PHYSICIAN ASSISTANT

## 2025-04-21 PROCEDURE — 1159F MED LIST DOCD IN RCRD: CPT | Performed by: PHYSICIAN ASSISTANT

## 2025-04-21 ASSESSMENT — ENCOUNTER SYMPTOMS: APNEA: 0

## 2025-04-21 NOTE — PROGRESS NOTES
Subjective:      Patient ID: Alex Sousa Jr. is a 79 y.o. male    HPI78 year old male who was diagnosed with prostate cancer in 2015 and underwent radiation therapy and hormonal therapy. He denies any urological complaints. His PSA on 2/14/25 was 0.16, previously it was 0.14 on 3/18/24, and prior to this it was 0.17 on 8/8/23. He denies gross hematuria and dysuria. He denies any other urological complaints.     Past Medical History:   Diagnosis Date    Abnormal EKG 2/25/2021    Adenocarcinoma of prostate, stage 2 (HCC) 2018    Jim 3+4, Dr Hoffman  PSA 7.69 intermediate risk    Erectile dysfunction associated with type 2 diabetes mellitus     Essential hypertension, benign     Hyperlipidemia     Intellectual disability     Irregular heart rhythm 2/25/2021    Low HDL (under 40)     Obesity (BMI 30-39.9)     Obesity (BMI 30-39.9)     Pes planus of both feet 2015    Dr Robles    S/P colonoscopy with polypectomy 2014, 2019    Dr Robles    Stage 3a chronic kidney disease (HCC) 2/25/2021    Uncontrolled type 2 diabetes mellitus with microalbuminuria 2007    Dr Wu    Unspecified deformity of ankle and foot, acquired     Vitamin B12 deficiency anemia 2016     Past Surgical History:   Procedure Laterality Date    COLONOSCOPY  03/20/2014    Polyp, Diverticulosis, Internal Hemorrhoids-Dr Sanders    COLONOSCOPY N/A 4/11/2019    COLORECTAL CANCER SCREENING, NOT HIGH RISK performed by Sandro Sanders MD at [facility]    COLONOSCOPY N/A 4/23/2024    COLORECTAL CANCER SCREENING, HIGH RISK performed by Sandro Sanders MD at [facility]    DENTAL SURGERY      Full upper/lower extractions    PROSTATE BIOPSY  1/2015    Dr Hoffman    UPPER GASTROINTESTINAL ENDOSCOPY N/A 4/21/2022    EGD DIAGNOSTIC ONLY performed by Sandro Sanders MD at [facility]     Social History     Socioeconomic History    Marital status: Single     Spouse name: None    Number of children: 2    Years of education: None    Highest

## 2025-08-11 DIAGNOSIS — E78.5 HYPERLIPIDEMIA WITH TARGET LDL LESS THAN 100: ICD-10-CM

## 2025-08-11 DIAGNOSIS — E11.9 TYPE 2 DIABETES MELLITUS WITHOUT COMPLICATION, WITHOUT LONG-TERM CURRENT USE OF INSULIN (HCC): ICD-10-CM

## 2025-08-11 DIAGNOSIS — Z79.4 TYPE 2 DIABETES MELLITUS WITHOUT COMPLICATION, WITH LONG-TERM CURRENT USE OF INSULIN (HCC): ICD-10-CM

## 2025-08-11 DIAGNOSIS — E11.9 TYPE 2 DIABETES MELLITUS WITHOUT COMPLICATION, WITH LONG-TERM CURRENT USE OF INSULIN (HCC): ICD-10-CM

## 2025-08-11 LAB
ANION GAP SERPL CALCULATED.3IONS-SCNC: 15 MEQ/L (ref 9–15)
BUN SERPL-MCNC: 6 MG/DL (ref 8–23)
CALCIUM SERPL-MCNC: 8.6 MG/DL (ref 8.5–9.9)
CHLORIDE SERPL-SCNC: 107 MEQ/L (ref 95–107)
CHOLEST SERPL-MCNC: 110 MG/DL (ref 0–199)
CO2 SERPL-SCNC: 21 MEQ/L (ref 20–31)
CREAT SERPL-MCNC: 1.17 MG/DL (ref 0.7–1.2)
CREAT UR-MCNC: 171.6 MG/DL
GLUCOSE SERPL-MCNC: 174 MG/DL (ref 70–99)
HDLC SERPL-MCNC: 29 MG/DL (ref 40–59)
LDL CHOLESTEROL: 67 MG/DL (ref 0–129)
MICROALBUMIN UR-MCNC: 5.5 MG/DL
MICROALBUMIN/CREAT UR-RTO: 32.1 MG/G (ref 0–30)
POTASSIUM SERPL-SCNC: 3.9 MEQ/L (ref 3.4–4.9)
SODIUM SERPL-SCNC: 143 MEQ/L (ref 135–144)
TRIGLYCERIDE, FASTING: 71 MG/DL (ref 0–150)

## 2025-08-12 LAB
ESTIMATED AVERAGE GLUCOSE: 151 MG/DL
HBA1C MFR BLD: 6.9 % (ref 4–6)

## 2025-08-25 ENCOUNTER — OFFICE VISIT (OUTPATIENT)
Age: 80
End: 2025-08-25
Payer: COMMERCIAL

## 2025-08-25 VITALS
DIASTOLIC BLOOD PRESSURE: 64 MMHG | RESPIRATION RATE: 16 BRPM | BODY MASS INDEX: 26.66 KG/M2 | OXYGEN SATURATION: 98 % | SYSTOLIC BLOOD PRESSURE: 116 MMHG | TEMPERATURE: 97.3 F | WEIGHT: 180 LBS | HEIGHT: 69 IN

## 2025-08-25 DIAGNOSIS — E78.5 HYPERLIPIDEMIA WITH TARGET LDL LESS THAN 100: ICD-10-CM

## 2025-08-25 DIAGNOSIS — Z79.4 TYPE 2 DIABETES MELLITUS WITHOUT COMPLICATION, WITH LONG-TERM CURRENT USE OF INSULIN (HCC): Primary | ICD-10-CM

## 2025-08-25 DIAGNOSIS — I10 ESSENTIAL HYPERTENSION, BENIGN: ICD-10-CM

## 2025-08-25 DIAGNOSIS — N18.31 STAGE 3A CHRONIC KIDNEY DISEASE (HCC): Chronic | ICD-10-CM

## 2025-08-25 DIAGNOSIS — M25.562 CHRONIC PAIN OF BOTH KNEES: ICD-10-CM

## 2025-08-25 DIAGNOSIS — M25.561 CHRONIC PAIN OF BOTH KNEES: ICD-10-CM

## 2025-08-25 DIAGNOSIS — G89.29 CHRONIC PAIN OF BOTH KNEES: ICD-10-CM

## 2025-08-25 DIAGNOSIS — E11.9 TYPE 2 DIABETES MELLITUS WITHOUT COMPLICATION, WITH LONG-TERM CURRENT USE OF INSULIN (HCC): Primary | ICD-10-CM

## 2025-08-25 DIAGNOSIS — D51.9 ANEMIA DUE TO VITAMIN B12 DEFICIENCY, UNSPECIFIED B12 DEFICIENCY TYPE: ICD-10-CM

## 2025-08-25 DIAGNOSIS — E11.9 TYPE 2 DIABETES MELLITUS WITHOUT COMPLICATION, WITHOUT LONG-TERM CURRENT USE OF INSULIN (HCC): ICD-10-CM

## 2025-08-25 DIAGNOSIS — E11.69 TYPE 2 DIABETES MELLITUS WITH OTHER SPECIFIED COMPLICATION, WITHOUT LONG-TERM CURRENT USE OF INSULIN (HCC): ICD-10-CM

## 2025-08-25 PROCEDURE — G8427 DOCREV CUR MEDS BY ELIG CLIN: HCPCS | Performed by: PHYSICIAN ASSISTANT

## 2025-08-25 PROCEDURE — 3074F SYST BP LT 130 MM HG: CPT | Performed by: PHYSICIAN ASSISTANT

## 2025-08-25 PROCEDURE — 3044F HG A1C LEVEL LT 7.0%: CPT | Performed by: PHYSICIAN ASSISTANT

## 2025-08-25 PROCEDURE — G8417 CALC BMI ABV UP PARAM F/U: HCPCS | Performed by: PHYSICIAN ASSISTANT

## 2025-08-25 PROCEDURE — 99214 OFFICE O/P EST MOD 30 MIN: CPT | Performed by: PHYSICIAN ASSISTANT

## 2025-08-25 PROCEDURE — 3078F DIAST BP <80 MM HG: CPT | Performed by: PHYSICIAN ASSISTANT

## 2025-08-25 PROCEDURE — 1159F MED LIST DOCD IN RCRD: CPT | Performed by: PHYSICIAN ASSISTANT

## 2025-08-25 PROCEDURE — 1126F AMNT PAIN NOTED NONE PRSNT: CPT | Performed by: PHYSICIAN ASSISTANT

## 2025-08-25 PROCEDURE — 1123F ACP DISCUSS/DSCN MKR DOCD: CPT | Performed by: PHYSICIAN ASSISTANT

## 2025-08-25 PROCEDURE — 1036F TOBACCO NON-USER: CPT | Performed by: PHYSICIAN ASSISTANT

## 2025-08-25 RX ORDER — PSYLLIUM HUSK 3.4 G/7G
POWDER ORAL
Qty: 90 TABLET | Refills: 4 | Status: SHIPPED | OUTPATIENT
Start: 2025-08-25

## 2025-08-25 RX ORDER — GLIMEPIRIDE 4 MG/1
TABLET ORAL
Qty: 180 TABLET | Refills: 4 | Status: SHIPPED | OUTPATIENT
Start: 2025-08-25

## 2025-08-25 RX ORDER — ASPIRIN 81 MG/1
TABLET ORAL
Qty: 90 TABLET | Refills: 4 | Status: SHIPPED | OUTPATIENT
Start: 2025-08-25

## 2025-08-25 RX ORDER — FERROUS SULFATE 325(65) MG
325 TABLET, DELAYED RELEASE (ENTERIC COATED) ORAL
Qty: 90 TABLET | Refills: 4 | Status: SHIPPED | OUTPATIENT
Start: 2025-08-25

## 2025-08-25 RX ORDER — SILDENAFIL 100 MG/1
100 TABLET, FILM COATED ORAL DAILY PRN
Qty: 5 TABLET | Refills: 11 | Status: SHIPPED | OUTPATIENT
Start: 2025-08-25

## 2025-08-25 RX ORDER — AMLODIPINE AND BENAZEPRIL HYDROCHLORIDE 5; 20 MG/1; MG/1
CAPSULE ORAL
Qty: 90 CAPSULE | Refills: 4 | Status: SHIPPED | OUTPATIENT
Start: 2025-08-25

## 2025-08-25 RX ORDER — AVOBENZONE, HOMOSALATE, OCTISALATE, OCTOCRYLENE 30; 100; 50; 25 MG/ML; MG/ML; MG/ML; MG/ML
SPRAY TOPICAL
Qty: 90 TABLET | Refills: 4 | Status: SHIPPED | OUTPATIENT
Start: 2025-08-25

## 2025-08-25 RX ORDER — AVOBENZONE, HOMOSALATE, OCTISALATE, OCTOCRYLENE 30; 40; 45; 26 MG/ML; MG/ML; MG/ML; MG/ML
1 CREAM TOPICAL 3 TIMES DAILY
Qty: 100 EACH | Refills: 6 | Status: SHIPPED | OUTPATIENT
Start: 2025-08-25

## 2025-08-25 RX ORDER — ATORVASTATIN CALCIUM 40 MG/1
40 TABLET, FILM COATED ORAL DAILY
Qty: 90 TABLET | Refills: 4 | Status: SHIPPED | OUTPATIENT
Start: 2025-08-25

## 2025-08-25 RX ORDER — METOPROLOL SUCCINATE 25 MG/1
25 TABLET, EXTENDED RELEASE ORAL DAILY
Qty: 90 TABLET | Refills: 4 | Status: SHIPPED | OUTPATIENT
Start: 2025-08-25

## 2025-08-25 RX ORDER — PANTOPRAZOLE SODIUM 40 MG/1
40 TABLET, DELAYED RELEASE ORAL
Qty: 90 TABLET | Refills: 4 | Status: SHIPPED | OUTPATIENT
Start: 2025-08-25

## 2025-08-25 RX ORDER — CLONIDINE HYDROCHLORIDE 0.1 MG/1
TABLET ORAL
Qty: 180 TABLET | Refills: 4 | Status: SHIPPED | OUTPATIENT
Start: 2025-08-25

## 2025-08-25 ASSESSMENT — ENCOUNTER SYMPTOMS
PHOTOPHOBIA: 0
DIARRHEA: 0
CHEST TIGHTNESS: 0
SHORTNESS OF BREATH: 0
COLOR CHANGE: 0
ABDOMINAL PAIN: 0
VOMITING: 0
NAUSEA: 0
BLOOD IN STOOL: 0

## 2025-08-27 ENCOUNTER — OFFICE VISIT (OUTPATIENT)
Age: 80
End: 2025-08-27
Payer: COMMERCIAL

## 2025-08-27 VITALS
SYSTOLIC BLOOD PRESSURE: 98 MMHG | DIASTOLIC BLOOD PRESSURE: 62 MMHG | WEIGHT: 170 LBS | BODY MASS INDEX: 25.18 KG/M2 | OXYGEN SATURATION: 98 % | HEART RATE: 67 BPM | HEIGHT: 69 IN

## 2025-08-27 DIAGNOSIS — E11.9 TYPE 2 DIABETES MELLITUS WITHOUT COMPLICATION, WITHOUT LONG-TERM CURRENT USE OF INSULIN (HCC): Primary | ICD-10-CM

## 2025-08-27 LAB
CHP ED QC CHECK: NORMAL
GLUCOSE BLD-MCNC: 87 MG/DL

## 2025-08-27 PROCEDURE — 1036F TOBACCO NON-USER: CPT | Performed by: INTERNAL MEDICINE

## 2025-08-27 PROCEDURE — 82962 GLUCOSE BLOOD TEST: CPT | Performed by: INTERNAL MEDICINE

## 2025-08-27 PROCEDURE — 1126F AMNT PAIN NOTED NONE PRSNT: CPT | Performed by: INTERNAL MEDICINE

## 2025-08-27 PROCEDURE — 1123F ACP DISCUSS/DSCN MKR DOCD: CPT | Performed by: INTERNAL MEDICINE

## 2025-08-27 PROCEDURE — 3044F HG A1C LEVEL LT 7.0%: CPT | Performed by: INTERNAL MEDICINE

## 2025-08-27 PROCEDURE — 3078F DIAST BP <80 MM HG: CPT | Performed by: INTERNAL MEDICINE

## 2025-08-27 PROCEDURE — G8427 DOCREV CUR MEDS BY ELIG CLIN: HCPCS | Performed by: INTERNAL MEDICINE

## 2025-08-27 PROCEDURE — 99213 OFFICE O/P EST LOW 20 MIN: CPT | Performed by: INTERNAL MEDICINE

## 2025-08-27 PROCEDURE — 1159F MED LIST DOCD IN RCRD: CPT | Performed by: INTERNAL MEDICINE

## 2025-08-27 PROCEDURE — G8417 CALC BMI ABV UP PARAM F/U: HCPCS | Performed by: INTERNAL MEDICINE

## 2025-08-27 PROCEDURE — 3074F SYST BP LT 130 MM HG: CPT | Performed by: INTERNAL MEDICINE

## (undated) DEVICE — SINGLE PORT MANIFOLD: Brand: NEPTUNE 2

## (undated) DEVICE — FORCEPS BX L240CM JAW DIA2.4MM ORNG L CAP W/ NDL DISP RAD

## (undated) DEVICE — TUBING, SUCTION, 1/4" X 10', STRAIGHT: Brand: MEDLINE

## (undated) DEVICE — CONMED SCOPE SAVER BITE BLOCK, 20X27 MM: Brand: SCOPE SAVER

## (undated) DEVICE — Device: Brand: ENDO SMARTCAP

## (undated) DEVICE — TUBE SET 96 MM 64 MM H2O PERISTALTIC STD AUX CHANNEL

## (undated) DEVICE — BRUSH ENDO CLN L90.5IN SHTH DIA1.7MM BRIST DIA5-7MM 2-6MM

## (undated) DEVICE — ENDO CARRY-ON PROCEDURE KIT: Brand: ENDO CARRY-ON PROCEDURE KIT